# Patient Record
Sex: MALE | Race: WHITE | NOT HISPANIC OR LATINO | Employment: OTHER | URBAN - METROPOLITAN AREA
[De-identification: names, ages, dates, MRNs, and addresses within clinical notes are randomized per-mention and may not be internally consistent; named-entity substitution may affect disease eponyms.]

---

## 2017-01-09 DIAGNOSIS — F51.01 PRIMARY INSOMNIA: Primary | ICD-10-CM

## 2017-01-09 NOTE — TELEPHONE ENCOUNTER
zolpidem (AMBIEN) 10 MG tablet      Last Written Prescription Date:  12/7/16  Last Fill Quantity: 30,   # refills: 0  Last Office Visit with [facility], [facility] or [facility] prescribing provider: 8/2/16  Future Office visit:       Routing refill request to provider for review/approval because:  Drug not on the [facility], [facility] or [facility] refill protocol or controlled substance

## 2017-01-10 RX ORDER — ZOLPIDEM TARTRATE 10 MG/1
10 TABLET ORAL
Qty: 30 TABLET | Refills: 0 | Status: SHIPPED | OUTPATIENT
Start: 2017-01-10 | End: 2017-02-06

## 2017-01-16 ENCOUNTER — TELEPHONE (OUTPATIENT)
Dept: PEDIATRICS | Facility: CLINIC | Age: 72
End: 2017-01-16

## 2017-01-16 DIAGNOSIS — M54.50 CHRONIC BILATERAL LOW BACK PAIN WITHOUT SCIATICA: Primary | ICD-10-CM

## 2017-01-16 DIAGNOSIS — G89.29 CHRONIC BILATERAL LOW BACK PAIN WITHOUT SCIATICA: Primary | ICD-10-CM

## 2017-01-16 NOTE — TELEPHONE ENCOUNTER
Routing refill request to provider for review/approval because:  Drug not on the [facility] refill protocol   Cathy Rogel RN [facility] Triage.

## 2017-01-16 NOTE — TELEPHONE ENCOUNTER
Reason for Call:  Medication or medication refill:    Do you use a [facility]?  Name of the pharmacy and phone number for the current request:  [facility]     Name of the medication requested: hydrocordone    Other request: Will  at the .  Please call when the script is ready.    Can we leave a detailed message on this number? YES    Phone number patient can be reached at: Cell number on file:    Telephone Information:   Mobile [phone]       Best Time: anytime      Call taken on 1/16/2017 at 4:42 PM by Yusra Call

## 2017-01-17 RX ORDER — HYDROCODONE BITARTRATE AND ACETAMINOPHEN 5; 325 MG/1; MG/1
1 TABLET ORAL EVERY 6 HOURS PRN
Qty: 30 TABLET | Refills: 0 | Status: SHIPPED | OUTPATIENT
Start: 2017-01-17 | End: 2017-05-01

## 2017-01-17 NOTE — TELEPHONE ENCOUNTER
Prescription of HYDROcodone-acetaminophen (NORCO) 5-325 MG was brought to the .  Left detailed message, informing patient.

## 2017-01-17 NOTE — TELEPHONE ENCOUNTER
Patient picked up his prescription at the  on 1/17/17.    Yusra DUMONT  Patient Representative  [place]

## 2017-01-17 NOTE — TELEPHONE ENCOUNTER
Patient returning a missed call.  I advised patient a message was left for him that his Rx was brought to the  ready for .  DANIELLE Only.    Winnie PATEL  [place] Scheduling

## 2017-02-06 ENCOUNTER — TELEPHONE (OUTPATIENT)
Dept: PEDIATRICS | Facility: CLINIC | Age: 72
End: 2017-02-06

## 2017-02-06 ENCOUNTER — ANTICOAGULATION THERAPY VISIT (OUTPATIENT)
Dept: NURSING | Facility: CLINIC | Age: 72
End: 2017-02-06
Payer: COMMERCIAL

## 2017-02-06 DIAGNOSIS — D68.9 COAGULATION DEFECT (H): ICD-10-CM

## 2017-02-06 DIAGNOSIS — Z79.01 LONG-TERM (CURRENT) USE OF ANTICOAGULANTS: Primary | ICD-10-CM

## 2017-02-06 DIAGNOSIS — F51.01 PRIMARY INSOMNIA: Primary | ICD-10-CM

## 2017-02-06 LAB — INR POINT OF CARE: 2.4 (ref 0.86–1.14)

## 2017-02-06 PROCEDURE — 36416 COLLJ CAPILLARY BLOOD SPEC: CPT

## 2017-02-06 PROCEDURE — 85610 PROTHROMBIN TIME: CPT | Mod: QW

## 2017-02-06 PROCEDURE — 99207 ZZC NO CHARGE NURSE ONLY: CPT

## 2017-02-06 RX ORDER — ZOLPIDEM TARTRATE 10 MG/1
10 TABLET ORAL
Qty: 30 TABLET | Refills: 0 | Status: SHIPPED | OUTPATIENT
Start: 2017-02-06 | End: 2017-03-02

## 2017-02-06 NOTE — TELEPHONE ENCOUNTER
Zolpidem 10 mg      Last Written Prescription Date: 1/10/17  Last Fill Quantity: 30,  # refills: 0   Last Office Visit with [facility], [facility] or [facility] prescribing provider: 8/2/16  Routing refill request to provider for review/approval because:  Drug not on the [facility] refill protocol   Ana Lilia Yu RN  [facility]

## 2017-02-06 NOTE — TELEPHONE ENCOUNTER
Reason for Call:  Medication or medication refill: REFILL     Do you use a [facility]? NO  Name of the pharmacy and phone number for the current request:  [facility] location, [address] [place] [address]. [phone]  Name of the medication requested: zolpidem (AMBIEN) 10 MG tablet       Other request: Patient is leaving out of town on 02/09/2017, patient needs his refill by 02/08/2017.  Can you please call it into the [facility] pharmacy, and call patient when it is called in.    Can we leave a detailed message on this number? YES    Phone number patient can be reached at: Cell number on file:    Telephone Information:   Mobile [phone]       Best Time: anytime    Call taken on 2/6/2017 at 8:04 AM by Kristen Mark

## 2017-02-06 NOTE — MR AVS SNAPSHOT
Oh Solares   2/6/2017 11:40 AM   Anticoagulation Therapy Visit    Description:  71 year old male   Provider:  CP ANTI COAG   Department:  Cp Nurse           INR as of 2/6/2017     Selected INR 2.4 (2/6/2017)      Anticoagulation Summary as of 2/6/2017     INR goal 2.0-3.0   Selected INR 2.4 (2/6/2017)   Full instructions 2.5 mg on Mon; 5 mg all other days   Next INR check 3/21/2017    Indications   Long-term (current) use of anticoagulants [Z79.01] [Z79.01]  Coagulation defect (HCC) [D68.9] [D68.9]         Your next Anticoagulation Clinic appointment(s)     Feb 06, 2017 11:40 AM   Anticoagulation Visit with CP ANTI COAG   [facility]    [address] [facility] [id number] [phone]            Mar 21, 2017  8:20 AM   Anticoagulation Visit with CP ANTI COAG   [facility]    [address] [facility] [id number] [phone]              Contact Numbers     [facility]  Please call [phone] or [phone]  to cancel and/or reschedule your appointment.   Please call [phone] with any problems or questions regarding your therapy          February 2017 Details    Sun Mon Tue Wed Thu Fri Sat        1               2               3               4                 5               6      2.5 mg   See details      7      5 mg         8      5 mg         9      5 mg         10      5 mg         11      5 mg           12      5 mg         13      2.5 mg         14      5 mg         15      5 mg         16      5 mg         17      5 mg         18      5 mg           19      5 mg         20      2.5 mg         21      5 mg         22      5 mg         23      5 mg         24      5 mg         25      5 mg           26      5 mg         27      2.5 mg         28      5 mg              Date Details   02/06 This INR check                How to take your warfarin dose     To take:  2.5 mg Take 0.5 of a 5 mg tablet.    To take:  5 mg Take 1 of the 5 mg tablets.           March 2017 Details    Sun Mon Tue Wed Thu Fri Sat        1      5 mg         2      5 mg         3      5 mg         4      5 mg           5      5 mg         6      2.5 mg         7      5 mg         8      5 mg         9      5 mg         10      5 mg         11      5 mg           12      5 mg         13      2.5 mg         14      5 mg         15      5 mg         16      5 mg         17      5 mg         18      5 mg           19      5 mg         20      2.5 mg         21            22               23               24               25                 26               27               28               29               30               31                 Date Details   No additional details    Date of next INR:  3/21/2017         How to take your warfarin dose     To take:  2.5 mg Take 0.5 of a 5 mg tablet.    To take:  5 mg Take 1 of the 5 mg tablets.

## 2017-02-06 NOTE — PROGRESS NOTES
ANTICOAGULATION FOLLOW-UP CLINIC VISIT    Patient Name:  Oh Solares  Date:  2/6/2017  Contact Type:  Face to Face    SUBJECTIVE: patient here to get INR checked before going to [place] for a month.        OBJECTIVE    INR PROTIME   Date Value Ref Range Status   02/06/2017 2.4* 0.86 - 1.14 Final       ASSESSMENT / PLAN  INR assessment THER    Recheck INR In: 6 WEEKS    INR Location Clinic      Anticoagulation Summary as of 2/6/2017     INR goal 2.0-3.0   Selected INR 2.4 (2/6/2017)   Maintenance plan 2.5 mg (5 mg x 0.5) on Mon; 5 mg (5 mg x 1) all other days   Full instructions 2.5 mg on Mon; 5 mg all other days   Weekly total 32.5 mg   No change documented Zulema Genao RN   Plan last modified Zulema Genao RN (10/3/2016)   Next INR check 3/21/2017   Target end date     Indications   Long-term (current) use of anticoagulants [Z79.01] [Z79.01]  Coagulation defect (HCC) [D68.9] [D68.9]         Anticoagulation Episode Summary     INR check location     Preferred lab     Send INR reminders to [facility]    Comments       Anticoagulation Care Providers     Provider Role Specialty Phone number    Brendon Kay MD  Internal Medicine [phone]            See the Encounter Report to view Anticoagulation Flowsheet and Dosing Calendar (Go to Encounters tab in chart review, and find the Anticoagulation Therapy Visit)    Dosage adjustment made based on physician directed care plan.    Zulema Genao RN

## 2017-02-06 NOTE — TELEPHONE ENCOUNTER
Prescription of zolpidem (AMBIEN) 10 MG tablet was faxed to pharmacy.  Spoke with patient and informed.

## 2017-03-02 ENCOUNTER — TELEPHONE (OUTPATIENT)
Dept: PEDIATRICS | Facility: CLINIC | Age: 72
End: 2017-03-02

## 2017-03-02 DIAGNOSIS — F51.01 PRIMARY INSOMNIA: ICD-10-CM

## 2017-03-02 RX ORDER — ZOLPIDEM TARTRATE 10 MG/1
10 TABLET ORAL
Qty: 30 TABLET | Refills: 0 | Status: CANCELLED | OUTPATIENT
Start: 2017-03-02

## 2017-03-02 RX ORDER — ZOLPIDEM TARTRATE 10 MG/1
10 TABLET ORAL
Qty: 30 TABLET | Refills: 0 | Status: SHIPPED | OUTPATIENT
Start: 2017-03-02 | End: 2017-04-03

## 2017-03-02 NOTE — TELEPHONE ENCOUNTER
Reason for Call:  Medication or medication refill:    Do you use a [facility]?  Name of the pharmacy and phone number for the current request:  [facility] [address] [place]  [address] [phone]    Name of the medication requested: zolpidem (AMBIEN) 10 MG tablet    Other request: Patient is in [place] please to [facility] in [place]    Can we leave a detailed message on this number? YES    Phone number patient can be reached at: Cell number on file:    Telephone Information:   Mobile [phone]       Best Time: Anytime    Call taken on 3/2/2017 at 1:57 PM by Edilma Diego

## 2017-03-02 NOTE — TELEPHONE ENCOUNTER
zolpidem (AMBIEN) 10 MG tablet      Last Written Prescription Date:  2/6/17  Last Fill Quantity: 30,   # refills: 0  Last Office Visit with [facility], [facility] or [facility] prescribing provider: 8/2/16  Future Office visit:       Routing refill request to provider for review/approval because:  Drug not on the [facility], [facility] or [facility] refill protocol or controlled substance

## 2017-04-03 ENCOUNTER — ANTICOAGULATION THERAPY VISIT (OUTPATIENT)
Dept: NURSING | Facility: CLINIC | Age: 72
End: 2017-04-03
Payer: COMMERCIAL

## 2017-04-03 DIAGNOSIS — Z79.01 LONG-TERM (CURRENT) USE OF ANTICOAGULANTS: ICD-10-CM

## 2017-04-03 DIAGNOSIS — F51.01 PRIMARY INSOMNIA: ICD-10-CM

## 2017-04-03 DIAGNOSIS — D68.9 COAGULATION DEFECT (H): ICD-10-CM

## 2017-04-03 LAB — INR POINT OF CARE: 2.6 (ref 0.86–1.14)

## 2017-04-03 PROCEDURE — 85610 PROTHROMBIN TIME: CPT | Mod: QW

## 2017-04-03 PROCEDURE — 36416 COLLJ CAPILLARY BLOOD SPEC: CPT

## 2017-04-03 PROCEDURE — 99207 ZZC NO CHARGE NURSE ONLY: CPT

## 2017-04-03 NOTE — TELEPHONE ENCOUNTER
Reason for Call:  Medication or medication refill:    Do you use a [facility]?  Name of the pharmacy and phone number for the current request:  [facility], [address], [place] [phone]    Name of the medication requested: zolpidem (AMBIEN) 10 MG tablet    Other request: Patient is out of medication    Can we leave a detailed message on this number? YES    Phone number patient can be reached at: [phone]    Best Time: anytime    Call taken on 4/3/2017 at 1:15 PM by Marcus Carpenter

## 2017-04-03 NOTE — MR AVS SNAPSHOT
Oh Solares   4/3/2017 3:40 PM   Anticoagulation Therapy Visit    Description:  72 year old male   Provider:  NAYAN ANTI COAG   Department:  Cp Nurse           INR as of 4/3/2017     Today's INR 2.6      Anticoagulation Summary as of 4/3/2017     INR goal 2.0-3.0   Today's INR 2.6   Full instructions 2.5 mg on Mon; 5 mg all other days   Next INR check 5/15/2017    Indications   Long-term (current) use of anticoagulants [Z79.01] [Z79.01]  Coagulation defect (HCC) [D68.9] [D68.9]         Your next Anticoagulation Clinic appointment(s)     May 15, 2017 11:20 AM CDT   Anticoagulation Visit with NAYAN ANTI COAG   [facility]    [address] [place] [id number] [phone]              Contact Numbers     [facility]  Please call [phone] or [phone]  to cancel and/or reschedule your appointment.   Please call [phone] with any problems or questions regarding your therapy          April 2017 Details    Sun Mon Tue Wed Thu Fri Sat           1                 2               3      2.5 mg   See details      4      5 mg         5      5 mg         6      5 mg         7      5 mg         8      5 mg           9      5 mg         10      2.5 mg         11      5 mg         12      5 mg         13      5 mg         14      5 mg         15      5 mg           16      5 mg         17      2.5 mg         18      5 mg         19      5 mg         20      5 mg         21      5 mg         22      5 mg           23      5 mg         24      2.5 mg         25      5 mg         26      5 mg         27      5 mg         28      5 mg         29      5 mg           30      5 mg                Date Details   04/03 This INR check               How to take your warfarin dose     To take:  2.5 mg Take 0.5 of a 5 mg tablet.    To take:  5 mg Take 1 of the 5 mg tablets.           May 2017 Details    Sun Mon Tue Wed Thu Fri Sat       1      2.5 mg         2      5 mg         3      5 mg         4      5 mg         5      5 mg         6      5 mg           7      5 mg         8      2.5 mg         9      5 mg         10      5 mg         11      5 mg         12      5 mg         13      5 mg           14      5 mg         15            16               17               18               19               20                 21               22               23               24               25               26               27                 28               29               30               31                   Date Details   No additional details    Date of next INR:  5/15/2017         How to take your warfarin dose     To take:  2.5 mg Take 0.5 of a 5 mg tablet.    To take:  5 mg Take 1 of the 5 mg tablets.

## 2017-04-03 NOTE — PROGRESS NOTES
ANTICOAGULATION FOLLOW-UP CLINIC VISIT    Patient Name:  Oh Solares  Date:  4/3/2017  Contact Type:  Face to Face    SUBJECTIVE:     Patient Findings     Positives No Problem Findings           OBJECTIVE    INR Protime   Date Value Ref Range Status   04/03/2017 2.6 (A) 0.86 - 1.14 Final       ASSESSMENT / PLAN  INR assessment THER    Recheck INR In: 6 WEEKS    INR Location Clinic      Anticoagulation Summary as of 4/3/2017     INR goal 2.0-3.0   Today's INR 2.6   Maintenance plan 2.5 mg (5 mg x 0.5) on Mon; 5 mg (5 mg x 1) all other days   Full instructions 2.5 mg on Mon; 5 mg all other days   Weekly total 32.5 mg   No change documented Zulema Genao RN   Plan last modified Zulema Genao RN (10/3/2016)   Next INR check 5/15/2017   Target end date     Indications   Long-term (current) use of anticoagulants [Z79.01] [Z79.01]  Coagulation defect (HCC) [D68.9] [D68.9]         Anticoagulation Episode Summary     INR check location     Preferred lab     Send [facility]R reminders to  [facility]    Comments       Anticoagulation Care Providers     Provider Role Specialty Phone number    Brendon Kay MD  Internal Medicine [phone]            See the Encounter Report to view Anticoagulation Flowsheet and Dosing Calendar (Go to Encounters tab in chart review, and find the Anticoagulation Therapy Visit)    Dosage adjustment made based on physician directed care plan.    Zulema Genao RN

## 2017-04-03 NOTE — TELEPHONE ENCOUNTER
Routing refill request to provider for review/approval because:  Drug not on the [facility] refill protocol   Last refill on 3/2/17 # 30  Last OV 8/2/16.    Cathy Rogel, RN [facility] Triage.

## 2017-04-04 RX ORDER — ZOLPIDEM TARTRATE 10 MG/1
10 TABLET ORAL
Qty: 30 TABLET | Refills: 0 | Status: SHIPPED | OUTPATIENT
Start: 2017-04-04 | End: 2017-05-04

## 2017-04-04 NOTE — TELEPHONE ENCOUNTER
Prescription of zolpidem (AMBIEN) 10 MG tablet was faxed to pharmacy.  Left detailed message, informing patient.

## 2017-04-11 DIAGNOSIS — Z79.01 LONG TERM (CURRENT) USE OF ANTICOAGULANTS: ICD-10-CM

## 2017-04-11 RX ORDER — WARFARIN SODIUM 5 MG/1
TABLET ORAL
Qty: 90 TABLET | Refills: 2 | Status: SHIPPED | OUTPATIENT
Start: 2017-04-11 | End: 2017-12-12

## 2017-04-11 NOTE — TELEPHONE ENCOUNTER
Anticoagulation Monitoring INR Value INR Goal Range Therapeutic?   Latest Ref Rng & Units 0.86 - 1.14     4/3/2017  2.0-3.0      Anticoagulation Monitoring Last Week Total Next Week Total Weekly Dose   Latest Ref Rng & Units      4/3/2017 32.5 mg 32.5 mg      Anticoagulation Monitoring Return Date Recheck   Latest Ref Rng & Units     4/3/2017 5/15/2017      Anticoagulation Monitoring Instructions Instructions   Latest Ref Rng & Units     4/3/2017 2.5 mg on Mon; 5 mg all other days      Anticoagulation Monitoring Reason out of range INR location Visit Report   Latest Ref Rng & Units      4/3/2017        This patient is followed at [facility].  This patient is compliant with INR blood tests.  Chart review shows he is taking warfarin (COUMADIN) 5 MG tablets per above schedule.    Zulema Lemus RN

## 2017-05-01 ENCOUNTER — TELEPHONE (OUTPATIENT)
Dept: PEDIATRICS | Facility: CLINIC | Age: 72
End: 2017-05-01

## 2017-05-01 DIAGNOSIS — G89.29 CHRONIC BILATERAL LOW BACK PAIN WITHOUT SCIATICA: ICD-10-CM

## 2017-05-01 DIAGNOSIS — M54.50 CHRONIC BILATERAL LOW BACK PAIN WITHOUT SCIATICA: ICD-10-CM

## 2017-05-01 NOTE — TELEPHONE ENCOUNTER
Reason for Call:  Medication or medication refill:    Do you use a [facility]?  Name of the pharmacy and phone number for the current request:  [facility]    Name of the medication requested: HYDROcodone-acetaminophen (NORCO) 5-325 MG    Other request: please call when ready for     Can we leave a detailed message on this number? YES    Phone number patient can be reached at: Home number on file [phone] (home)    Best Time: any    Call taken on 5/1/2017 at 12:55 PM by Jennifer Painter    HYDROcodone-acetaminophen (NORCO) 5-325 MG      Last Written Prescription Date:  1-17-17  Last Fill Quantity: 30,   # refills: 0  Last Office Visit with [facility] prescribing provider: 8-2-16  Future Office visit:   none    Routing refill request to provider for review/approval because:  Drug not on the [facility] refill protocol or controlled substance

## 2017-05-02 RX ORDER — HYDROCODONE BITARTRATE AND ACETAMINOPHEN 5; 325 MG/1; MG/1
1 TABLET ORAL EVERY 6 HOURS PRN
Qty: 30 TABLET | Refills: 0 | Status: SHIPPED | OUTPATIENT
Start: 2017-05-02 | End: 2017-07-17

## 2017-05-02 NOTE — TELEPHONE ENCOUNTER
Prescription of HYDROcodone-acetaminophen (NORCO) 5-325 MG was brought to the  and patient informed to .

## 2017-05-04 DIAGNOSIS — F51.01 PRIMARY INSOMNIA: ICD-10-CM

## 2017-05-04 NOTE — TELEPHONE ENCOUNTER
zolpidem (AMBIEN) 10 MG tablet      Last Written Prescription Date:  4/4/17  Last Fill Quantity: 30,   # refills: 0  Last Office Visit with [facility] or [facility] prescribing provider: 8/2/16  Future Office visit:       Routing refill request to provider for review/approval because:  Drug not on the [facility], [facility] or [facility] refill protocol or controlled substance

## 2017-05-05 RX ORDER — ZOLPIDEM TARTRATE 10 MG/1
TABLET ORAL
Qty: 30 TABLET | Refills: 0 | Status: SHIPPED | OUTPATIENT
Start: 2017-05-05 | End: 2017-06-01

## 2017-05-18 ENCOUNTER — ANTICOAGULATION THERAPY VISIT (OUTPATIENT)
Dept: NURSING | Facility: CLINIC | Age: 72
End: 2017-05-18
Payer: COMMERCIAL

## 2017-05-18 DIAGNOSIS — D68.9 COAGULATION DEFECT (H): ICD-10-CM

## 2017-05-18 DIAGNOSIS — Z79.01 LONG-TERM (CURRENT) USE OF ANTICOAGULANTS: ICD-10-CM

## 2017-05-18 LAB — INR POINT OF CARE: 2.2 (ref 0.86–1.14)

## 2017-05-18 PROCEDURE — 36416 COLLJ CAPILLARY BLOOD SPEC: CPT

## 2017-05-18 PROCEDURE — 99207 ZZC NO CHARGE NURSE ONLY: CPT

## 2017-05-18 PROCEDURE — 85610 PROTHROMBIN TIME: CPT | Mod: QW

## 2017-05-18 NOTE — MR AVS SNAPSHOT
Ohbriseida Solares   5/18/2017 1:40 PM   Anticoagulation Therapy Visit    Description:  72 year old male   Provider:  NAYAN ANTI COAG   Department:  Cp Nurse           INR as of 5/18/2017     Today's INR 2.2      Anticoagulation Summary as of 5/18/2017     INR goal 2.0-3.0   Today's INR 2.2   Full instructions 2.5 mg on Mon; 5 mg all other days   Next INR check 6/29/2017    Indications   Long-term (current) use of anticoagulants [Z79.01] [Z79.01]  Coagulation defect (HCC) [D68.9] [D68.9]         Your next Anticoagulation Clinic appointment(s)     Jun 29, 2017 10:40 AM CDT   Anticoagulation Visit with NAYAN ANTI COAG   [facility]    [address] [place] [id number] [phone]              Contact Numbers     [facility]  Please call [phone] or [phone]  to cancel and/or reschedule your appointment.   Please call [phone] with any problems or questions regarding your therapy          May 2017 Details    Sun Mon Tue Wed Thu Fri Sat      1               2               3               4               5               6                 7               8               9               10               11               12               13                 14               15               16               17               18      5 mg   See details      19      5 mg         20      5 mg           21      5 mg         22      2.5 mg         23      5 mg         24      5 mg         25      5 mg         26      5 mg         27      5 mg           28      5 mg         29      2.5 mg         30      5 mg         31      5 mg             Date Details   05/18 This INR check               How to take your warfarin dose     To take:  2.5 mg Take 0.5 of a 5 mg tablet.    To take:  5 mg Take 1 of the 5 mg tablets.           June 2017 Details    Sun Mon Tue Wed Thu Fri Sat         1      5 mg         2      5 mg         3       5 mg           4      5 mg         5      2.5 mg         6      5 mg         7      5 mg         8      5 mg         9      5 mg         10      5 mg           11      5 mg         12      2.5 mg         13      5 mg         14      5 mg         15      5 mg         16      5 mg         17      5 mg           18      5 mg         19      2.5 mg         20      5 mg         21      5 mg         22      5 mg         23      5 mg         24      5 mg           25      5 mg         26      2.5 mg         27      5 mg         28      5 mg         29            30                 Date Details   No additional details    Date of next INR:  6/29/2017         How to take your warfarin dose     To take:  2.5 mg Take 0.5 of a 5 mg tablet.    To take:  5 mg Take 1 of the 5 mg tablets.

## 2017-05-18 NOTE — PROGRESS NOTES
ANTICOAGULATION FOLLOW-UP CLINIC VISIT    Patient Name:  Oh Solares  Date:  5/18/2017  Contact Type:  Face to Face    SUBJECTIVE:     Patient Findings     Positives No Problem Findings           OBJECTIVE    INR Protime   Date Value Ref Range Status   05/18/2017 2.2 (A) 0.86 - 1.14 Final       ASSESSMENT / PLAN  INR assessment THER    Recheck INR In: 6 WEEKS    INR Location Clinic      Anticoagulation Summary as of 5/18/2017     INR goal 2.0-3.0   Today's INR 2.2   Maintenance plan 2.5 mg (5 mg x 0.5) on Mon; 5 mg (5 mg x 1) all other days   Full instructions 2.5 mg on Mon; 5 mg all other days   Weekly total 32.5 mg   No change documented Zulema Genao RN   Plan last modified Zulema Genao RN (10/3/2016)   Next INR check 6/29/2017   Target end date     Indications   Long-term (current) use of anticoagulants [Z79.01] [Z79.01]  Coagulation defect (HCC) [D68.9] [D68.9]         Anticoagulation Episode Summary     INR check location     Preferred lab     Send INR reminders to [facility]    Comments       Anticoagulation Care Providers     Provider Role Specialty Phone number    Brendon Kay MD  Internal Medicine [phone]            See the Encounter Report to view Anticoagulation Flowsheet and Dosing Calendar (Go to Encounters tab in chart review, and find the Anticoagulation Therapy Visit)    Dosage adjustment made based on physician directed care plan.    Zulema Genao RN

## 2017-06-01 ENCOUNTER — TELEPHONE (OUTPATIENT)
Dept: FAMILY MEDICINE | Facility: CLINIC | Age: 72
End: 2017-06-01

## 2017-06-01 DIAGNOSIS — F51.01 PRIMARY INSOMNIA: ICD-10-CM

## 2017-06-02 NOTE — TELEPHONE ENCOUNTER
zolpidem (AMBIEN) 10 MG tablet      Last Written Prescription Date:  5/5/17  Last Fill Quantity: 30,   # refills: 0  Last Office Visit with [facility] or [facility] prescribing provider: 8/2/16  Future Office visit:       Routing refill request to provider for review/approval because:  Drug not on the [facility], [facility] or [facility] refill protocol or controlled substance

## 2017-06-06 NOTE — TELEPHONE ENCOUNTER
Patient is calling to check status of refill.  Please call him.    Oh Solares (Self) [phone] (M)           Winnie PATEL  [place] Scheduling

## 2017-06-08 RX ORDER — ZOLPIDEM TARTRATE 10 MG/1
TABLET ORAL
Qty: 30 TABLET | Refills: 0 | Status: SHIPPED | OUTPATIENT
Start: 2017-06-08 | End: 2017-07-05

## 2017-06-29 ENCOUNTER — ANTICOAGULATION THERAPY VISIT (OUTPATIENT)
Dept: NURSING | Facility: CLINIC | Age: 72
End: 2017-06-29
Payer: COMMERCIAL

## 2017-06-29 DIAGNOSIS — D68.9 COAGULATION DEFECT (H): ICD-10-CM

## 2017-06-29 DIAGNOSIS — Z79.01 LONG-TERM (CURRENT) USE OF ANTICOAGULANTS: ICD-10-CM

## 2017-06-29 LAB — INR POINT OF CARE: 2.6 (ref 0.86–1.14)

## 2017-06-29 PROCEDURE — 99207 ZZC NO CHARGE NURSE ONLY: CPT

## 2017-06-29 PROCEDURE — 85610 PROTHROMBIN TIME: CPT | Mod: QW

## 2017-06-29 PROCEDURE — 36416 COLLJ CAPILLARY BLOOD SPEC: CPT

## 2017-06-29 NOTE — PROGRESS NOTES
ANTICOAGULATION FOLLOW-UP CLINIC VISIT    Patient Name:  Oh Solares  Date:  6/29/2017  Contact Type:  Face to Face    SUBJECTIVE:     Patient Findings     Positives No Problem Findings           OBJECTIVE    INR Protime   Date Value Ref Range Status   06/29/2017 2.6 (A) 0.86 - 1.14 Final       ASSESSMENT / PLAN  INR assessment THER    Recheck INR In: 6 WEEKS    INR Location Clinic      Anticoagulation Summary as of 6/29/2017     INR goal 2.0-3.0   Today's INR 2.6   Maintenance plan 2.5 mg (5 mg x 0.5) on Mon; 5 mg (5 mg x 1) all other days   Full instructions 2.5 mg on Mon; 5 mg all other days   Weekly total 32.5 mg   No change documented Zulema Genao RN   Plan last modified Zulema Genao RN (10/3/2016)   Next INR check 8/10/2017   Target end date     Indications   Long-term (current) use of anticoagulants [Z79.01] [Z79.01]  Coagulation defect (HCC) [D68.9] [D68.9]         Anticoagulation Episode Summary     INR check location     Preferred lab     Send INR reminders to  [facility]    Comments       Anticoagulation Care Providers     Provider Role Specialty Phone number    Brendon Kay MD  Internal Medicine [phone]            See the Encounter Report to view Anticoagulation Flowsheet and Dosing Calendar (Go to Encounters tab in chart review, and find the Anticoagulation Therapy Visit)    Dosage adjustment made based on physician directed care plan.    Zulema Genao RN

## 2017-06-29 NOTE — MR AVS SNAPSHOT
Oh Solares   6/29/2017 10:40 AM   Anticoagulation Therapy Visit    Description:  72 year old male   Provider:  CP ANTI COAG   Department:  Cp Nurse           INR as of 6/29/2017     Today's INR 2.6      Anticoagulation Summary as of 6/29/2017     INR goal 2.0-3.0   Today's INR 2.6   Full instructions 2.5 mg on Mon; 5 mg all other days   Next INR check 8/10/2017    Indications   Long-term (current) use of anticoagulants [Z79.01] [Z79.01]  Coagulation defect (HCC) [D68.9] [D68.9]         Your next Anticoagulation Clinic appointment(s)     Jun 29, 2017 10:40 AM CDT   [facility]    [address] [facility] [id number] [phone]            Aug 10, 2017 10:40 AM CDT   Anticoagulation Visit with CP ANTI COAG   [facility]    [address] [place] [id number] [phone]              Contact Numbers     [facility]  Please call [phone] or [phone]  to cancel and/or reschedule your appointment.   Please call [phone] with any problems or questions regarding your therapy          June 2017 Details    Sun Mon Tue Wed Thu Fri Sat         1               2               3                 4               5               6               7               8               9               10                 11               12               13               14               15               16               17                 18               19               20               21               22               23               24                 25               26               27               28               29      5 mg   See details      30      5 mg           Date Details   06/29 This INR check               How to take your warfarin dose     To take:  5 mg Take 1 of the 5 mg  tablets.           July 2017 Details    Sun Mon Tue Wed Thu Fri Sat           1      5 mg           2      5 mg         3      2.5 mg         4      5 mg         5      5 mg         6      5 mg         7      5 mg         8      5 mg           9      5 mg         10      2.5 mg         11      5 mg         12      5 mg         13      5 mg         14      5 mg         15      5 mg           16      5 mg         17      2.5 mg         18      5 mg         19      5 mg         20      5 mg         21      5 mg         22      5 mg           23      5 mg         24      2.5 mg         25      5 mg         26      5 mg         27      5 mg         28      5 mg         29      5 mg           30      5 mg         31      2.5 mg               Date Details   No additional details            How to take your warfarin dose     To take:  2.5 mg Take 0.5 of a 5 mg tablet.    To take:  5 mg Take 1 of the 5 mg tablets.           August 2017 Details    Sun Mon Tue Wed Thu Fri Sat       1      5 mg         2      5 mg         3      5 mg         4      5 mg         5      5 mg           6      5 mg         7      2.5 mg         8      5 mg         9      5 mg         10            11               12                 13               14               15               16               17               18               19                 20               21               22               23               24               25               26                 27               28               29               30               31                  Date Details   No additional details    Date of next INR:  8/10/2017         How to take your warfarin dose     To take:  2.5 mg Take 0.5 of a 5 mg tablet.    To take:  5 mg Take 1 of the 5 mg tablets.

## 2017-07-05 DIAGNOSIS — F51.01 PRIMARY INSOMNIA: ICD-10-CM

## 2017-07-05 NOTE — TELEPHONE ENCOUNTER
zolpidem (AMBIEN) 10 MG tablet      Last Written Prescription Date:  6-8-17  Last Fill Quantity: 30,   # refills: 0  Last Office Visit with [facility], [facility] or [facility] prescribing provider: 8-2-16  Future Office visit:       Routing refill request to provider for review/approval because:  Drug not on the [facility], [facility] or [facility] refill protocol or controlled substance

## 2017-07-06 RX ORDER — ZOLPIDEM TARTRATE 10 MG/1
TABLET ORAL
Qty: 30 TABLET | Refills: 0 | Status: SHIPPED | OUTPATIENT
Start: 2017-07-06 | End: 2017-08-06

## 2017-07-17 DIAGNOSIS — M54.50 CHRONIC BILATERAL LOW BACK PAIN WITHOUT SCIATICA: ICD-10-CM

## 2017-07-17 DIAGNOSIS — G89.29 CHRONIC BILATERAL LOW BACK PAIN WITHOUT SCIATICA: ICD-10-CM

## 2017-07-19 RX ORDER — HYDROCODONE BITARTRATE AND ACETAMINOPHEN 5; 325 MG/1; MG/1
1 TABLET ORAL EVERY 6 HOURS PRN
Qty: 30 TABLET | Refills: 0 | Status: SHIPPED | OUTPATIENT
Start: 2017-07-19 | End: 2017-09-26

## 2017-07-26 ENCOUNTER — TELEPHONE (OUTPATIENT)
Dept: PEDIATRICS | Facility: CLINIC | Age: 72
End: 2017-07-26

## 2017-07-26 DIAGNOSIS — Z13.6 CARDIOVASCULAR SCREENING; LDL GOAL LESS THAN 160: Primary | ICD-10-CM

## 2017-07-26 DIAGNOSIS — K92.0 GASTROINTESTINAL HEMORRHAGE WITH HEMATEMESIS: ICD-10-CM

## 2017-07-26 DIAGNOSIS — Z79.01 LONG-TERM (CURRENT) USE OF ANTICOAGULANTS: ICD-10-CM

## 2017-07-26 NOTE — TELEPHONE ENCOUNTER
Reason for Call: Request for an order or referral:    Order or referral being requested: Lab orders for 8/16/17 appointment.  Patient has scheduled physical for 8/23/17.    Date needed: before my next appointment    Has the patient been seen by the PCP for this problem? YES    Additional comments:     Phone number Patient can be reached at:  Home number on file [phone] (home)    Best Time:  Any    Can we leave a detailed message on this number?  YES    Call taken on 7/26/2017 at 12:55 PM by Clifton Ugarte

## 2017-08-06 DIAGNOSIS — F51.01 PRIMARY INSOMNIA: ICD-10-CM

## 2017-08-07 RX ORDER — ZOLPIDEM TARTRATE 10 MG/1
TABLET ORAL
Qty: 30 TABLET | Refills: 0 | Status: SHIPPED | OUTPATIENT
Start: 2017-08-07 | End: 2017-08-23

## 2017-08-07 NOTE — TELEPHONE ENCOUNTER
zolpidem (AMBIEN) 10 MG tablet      Last Written Prescription Date:  7-6-17  Last Fill Quantity: 30,   # refills: 0  Last Office Visit with [facility] prescribing provider: 8-2-16  Future Office visit:    Next 5 appointments (look out 90 days)     Aug 23, 2017  2:00 PM CDT   PHYSICAL with Brendon Kay MD   [facility] ([facility])    [address] [place] [id number] [phone]                   Routing refill request to provider for review/approval because:  Drug not on the [facility] refill protocol or controlled substance

## 2017-08-11 ENCOUNTER — TELEPHONE (OUTPATIENT)
Dept: NURSING | Facility: CLINIC | Age: 72
End: 2017-08-11

## 2017-08-11 NOTE — LETTER
[address] [id number] [phone]          August 11, 2017    Oh Solares                                                                                                                     [address] [place] [address]  [facility] [id number]            Dear Oh,    This letter is being sent to you because you missed your INR blood test appointment yesterday 8-10-17.    Please contact either the INR clinic directly to reschedule at [phone] or the main clinic scheduling department at [phone] at your earliest convenience.  Thank you.      Sincerely,     [facility]    Brendon Kay MD

## 2017-08-14 ENCOUNTER — ANTICOAGULATION THERAPY VISIT (OUTPATIENT)
Dept: NURSING | Facility: CLINIC | Age: 72
End: 2017-08-14
Payer: COMMERCIAL

## 2017-08-14 DIAGNOSIS — D68.9 COAGULATION DEFECT (H): ICD-10-CM

## 2017-08-14 DIAGNOSIS — Z79.01 LONG-TERM (CURRENT) USE OF ANTICOAGULANTS: ICD-10-CM

## 2017-08-14 LAB — INR POINT OF CARE: 2.6 (ref 0.86–1.14)

## 2017-08-14 PROCEDURE — 85610 PROTHROMBIN TIME: CPT | Mod: QW

## 2017-08-14 PROCEDURE — 99207 ZZC NO CHARGE NURSE ONLY: CPT

## 2017-08-14 PROCEDURE — 36416 COLLJ CAPILLARY BLOOD SPEC: CPT

## 2017-08-14 NOTE — PROGRESS NOTES
ANTICOAGULATION FOLLOW-UP CLINIC VISIT    Patient Name:  Oh Solares  Date:  8/14/2017  Contact Type:  Face to Face    SUBJECTIVE: Reports no change in medicines or bleeding or clotting symptoms.  States he has had a respiratory illness for about a month now, but is not taking abx or steroids.  His diet is consistent and he is following the warfarin schedule as ordered.  No concerns today.     Patient Findings     Positives No Problem Findings           OBJECTIVE    INR Protime   Date Value Ref Range Status   08/14/2017 2.6 (A) 0.86 - 1.14 Final       ASSESSMENT / PLAN  INR assessment THER    Recheck INR In: 6 WEEKS    INR Location Clinic      Anticoagulation Summary as of 8/14/2017     INR goal 2.0-3.0   Today's INR 2.6   Maintenance plan 2.5 mg (5 mg x 0.5) on Mon; 5 mg (5 mg x 1) all other days   Full instructions 2.5 mg on Mon; 5 mg all other days   Weekly total 32.5 mg   No change documented Zulema Genao RN   Plan last modified Zulema Genao RN (10/3/2016)   Next INR check 9/25/2017   Target end date Indefinite    Indications   Long-term (current) use of anticoagulants [Z79.01] [Z79.01]  Coagulation defect (HCC) [D68.9] [D68.9]         Anticoagulation Episode Summary     INR check location     Preferred lab     Send INR reminders to [facility]    Comments       Anticoagulation Care Providers     Provider Role Specialty Phone number    Brendon Kay MD  Internal Medicine [phone]            See the Encounter Report to view Anticoagulation Flowsheet and Dosing Calendar (Go to Encounters tab in chart review, and find the Anticoagulation Therapy Visit)    Dosage adjustment made based on physician directed care plan.    Zulema Genao RN

## 2017-08-14 NOTE — MR AVS SNAPSHOT
Oh Solares   8/14/2017 1:20 PM   Anticoagulation Therapy Visit    Description:  72 year old male   Provider:  CP ANTI COAG   Department:  Cp Nurse           INR as of 8/14/2017     Today's INR 2.6      Anticoagulation Summary as of 8/14/2017     INR goal 2.0-3.0   Today's INR 2.6   Full instructions 2.5 mg on Mon; 5 mg all other days   Next INR check 9/25/2017    Indications   Long-term (current) use of anticoagulants [Z79.01] [Z79.01]  Coagulation defect (HCC) [D68.9] [D68.9]         Your next Anticoagulation Clinic appointment(s)     Aug 14, 2017  1:20 PM CDT   Anticoagulation Visit with CP ANTI COAG   [facility]    [address] [facility] [id number] [phone]            Sep 25, 2017 11:20 AM CDT   Anticoagulation Visit with CP ANTI COAG   [facility] ([facility])    [address] [facility] [id number] [phone]              Contact Numbers     [facility]  Please call [phone] or [phone]  to cancel and/or reschedule your appointment.   Please call [phone] with any problems or questions regarding your therapy          August 2017 Details    Sun Mon Tue Wed Thu Fri Sat       1               2               3               4               5                 6               7               8               9               10               11               12                 13               14      2.5 mg   See details      15      5 mg         16      5 mg         17      5 mg         18      5 mg         19      5 mg           20      5 mg         21      2.5 mg         22      5 mg         23      5 mg         24      5 mg         25      5 mg         26      5 mg           27      5 mg         28      2.5 mg         29      5 mg         30      5 mg         31      5 mg            Date Details   08/14 This INR  check               How to take your warfarin dose     To take:  2.5 mg Take 0.5 of a 5 mg tablet.    To take:  5 mg Take 1 of the 5 mg tablets.           September 2017 Details    Sun Mon Tue Wed Thu Fri Sat          1      5 mg         2      5 mg           3      5 mg         4      2.5 mg         5      5 mg         6      5 mg         7      5 mg         8      5 mg         9      5 mg           10      5 mg         11      2.5 mg         12      5 mg         13      5 mg         14      5 mg         15      5 mg         16      5 mg           17      5 mg         18      2.5 mg         19      5 mg         20      5 mg         21      5 mg         22      5 mg         23      5 mg           24      5 mg         25            26               27               28               29               30                Date Details   No additional details    Date of next INR:  9/25/2017         How to take your warfarin dose     To take:  2.5 mg Take 0.5 of a 5 mg tablet.    To take:  5 mg Take 1 of the 5 mg tablets.

## 2017-08-16 DIAGNOSIS — K92.0 GASTROINTESTINAL HEMORRHAGE WITH HEMATEMESIS: ICD-10-CM

## 2017-08-16 DIAGNOSIS — Z79.01 LONG-TERM (CURRENT) USE OF ANTICOAGULANTS: ICD-10-CM

## 2017-08-16 DIAGNOSIS — Z13.6 CARDIOVASCULAR SCREENING; LDL GOAL LESS THAN 160: ICD-10-CM

## 2017-08-16 LAB
ANION GAP SERPL CALCULATED.3IONS-SCNC: 8 MMOL/L (ref 3–14)
BASOPHILS # BLD AUTO: 0 10E9/L (ref 0–0.2)
BASOPHILS NFR BLD AUTO: 0.5 %
BUN SERPL-MCNC: 17 MG/DL (ref 7–30)
CALCIUM SERPL-MCNC: 8.9 MG/DL (ref 8.5–10.1)
CHLORIDE SERPL-SCNC: 105 MMOL/L (ref 94–109)
CHOLEST SERPL-MCNC: 203 MG/DL
CO2 SERPL-SCNC: 25 MMOL/L (ref 20–32)
CREAT SERPL-MCNC: 1.03 MG/DL (ref 0.66–1.25)
DIFFERENTIAL METHOD BLD: NORMAL
EOSINOPHIL # BLD AUTO: 0.5 10E9/L (ref 0–0.7)
EOSINOPHIL NFR BLD AUTO: 8 %
ERYTHROCYTE [DISTWIDTH] IN BLOOD BY AUTOMATED COUNT: 14.1 % (ref 10–15)
GFR SERPL CREATININE-BSD FRML MDRD: 71 ML/MIN/1.7M2
GLUCOSE SERPL-MCNC: 95 MG/DL (ref 70–99)
HCT VFR BLD AUTO: 44.9 % (ref 40–53)
HDLC SERPL-MCNC: 82 MG/DL
HGB BLD-MCNC: 14.7 G/DL (ref 13.3–17.7)
LDLC SERPL CALC-MCNC: 103 MG/DL
LYMPHOCYTES # BLD AUTO: 1.3 10E9/L (ref 0.8–5.3)
LYMPHOCYTES NFR BLD AUTO: 21.9 %
MCH RBC QN AUTO: 29.7 PG (ref 26.5–33)
MCHC RBC AUTO-ENTMCNC: 32.7 G/DL (ref 31.5–36.5)
MCV RBC AUTO: 91 FL (ref 78–100)
MONOCYTES # BLD AUTO: 0.7 10E9/L (ref 0–1.3)
MONOCYTES NFR BLD AUTO: 12.2 %
NEUTROPHILS # BLD AUTO: 3.4 10E9/L (ref 1.6–8.3)
NEUTROPHILS NFR BLD AUTO: 57.4 %
NONHDLC SERPL-MCNC: 121 MG/DL
PLATELET # BLD AUTO: 235 10E9/L (ref 150–450)
POTASSIUM SERPL-SCNC: 4.4 MMOL/L (ref 3.4–5.3)
RBC # BLD AUTO: 4.95 10E12/L (ref 4.4–5.9)
SODIUM SERPL-SCNC: 138 MMOL/L (ref 133–144)
TRIGL SERPL-MCNC: 89 MG/DL
WBC # BLD AUTO: 6 10E9/L (ref 4–11)

## 2017-08-16 PROCEDURE — 85025 COMPLETE CBC W/AUTO DIFF WBC: CPT | Performed by: INTERNAL MEDICINE

## 2017-08-16 PROCEDURE — 80048 BASIC METABOLIC PNL TOTAL CA: CPT | Performed by: INTERNAL MEDICINE

## 2017-08-16 PROCEDURE — 36415 COLL VENOUS BLD VENIPUNCTURE: CPT | Performed by: INTERNAL MEDICINE

## 2017-08-16 PROCEDURE — 80061 LIPID PANEL: CPT | Performed by: INTERNAL MEDICINE

## 2017-08-23 ENCOUNTER — OFFICE VISIT (OUTPATIENT)
Dept: FAMILY MEDICINE | Facility: CLINIC | Age: 72
End: 2017-08-23
Payer: COMMERCIAL

## 2017-08-23 VITALS
WEIGHT: 142 LBS | DIASTOLIC BLOOD PRESSURE: 71 MMHG | SYSTOLIC BLOOD PRESSURE: 133 MMHG | HEART RATE: 59 BPM | HEIGHT: 65 IN | TEMPERATURE: 98.6 F | BODY MASS INDEX: 23.66 KG/M2 | OXYGEN SATURATION: 96 %

## 2017-08-23 DIAGNOSIS — F51.01 PRIMARY INSOMNIA: ICD-10-CM

## 2017-08-23 DIAGNOSIS — G89.4 CHRONIC PAIN SYNDROME: Primary | ICD-10-CM

## 2017-08-23 DIAGNOSIS — D68.51 FACTOR V LEIDEN MUTATION (H): ICD-10-CM

## 2017-08-23 PROCEDURE — 99000 SPECIMEN HANDLING OFFICE-LAB: CPT | Performed by: INTERNAL MEDICINE

## 2017-08-23 PROCEDURE — 80307 DRUG TEST PRSMV CHEM ANLYZR: CPT | Mod: 90 | Performed by: INTERNAL MEDICINE

## 2017-08-23 PROCEDURE — 99397 PER PM REEVAL EST PAT 65+ YR: CPT | Performed by: INTERNAL MEDICINE

## 2017-08-23 RX ORDER — ZOLPIDEM TARTRATE 10 MG/1
10 TABLET ORAL
Qty: 31 TABLET | Refills: 0 | Status: SHIPPED | OUTPATIENT
Start: 2017-08-23 | End: 2017-09-07

## 2017-08-23 ASSESSMENT — ANXIETY QUESTIONNAIRES
6. BECOMING EASILY ANNOYED OR IRRITABLE: NOT AT ALL
2. NOT BEING ABLE TO STOP OR CONTROL WORRYING: SEVERAL DAYS
GAD7 TOTAL SCORE: 2
1. FEELING NERVOUS, ANXIOUS, OR ON EDGE: NOT AT ALL
3. WORRYING TOO MUCH ABOUT DIFFERENT THINGS: NOT AT ALL
5. BEING SO RESTLESS THAT IT IS HARD TO SIT STILL: SEVERAL DAYS
7. FEELING AFRAID AS IF SOMETHING AWFUL MIGHT HAPPEN: NOT AT ALL
IF YOU CHECKED OFF ANY PROBLEMS ON THIS QUESTIONNAIRE, HOW DIFFICULT HAVE THESE PROBLEMS MADE IT FOR YOU TO DO YOUR WORK, TAKE CARE OF THINGS AT HOME, OR GET ALONG WITH OTHER PEOPLE: NOT DIFFICULT AT ALL

## 2017-08-23 ASSESSMENT — PAIN SCALES - GENERAL: PAINLEVEL: NO PAIN (0)

## 2017-08-23 ASSESSMENT — PATIENT HEALTH QUESTIONNAIRE - PHQ9
5. POOR APPETITE OR OVEREATING: NOT AT ALL
SUM OF ALL RESPONSES TO PHQ QUESTIONS 1-9: 2

## 2017-08-23 NOTE — NURSING NOTE
"Chief Complaint   Patient presents with     Wellness Visit     Health Maintenance       Initial /71 (BP Location: Right arm, Patient Position: Chair, Cuff Size: Adult Regular)  Pulse 59  Temp 98.6  F (37  C) (Oral)  Ht 5' 4.86\" (1.647 m)  Wt 142 lb (64.4 kg)  SpO2 96%  BMI 23.73 kg/m2 Estimated body mass index is 23.73 kg/(m^2) as calculated from the following:    Height as of this encounter: 5' 4.86\" (1.647 m).    Weight as of this encounter: 142 lb (64.4 kg).  Medication Reconciliation: complete   Paige Armenta CMA      "

## 2017-08-23 NOTE — PROGRESS NOTES
SUBJECTIVE:   Oh Solares is a 72 year old male who presents for Preventive Visit.  Scalp lesion  VA due to hips    April and might be bursitis.  Working well  6 hours of sleep    Are you in the first 12 months of your Medicare coverage?  No    Physical   Annual:     Getting at least 3 servings of Calcium per day::  Yes    Bi-annual eye exam::  Yes    Dental care twice a year::  Yes    Sleep apnea or symptoms of sleep apnea::  Sleep apnea    Diet::  Regular (no restrictions)    Frequency of exercise::  2-3 days/week    Duration of exercise::  30-45 minutes    Taking medications regularly::  Yes    Medication side effects::  None    Additional concerns today::  YES      COGNITIVE SCREEN  1) Repeat 3 items (Banana, Sunrise, Chair)    2) Clock draw: NORMAL  3) 3 item recall: Recalls 3 objects  Results: NORMAL clock, 1-2 items recalled: COGNITIVE IMPAIRMENT LESS LIKELY    Mini-CogTM Copyright S Cee. Licensed by the author for use in [facility]; reprinted with permission (eamon@[facility]). All rights reserved.          Reviewed and updated as needed this visit by clinical staff  Tobacco  Allergies  Meds  Med Hx  Surg Hx  Fam Hx  Soc Hx        Reviewed and updated as needed this visit by Provider        Social History   Substance Use Topics     Smoking status: Former Smoker     Quit date: 4/15/1984     Smokeless tobacco: Never Used     Alcohol use Yes      Comment: 1 beer every  day       The patient does not drink >3 drinks per day nor >7 drinks per week.          none    Today's PHQ-2 Score:   PHQ-2 ( 1999 [employer]) 8/23/2017   Q1: Little interest or pleasure in doing things 0   Q2: Feeling down, depressed or hopeless 0   PHQ-2 Score 0   Q1: Little interest or pleasure in doing things Not at all   Q2: Feeling down, depressed or hopeless Not at all   PHQ-2 Score 0       Do you feel safe in your environment - Yes    Do you have a Health Care Directive?: No: Advance care planning reviewed with  patient; information given to patient to review.      Current providers sharing in care for this patient include: Patient Care Team:  Brendon Kay MD as PCP - General      Hearing impairment: No    Ability to successfully perform activities of daily living: Yes, no assistance needed     Fall risk:         Home safety:  none identified  click delete button to remove this line now    The following health maintenance items are reviewed in Epic and correct as of today:  Health Maintenance   Topic Date Due     URINE DRUG SCREEN Q1 YR  02/09/1962     YIN QUESTIONNAIRE 1 YEAR  02/09/1965     PHQ-9 Q1YR  02/09/1965     OP ANNUAL INR REFERRAL  07/23/2016     FALL RISK ASSESSMENT  08/02/2017     INFLUENZA VACCINE (SYSTEM ASSIGNED)  09/01/2017     LIPID SCREEN Q5 YR MALE (SYSTEM ASSIGNED)  08/16/2022     ADVANCE DIRECTIVE PLANNING Q5 YRS  08/23/2022     TETANUS IMMUNIZATION (SYSTEM ASSIGNED)  03/25/2024     COLON CANCER SCREEN (SYSTEM ASSIGNED)  09/03/2024     PNEUMOCOCCAL  Completed     AORTIC ANEURYSM SCREENING (SYSTEM ASSIGNED)  Completed     HEPATITIS C SCREENING  Completed     Labs reviewed in EPIC  BP Readings from Last 3 Encounters:   08/23/17 133/71   08/02/16 100/59   11/30/15 122/69    Wt Readings from Last 3 Encounters:   08/23/17 142 lb (64.4 kg)   08/02/16 140 lb (63.5 kg)   11/30/15 140 lb (63.5 kg)                  Patient Active Problem List   Diagnosis     Factor V Leiden mutation (H)     GI bleed     ED (erectile dysfunction)     CARDIOVASCULAR SCREENING; LDL GOAL LESS THAN 160     Advanced directives, counseling/discussion     Sleep stage dysfunction     Anxiety     Dupuytren's contracture - left     Chronic pain     Long-term (current) use of anticoagulants [Z79.01]     Coagulation defect (HCC) [D68.9]     Chronic pain syndrome     Past Surgical History:   Procedure Laterality Date     C VENOUS THROMBOSIS STUDY       CLOSED RX ELBOW DISLOCATION  96    left     COLECTOMY  2002     COLONOSCOPY        "COLONOSCOPY WITH CO2 INSUFFLATION N/A 9/3/2014    Procedure: COLONOSCOPY WITH CO2 INSUFFLATION;  Surgeon: West Jerez MD;  Location: [facility] OR     SMALL BOWEL RESECTION  01/02    partial     VASECTOMY         Social History   Substance Use Topics     Smoking status: Former Smoker     Quit date: 4/15/1984     Smokeless tobacco: Never Used     Alcohol use Yes      Comment: 1 beer every  day     History reviewed. No pertinent family history.      Current Outpatient Prescriptions   Medication Sig Dispense Refill     zolpidem (AMBIEN) 10 MG tablet Take 1 tablet (10 mg) by mouth nightly as needed 31 tablet 0     HYDROcodone-acetaminophen (NORCO) 5-325 MG per tablet Take 1 tablet by mouth every 6 hours as needed for pain 30 tablet 0     warfarin (COUMADIN) 5 MG tablet TAKE 1/2 TABLET (2.5MG) BY MOUTH ON MONDAY & 1 TABLET (5MG) BY MOUTH ALL OTHER DAYS 90 tablet 2     saw palmetto ([facility] SAW PALMETTO) 160 MG CAPS Take 3 capsules by mouth daily. 1 capsule 12     Pediatric Multiple Vit-Vit C (VITAMIN DAILY PO) Take 500 mg by mouth daily.       TYLENOL 325 MG OR TABS as needed                 ROS:  Constitutional, HEENT, cardiovascular, pulmonary, gi and gu systems are negative, except as otherwise noted.      OBJECTIVE:   /71 (BP Location: Right arm, Patient Position: Chair, Cuff Size: Adult Regular)  Pulse 59  Temp 98.6  F (37  C) (Oral)  Ht 5' 4.86\" (1.647 m)  Wt 142 lb (64.4 kg)  SpO2 96%  BMI 23.73 kg/m2 Estimated body mass index is 23.73 kg/(m^2) as calculated from the following:    Height as of this encounter: 5' 4.86\" (1.647 m).    Weight as of this encounter: 142 lb (64.4 kg).  EXAM:   GENERAL: healthy, alert and no distress  NECK: no adenopathy, no asymmetry, masses, or scars and thyroid normal to palpation  RESP: lungs clear to auscultation - no rales, rhonchi or wheezes  CV: regular rate and rhythm, normal S1 S2, no S3 or S4, no murmur, click or rub, no peripheral edema and peripheral pulses " "strong  ABDOMEN: soft, nontender, no hepatosplenomegaly, no masses and bowel sounds normal  MS: no gross musculoskeletal defects noted, no edema    ASSESSMENT / PLAN:       ICD-10-CM    1. Chronic pain syndrome G89.4 Drug  Screen Comprehensive, Urine w/o Reported Meds (Pain Care Package)   2. Primary insomnia F51.01 Drug  Screen Comprehensive, Urine w/o Reported Meds (Pain Care Package)     zolpidem (AMBIEN) 10 MG tablet   3. Factor V Leiden mutation (H) D68.51 INR CLINIC REFERRAL       End of Life Planning:  Patient currently has an advanced directive: Yes.  Practitioner is supportive of decision.    COUNSELING:  Reviewed preventive health counseling, as reflected in patient instructions       Regular exercise       Healthy diet/nutrition       Vision screening       Hearing screening        Estimated body mass index is 23.73 kg/(m^2) as calculated from the following:    Height as of this encounter: 5' 4.86\" (1.647 m).    Weight as of this encounter: 142 lb (64.4 kg).     reports that he quit smoking about 33 years ago. He has never used smokeless tobacco.        Appropriate preventive services were discussed with this patient, including applicable screening as appropriate for cardiovascular disease, diabetes, osteopenia/osteoporosis, and glaucoma.  As appropriate for age/gender, discussed screening for colorectal cancer, prostate cancer, breast cancer, and cervical cancer. Checklist reviewing preventive services available has been given to the patient.    Reviewed patients plan of care and provided an AVS. The Basic Care Plan (routine screening as documented in Health Maintenance) for Oh meets the Care Plan requirement. This Care Plan has been established and reviewed with the Patient.    Counseling Resources:  ATP IV Guidelines  Pooled Cohorts Equation Calculator  Breast Cancer Risk Calculator  FRAX Risk Assessment  ICSI Preventive Guidelines  Dietary Guidelines for Americans, 2010  USDA's MyPlate  ASA " Prophylaxis  Lung CA Screening        ICD-10-CM    1. Chronic pain syndrome G89.4 Drug  Screen Comprehensive, Urine w/o Reported Meds (Pain Care Package)   2. Primary insomnia F51.01 Drug  Screen Comprehensive, Urine w/o Reported Meds (Pain Care Package)     zolpidem (AMBIEN) 10 MG tablet   3. Factor V Leiden mutation (H) D68.51 INR CLINIC REFERRAL       Brendon Kay MD  [facility] for HPI/ROS submitted by the patient on 8/23/2017   PHQ-2 Score: 0

## 2017-08-23 NOTE — MR AVS SNAPSHOT
After Visit Summary   8/23/2017    Oh Solares    MRN: [id number]           Patient Information     Date Of Birth          1945        Visit Information        Provider Department      8/23/2017 2:00 PM Brendon Kay MD [facility]        Today's Diagnoses     Chronic pain syndrome    -  1    Primary insomnia        Factor V Leiden mutation (H)           Follow-ups after your visit        Additional Services     INR CLINIC REFERRAL       Your provider has referred you to INR Services.    Please be aware that coverage of these services is subject to the terms and limitations of your health insurance plan.  Call member services at your health plan with any benefit or coverage questions.    Indication for Anticoagulation: Other: factor v leiden with clot  If nonstandard INR is desired, indicate goal range and explanation: 2-3  Expected Duration of Therapy: Lifetime                  Your next 10 appointments already scheduled     Sep 25, 2017 11:20 AM CDT   Anticoagulation Visit with CP ANTI COAG   [facility] ([facility])    [address] [id number] [phone]              Who to contact     If you have questions or need follow up information about today's clinic visit or your schedule please contact [facility] directly at [phone].  Normal or non-critical lab and imaging results will be communicated to you by MyChart, letter or phone within 4 business days after the clinic has received the results. If you do not hear from us within 7 days, please contact the clinic through MyChart or phone. If you have a critical or abnormal lab result, we will notify you by phone as soon as possible.  Submit refill requests through [employer] or call your pharmacy and they will forward the refill request to us. Please allow 3 business days for your refill to be completed.     "      Additional Information About Your Visit        MyChart Information     [facility] [employer] lets you send messages to your doctor, view your test results, renew your prescriptions, schedule appointments and more. To sign up, go to [email or web address] . Click on \"Log in\" on the left side of the screen, which will take you to the Welcome page. Then click on \"Sign up Now\" on the right side of the page.     You will be asked to enter the access code listed below, as well as some personal information. Please follow the directions to create your username and password.     Your access code is: [email or web address]  Expires: 2017  2:49 PM     Your access code will  in 90 days. If you need help or a new code, please call your [place] clinic or [phone].        Care EveryWhere ID     This is your [facility] EveryWhere ID. This could be used by other organizations to access your [place] medical records  [id number]        Your Vitals Were     Pulse Temperature Height Pulse Oximetry BMI (Body Mass Index)       59 98.6  F (37  C) (Oral) 5' 4.86\" (1.647 m) 96% 23.73 kg/m2        Blood Pressure from Last 3 Encounters:   17 133/71   16 100/59   11/30/15 122/69    Weight from Last 3 Encounters:   17 142 lb (64.4 kg)   16 140 lb (63.5 kg)   11/30/15 140 lb (63.5 kg)              We Performed the Following     Drug  Screen Comprehensive, Urine w/o Reported Meds (Pain Care Package)     INR CLINIC REFERRAL          Today's Medication Changes          These changes are accurate as of: 17  2:49 PM.  If you have any questions, ask your nurse or doctor.               These medicines have changed or have updated prescriptions.        Dose/Directions    zolpidem 10 MG tablet   Commonly known as:  AMBIEN   This may have changed:  See the new instructions.   Used for:  Primary insomnia   Changed by:  Brendon Kay MD        Dose:  10 mg   Take 1 tablet (10 mg) by mouth nightly as needed   Quantity:  31 tablet "   Refills:  0            Where to get your medicines      Some of these will need a paper prescription and others can be bought over the counter.  Ask your nurse if you have questions.     Bring a paper prescription for each of these medications     zolpidem 10 MG tablet                Primary Care Provider Office Phone # Fax #    Brendon Kay [phone]       [address] [facility] [address]        Equal Access to Services     EDUARDO VIEIRA : Hadii aad ku hadasho Soomaali, waaxda luqadaha, qaybta kaalmada adeegyada, waxay idiin hayjordanan adeeg khbritnish laElliotelva ah. So [facility] [phone].    ATENCIÓN: Si sean gerard, tiene a lofton disposición servicios gratuitos de asistencia lingüística. Llame al [phone].    We comply with applicable federal civil rights laws and [place] laws. We do not discriminate on the basis of race, color, national origin, age, disability sex, sexual orientation or gender identity.            Thank you!     Thank you for choosing [facility]  for your care. Our goal is always to provide you with excellent care. Hearing back from our patients is one way we can continue to improve our services. Please take a few minutes to complete the written survey that you may receive in the mail after your visit with us. Thank you!             Your Updated Medication List - Protect others around you: Learn how to safely use, store and throw away your medicines at www.disposemymeds.org.          This list is accurate as of: 8/23/17  2:49 PM.  Always use your most recent med list.                   Brand Name Dispense Instructions for use Diagnosis    [facility] SAW PALMETTO 160 MG Caps   Generic drug:  saw palmetto     1 capsule    Take 3 capsules by mouth daily.        HYDROcodone-acetaminophen 5-325 MG per tablet    NORCO    30 tablet    Take 1 tablet by mouth every 6 hours as needed for pain    Chronic bilateral low back pain without sciatica       TYLENOL  325 MG tablet   Generic drug:  acetaminophen      as needed        VITAMIN DAILY PO      Take 500 mg by mouth daily.    Hematoma       warfarin 5 MG tablet    COUMADIN    90 tablet    TAKE 1/2 TABLET (2.5MG) BY MOUTH ON MONDAY & 1 TABLET (5MG) BY MOUTH ALL OTHER DAYS    Long term (current) use of anticoagulants       zolpidem 10 MG tablet    AMBIEN    31 tablet    Take 1 tablet (10 mg) by mouth nightly as needed    Primary insomnia

## 2017-08-24 ASSESSMENT — ANXIETY QUESTIONNAIRES: GAD7 TOTAL SCORE: 2

## 2017-09-01 LAB — COMPREHEN DRUG ANALYSIS UR: NORMAL

## 2017-09-07 DIAGNOSIS — F51.01 PRIMARY INSOMNIA: ICD-10-CM

## 2017-09-07 NOTE — TELEPHONE ENCOUNTER
zolpidem (AMBIEN) 10 MG tablet      Last Written Prescription Date:  8-23-17  Last Fill Quantity: 31,   # refills: 0  Last Office Visit with [facility], [facility] or [facility] prescribing provider: 8-23-17  Future Office visit:       Routing refill request to provider for review/approval because:  Drug not on the [facility], [facility] or [facility] refill protocol or controlled substance

## 2017-09-08 RX ORDER — ZOLPIDEM TARTRATE 10 MG/1
10 TABLET ORAL
Qty: 31 TABLET | Refills: 0 | Status: SHIPPED | OUTPATIENT
Start: 2017-09-08 | End: 2017-10-12

## 2017-09-25 ENCOUNTER — ANTICOAGULATION THERAPY VISIT (OUTPATIENT)
Dept: NURSING | Facility: CLINIC | Age: 72
End: 2017-09-25
Payer: COMMERCIAL

## 2017-09-25 ENCOUNTER — TELEPHONE (OUTPATIENT)
Dept: FAMILY MEDICINE | Facility: CLINIC | Age: 72
End: 2017-09-25

## 2017-09-25 DIAGNOSIS — D68.9 COAGULATION DEFECT (H): ICD-10-CM

## 2017-09-25 DIAGNOSIS — Z79.01 LONG-TERM (CURRENT) USE OF ANTICOAGULANTS: ICD-10-CM

## 2017-09-25 DIAGNOSIS — M54.50 CHRONIC BILATERAL LOW BACK PAIN WITHOUT SCIATICA: ICD-10-CM

## 2017-09-25 DIAGNOSIS — G89.29 CHRONIC BILATERAL LOW BACK PAIN WITHOUT SCIATICA: ICD-10-CM

## 2017-09-25 DIAGNOSIS — Z23 NEED FOR PROPHYLACTIC VACCINATION AND INOCULATION AGAINST INFLUENZA: Primary | ICD-10-CM

## 2017-09-25 LAB — INR POINT OF CARE: 2 (ref 0.86–1.14)

## 2017-09-25 PROCEDURE — 90662 IIV NO PRSV INCREASED AG IM: CPT

## 2017-09-25 PROCEDURE — 90471 IMMUNIZATION ADMIN: CPT

## 2017-09-25 PROCEDURE — 85610 PROTHROMBIN TIME: CPT | Mod: QW

## 2017-09-25 PROCEDURE — 99207 ZZC NO CHARGE NURSE ONLY: CPT

## 2017-09-25 PROCEDURE — 36416 COLLJ CAPILLARY BLOOD SPEC: CPT

## 2017-09-25 NOTE — MR AVS SNAPSHOT
Oh Solares   9/25/2017 11:20 AM   Anticoagulation Therapy Visit    Description:  72 year old male   Provider:  NAYAN ANTI COAG   Department:  Cp Nurse           INR as of 9/25/2017     Today's INR 2.0      Anticoagulation Summary as of 9/25/2017     INR goal 2.0-3.0   Today's INR 2.0   Full instructions 2.5 mg on Mon; 5 mg all other days   Next INR check 11/6/2017    Indications   Long-term (current) use of anticoagulants [Z79.01] [Z79.01]  Coagulation defect (HCC) [D68.9] [D68.9]         Your next Anticoagulation Clinic appointment(s)     Nov 06, 2017 11:20 AM CST   Anticoagulation Visit with CP ANTI COAG   [facility]    [address] [place] [id number] [phone]              Contact Numbers     [facility]  Please call [phone] or [phone]  to cancel and/or reschedule your appointment.   Please call [phone] with any problems or questions regarding your therapy          September 2017 Details    Sun Mon Tue Wed Thu Fri Sat          1               2                 3               4               5               6               7               8               9                 10               11               12               13               14               15               16                 17               18               19               20               21               22               23                 24               25      2.5 mg   See details      26      5 mg         27      5 mg         28      5 mg         29      5 mg         30      5 mg          Date Details   09/25 This INR check               How to take your warfarin dose     To take:  2.5 mg Take 0.5 of a 5 mg tablet.    To take:  5 mg Take 1 of the 5 mg tablets.           October 2017 Details    Sun Mon Tue Wed Thu Fri Sat     1      5 mg         2      2.5 mg         3      5 mg         4      5 mg         5       5 mg         6      5 mg         7      5 mg           8      5 mg         9      2.5 mg         10      5 mg         11      5 mg         12      5 mg         13      5 mg         14      5 mg           15      5 mg         16      2.5 mg         17      5 mg         18      5 mg         19      5 mg         20      5 mg         21      5 mg           22      5 mg         23      2.5 mg         24      5 mg         25      5 mg         26      5 mg         27      5 mg         28      5 mg           29      5 mg         30      2.5 mg         31      5 mg              Date Details   No additional details            How to take your warfarin dose     To take:  2.5 mg Take 0.5 of a 5 mg tablet.    To take:  5 mg Take 1 of the 5 mg tablets.           November 2017 Details    Sun Mon Tue Wed Thu Fri Sat        1      5 mg         2      5 mg         3      5 mg         4      5 mg           5      5 mg         6            7               8               9               10               11                 12               13               14               15               16               17               18                 19               20               21               22               23               24               25                 26               27               28               29               30                  Date Details   No additional details    Date of next INR:  11/6/2017         How to take your warfarin dose     To take:  2.5 mg Take 0.5 of a 5 mg tablet.    To take:  5 mg Take 1 of the 5 mg tablets.

## 2017-09-25 NOTE — PROGRESS NOTES
ANTICOAGULATION FOLLOW-UP CLINIC VISIT    Patient Name:  Oh Solares  Date:  9/25/2017  Contact Type:  Face to Face    SUBJECTIVE: patient admits he missed one dose when on vacation.  His wife found a warfarin pill in his luggage.  Otherwise denies changes in medicine or diet.  Denies symptoms of bleeding or clotting.  Patient asks for flu vaccine today.     Patient Findings     Positives Missed doses (1 missed dose)           OBJECTIVE    INR Protime   Date Value Ref Range Status   09/25/2017 2.0 (A) 0.86 - 1.14 Final       ASSESSMENT / PLAN  INR assessment THER    Recheck INR In: 6 WEEKS    INR Location Clinic      Anticoagulation Summary as of 9/25/2017     INR goal 2.0-3.0   Today's INR 2.0   Maintenance plan 2.5 mg (5 mg x 0.5) on Mon; 5 mg (5 mg x 1) all other days   Full instructions 2.5 mg on Mon; 5 mg all other days   Weekly total 32.5 mg   No change documented Zulema Genao RN   Plan last modified Zulema Genao RN (10/3/2016)   Next INR check 11/6/2017   Target end date Indefinite    Indications   Long-term (current) use of anticoagulants [Z79.01] [Z79.01]  Coagulation defect (HCC) [D68.9] [D68.9]         Anticoagulation Episode Summary     INR check location     Preferred lab     Send INR reminders to [facility]    Comments       Anticoagulation Care Providers     Provider Role Specialty Phone number    Brendon Kay MD  Internal Medicine [phone]            See the Encounter Report to view Anticoagulation Flowsheet and Dosing Calendar (Go to Encounters tab in chart review, and find the Anticoagulation Therapy Visit)    Dosage adjustment made based on physician directed care plan.    Zulema Genao RN               Injectable Influenza Immunization Documentation    1.  Is the person to be vaccinated sick today?   No    2. Does the person to be vaccinated have an allergy to a component   of the vaccine?   No    3. Has the person to be vaccinated ever had a serious reaction   to  influenza vaccine in the past?   No    4. Has the person to be vaccinated ever had Guillain-Barré syndrome?   No    Form completed by Zulema Lemus RN

## 2017-09-25 NOTE — TELEPHONE ENCOUNTER
Routing refill request to provider for review/approval because:  Drug not on the [facility] refill protocol Hydrocodone  Last filled 7/19/17 for 30 tabs.    Karlee Mcclendon RN  [facility]

## 2017-09-25 NOTE — TELEPHONE ENCOUNTER
Reason for Call:  Medication or medication refill:    Do you use a [facility]?  Name of the pharmacy and phone number for the current request:   at     Name of the medication requested: Hydrocodone    Other request:     Can we leave a detailed message on this number? YES    Phone number patient can be reached at: Other phone number:  [phone]    Best Time: Any    Call taken on 9/25/2017 at 12:30 PM by Clifton Ugarte

## 2017-09-26 RX ORDER — HYDROCODONE BITARTRATE AND ACETAMINOPHEN 5; 325 MG/1; MG/1
1 TABLET ORAL EVERY 6 HOURS PRN
Qty: 30 TABLET | Refills: 0 | Status: SHIPPED | OUTPATIENT
Start: 2017-09-26 | End: 2017-12-19

## 2017-10-12 ENCOUNTER — TELEPHONE (OUTPATIENT)
Dept: FAMILY MEDICINE | Facility: CLINIC | Age: 72
End: 2017-10-12

## 2017-10-12 DIAGNOSIS — F51.01 PRIMARY INSOMNIA: ICD-10-CM

## 2017-10-12 NOTE — TELEPHONE ENCOUNTER
Reason for Call:  Medication or medication refill:    Name of the pharmacy and phone number for the current request:     [id number] [place] - [address]    Name of the medication requested: Zolpidem    Other request: Patient states pharmacy faxed 3 times and has not heard back. Patient is needing refill. Please call patient when completed.    Can we leave a detailed message on this number? YES    Phone number patient can be reached at:   Oh Solares (Self) [phone] (M)       Best Time: anytime    Call taken on 10/12/2017 at 5:35 PM by Winnie Abdul

## 2017-10-12 NOTE — TELEPHONE ENCOUNTER
Patient was last seen 8/23/17 by Dr. Kay, insomnia addressed.  Last ambien Rx was 31 tabs on 9/8/17.  I don't see any requests in Epic.    Cued up.    Routing refill request to provider for review/approval because:  Drug not on the [facility] refill protocol   Lina Byrnes RN  [facility]

## 2017-10-13 RX ORDER — ZOLPIDEM TARTRATE 10 MG/1
10 TABLET ORAL
Qty: 31 TABLET | Refills: 0 | Status: SHIPPED | OUTPATIENT
Start: 2017-10-13 | End: 2017-11-06

## 2017-10-13 NOTE — TELEPHONE ENCOUNTER
Prescription of zolpidem (AMBIEN) 10 MG tablet was faxed to pharmacy.  Spoke with wife and informed.

## 2017-11-06 ENCOUNTER — ANTICOAGULATION THERAPY VISIT (OUTPATIENT)
Dept: NURSING | Facility: CLINIC | Age: 72
End: 2017-11-06
Payer: COMMERCIAL

## 2017-11-06 ENCOUNTER — TELEPHONE (OUTPATIENT)
Dept: FAMILY MEDICINE | Facility: CLINIC | Age: 72
End: 2017-11-06

## 2017-11-06 DIAGNOSIS — Z79.01 LONG-TERM (CURRENT) USE OF ANTICOAGULANTS: ICD-10-CM

## 2017-11-06 DIAGNOSIS — F51.01 PRIMARY INSOMNIA: ICD-10-CM

## 2017-11-06 DIAGNOSIS — D68.9 COAGULATION DEFECT (H): ICD-10-CM

## 2017-11-06 LAB — INR POINT OF CARE: 1.7 (ref 0.86–1.14)

## 2017-11-06 PROCEDURE — 85610 PROTHROMBIN TIME: CPT | Mod: QW

## 2017-11-06 PROCEDURE — 36416 COLLJ CAPILLARY BLOOD SPEC: CPT

## 2017-11-06 PROCEDURE — 99207 ZZC NO CHARGE NURSE ONLY: CPT

## 2017-11-06 NOTE — MR AVS SNAPSHOT
Oh Solares   11/6/2017 11:20 AM   Anticoagulation Therapy Visit    Description:  72 year old male   Provider:  NAYAN ANTI COAG   Department:  C[facility] Nurse           INR as of 11/6/2017     Today's INR 1.7!      Anticoagulation Summary as of 11/6/2017     INR goal 2.0-3.0   Today's INR 1.7!   Full instructions 2.5 mg on Mon; 5 mg all other days   Next INR check 12/18/2017    Indications   Long-term (current) use of anticoagulants [Z79.01] [Z79.01]  Coagulation defect (HCC) [D68.9] [D68.9]         Your next Anticoagulation Clinic appointment(s)     Dec 18, 2017 10:20 AM CST   Anticoagulation Visit with NAYAN ANTI COAG   [facility]    [address] [place] [id number] [phone]              Contact Numbers     [facility]  Please call [phone] or [phone]  to cancel and/or reschedule your appointment.   Please call [phone] with any problems or questions regarding your therapy          November 2017 Details    Sun Mon Tue Wed Thu Fri Sat        1               2               3               4                 5               6      2.5 mg   See details      7      5 mg         8      5 mg         9      5 mg         10      5 mg         11      5 mg           12      5 mg         13      2.5 mg         14      5 mg         15      5 mg         16      5 mg         17      5 mg         18      5 mg           19      5 mg         20      2.5 mg         21      5 mg         22      5 mg         23      5 mg         24      5 mg         25      5 mg           26      5 mg         27      2.5 mg         28      5 mg         29      5 mg         30      5 mg            Date Details   11/06 This INR check               How to take your warfarin dose     To take:  2.5 mg Take 0.5 of a 5 mg tablet.    To take:  5 mg Take 1 of the 5 mg tablets.           December 2017 Details    Sun Mon Tue Wed Thu Fri Sat           1      5 mg         2      5 mg           3      5 mg         4      2.5 mg         5      5 mg         6      5 mg         7      5 mg         8      5 mg         9      5 mg           10      5 mg         11      2.5 mg         12      5 mg         13      5 mg         14      5 mg         15      5 mg         16      5 mg           17      5 mg         18            19               20               21               22               23                 24               25               26               27               28               29               30                 31                      Date Details   No additional details    Date of next INR:  12/18/2017         How to take your warfarin dose     To take:  2.5 mg Take 0.5 of a 5 mg tablet.    To take:  5 mg Take 1 of the 5 mg tablets.

## 2017-11-06 NOTE — PROGRESS NOTES
"  ANTICOAGULATION FOLLOW-UP CLINIC VISIT    Patient Name:  Oh Solares  Date:  11/6/2017  Contact Type:  Face to Face    SUBJECTIVE:    Patient missed his 11:20am appointment, so came at 2:20pm.  Will be charting in 11:20am appointment as that it what  arrived him for.  Patient denies dietary changes, he states he did miss a 5mg dose last week - either Thursday or Friday.  Patient states he did \"cut himself good\" last week but did stop bleeding shortly.  Denies bruising or clotting issues.  As we know why INR is low, will keep the 6 weeks.  Advised patient that he needs to take his Warfarin every day, he states he will work better on that.     Patient Findings     Positives Missed doses    Comments Missed 5mg dose           OBJECTIVE    INR Protime   Date Value Ref Range Status   11/06/2017 1.7 (A) 0.86 - 1.14 Final       ASSESSMENT / PLAN  INR assessment SUB    Recheck INR In: 6 WEEKS    INR Location Clinic      Anticoagulation Summary as of 11/6/2017     INR goal 2.0-3.0   Today's INR 1.7!   Maintenance plan 2.5 mg (5 mg x 0.5) on Mon; 5 mg (5 mg x 1) all other days   Full instructions 2.5 mg on Mon; 5 mg all other days   Weekly total 32.5 mg   No change documented Birdie Contreras, RN   Plan last modified Zulema Genao, RN (10/3/2016)   Next INR check 12/18/2017   Target end date Indefinite    Indications   Long-term (current) use of anticoagulants [Z79.01] [Z79.01]  Coagulation defect (HCC) [D68.9] [D68.9]         Anticoagulation Episode Summary     INR check location     Preferred lab     Send INR reminders to [facility] CL[facility]    Comments       Anticoagulation Care Providers     Provider Role Specialty Phone number    Brendon Kay MD  Internal Medicine [phone]            See the Encounter Report to view Anticoagulation Flowsheet and Dosing Calendar (Go to Encounters tab in chart review, and find the Anticoagulation Therapy Visit)    Dosage adjustment made based on physician directed " care plan.    Birdie Contreras RN

## 2017-11-06 NOTE — TELEPHONE ENCOUNTER
Patient was a no-show for INR appointment today.  Called patient, no answer, left message to call back to INR line.    Birdie Contreras RN  [facility]

## 2017-11-07 NOTE — TELEPHONE ENCOUNTER
Ambien    Last Written Prescription Date: 10/13/17  Last Fill Quantity:31  # refills: 0   Last Office Visit with [facility] or [facility] prescribing provider: 8/21/17    Routing refill request to provider for review/approval because:  Drug not on the [facility] refill protocol   Cathy Rogel RN [facility] Triage.

## 2017-11-09 RX ORDER — ZOLPIDEM TARTRATE 10 MG/1
TABLET ORAL
Qty: 31 TABLET | Refills: 0 | Status: SHIPPED | OUTPATIENT
Start: 2017-11-09 | End: 2017-12-05

## 2017-11-09 NOTE — TELEPHONE ENCOUNTER
Pharmacy is calling to check status of Rx.    [facility] [phone]         Winnie PATEL  [place] Scheduling

## 2017-12-05 DIAGNOSIS — F51.01 PRIMARY INSOMNIA: ICD-10-CM

## 2017-12-06 RX ORDER — ZOLPIDEM TARTRATE 10 MG/1
TABLET ORAL
Qty: 31 TABLET | Refills: 0 | Status: SHIPPED | OUTPATIENT
Start: 2017-12-06 | End: 2018-01-06

## 2017-12-06 NOTE — TELEPHONE ENCOUNTER
Requested Prescriptions   Pending Prescriptions Disp Refills     zolpidem (AMBIEN) 10 MG tablet [Pharmacy Med Name: ZOLPIDEM TARTRATE 10 MG TABLET] 31 tablet 0     Sig: TAKE 1 TABLET BY MOUTH NIGHTY AS NEEDED    There is no refill protocol information for this order        Last refill 11/9/17 # 31  Last OV 8/23/17    Routing refill request to provider for review/approval because:  Drug not on the [facility] refill protocol   Cathy Rogel RN [facility] Triage.

## 2017-12-11 ENCOUNTER — ANTICOAGULATION THERAPY VISIT (OUTPATIENT)
Dept: NURSING | Facility: CLINIC | Age: 72
End: 2017-12-11
Payer: COMMERCIAL

## 2017-12-11 DIAGNOSIS — Z79.01 LONG-TERM (CURRENT) USE OF ANTICOAGULANTS: ICD-10-CM

## 2017-12-11 DIAGNOSIS — D68.9 COAGULATION DEFECT (H): ICD-10-CM

## 2017-12-11 LAB — INR POINT OF CARE: 2 (ref 0.86–1.14)

## 2017-12-11 PROCEDURE — 99207 ZZC NO CHARGE NURSE ONLY: CPT

## 2017-12-11 PROCEDURE — 85610 PROTHROMBIN TIME: CPT | Mod: QW

## 2017-12-11 PROCEDURE — 36416 COLLJ CAPILLARY BLOOD SPEC: CPT

## 2017-12-11 NOTE — PROGRESS NOTES
ANTICOAGULATION FOLLOW-UP CLINIC VISIT    Patient Name:  Oh Solares  Date:  12/11/2017  Contact Type:  Face to Face    SUBJECTIVE: Patient here 1 week early for appointment today.  He reports diet and medicine are the same.  He denies bleeding or clotting symptoms.  No other concerns today.  His INR has been running just below or at very bottom of his range for months now.  Today we discussed doing 5 MG daily of the warfarin for one cycle and recheck per usual.   His past schedule was 2.5 MG x 1 and 5 MG x 6.  He is agreeable to this and will call with any concerns in-between appointments.    Patient Findings     Positives No Problem Findings           OBJECTIVE    INR Protime   Date Value Ref Range Status   12/11/2017 2.0 (A) 0.86 - 1.14 Final       ASSESSMENT / PLAN  INR assessment THER    Recheck INR In: 6 WEEKS    INR Location Clinic      Anticoagulation Summary as of 12/11/2017     INR goal 2.0-3.0   Today's INR 2.0   Maintenance plan 5 mg (5 mg x 1) every day   Full instructions 5 mg every day   Weekly total 35 mg   Plan last modified Zulema Genao RN (12/11/2017)   Next INR check 1/23/2018   Target end date Indefinite    Indications   Long-term (current) use of anticoagulants [Z79.01] [Z79.01]  Coagulation defect (HCC) [D68.9] [D68.9]         Anticoagulation Episode Summary     INR check location     Preferred lab     Send INR reminders to [facility]    Comments       Anticoagulation Care Providers     Provider Role Specialty Phone number    Brendon Kay MD  Internal Medicine [phone]            See the Encounter Report to view Anticoagulation Flowsheet and Dosing Calendar (Go to Encounters tab in chart review, and find the Anticoagulation Therapy Visit)    Dosage adjustment made based on physician directed care plan.    Zulema Genao RN

## 2017-12-11 NOTE — MR AVS SNAPSHOT
Oh Solares   12/11/2017 2:20 PM   Anticoagulation Therapy Visit    Description:  72 year old male   Provider:  CP ANTI COAG   Department:  Cp Nurse           INR as of 12/11/2017     Today's INR 2.0      Anticoagulation Summary as of 12/11/2017     INR goal 2.0-3.0   Today's INR 2.0   Full instructions 5 mg every day   Next INR check 1/23/2018    Indications   Long-term (current) use of anticoagulants [Z79.01] [Z79.01]  Coagulation defect (HCC) [D68.9] [D68.9]         Your next Anticoagulation Clinic appointment(s)     Dec 11, 2017  2:20 PM CST   Anticoagulation Visit with CP ANTI COAG   [facility]    [address] [facility] [id number] [phone]            Jan 23, 2018  8:20 AM CST   Anticoagulation Visit with CP ANTI COAG   [facility]    [address] [facility] [id number] [phone]              Contact Numbers     [facility]  Please call [phone] or [phone]  to cancel and/or reschedule your appointment.   Please call [phone] with any problems or questions regarding your therapy          December 2017 Details    Sun Mon Tue Wed Thu Fri Sat          1               2                 3               4               5               6               7               8               9                 10               11      5 mg   See details      12      5 mg         13      5 mg         14      5 mg         15      5 mg         16      5 mg           17      5 mg         18      5 mg         19      5 mg         20      5 mg         21      5 mg         22      5 mg         23      5 mg           24      5 mg         25      5 mg         26      5 mg         27      5 mg         28      5 mg         29      5 mg         30      5 mg           31      5 mg                Date Details   12/11 This INR  check               How to take your warfarin dose     To take:  5 mg Take 1 of the 5 mg tablets.           January 2018 Details    Sun Mon Tue Wed Thu Fri Sat      1      5 mg         2      5 mg         3      5 mg         4      5 mg         5      5 mg         6      5 mg           7      5 mg         8      5 mg         9      5 mg         10      5 mg         11      5 mg         12      5 mg         13      5 mg           14      5 mg         15      5 mg         16      5 mg         17      5 mg         18      5 mg         19      5 mg         20      5 mg           21      5 mg         22      5 mg         23            24               25               26               27                 28               29               30               31                   Date Details   No additional details    Date of next INR:  1/23/2018         How to take your warfarin dose     To take:  5 mg Take 1 of the 5 mg tablets.

## 2017-12-12 DIAGNOSIS — Z79.01 LONG TERM CURRENT USE OF ANTICOAGULANT THERAPY: ICD-10-CM

## 2017-12-12 RX ORDER — WARFARIN SODIUM 5 MG/1
5 TABLET ORAL DAILY
Qty: 100 TABLET | Refills: 3 | Status: SHIPPED | OUTPATIENT
Start: 2017-12-12 | End: 2019-03-12

## 2017-12-12 NOTE — TELEPHONE ENCOUNTER
Anticoagulation Monitoring INR Value INR Goal Range Therapeutic?   Latest Ref Rng & Units 0.86 - 1.14     12/11/2017 2.0 (A) 2.0-3.0 THER     Anticoagulation Monitoring Last Week Total Next Week Total Weekly Dose   Latest Ref Rng & Units      12/11/2017 32.5 mg 35 mg      Anticoagulation Monitoring Return Date Recheck Instructions Instructions   Latest Ref Rng & Units       12/11/2017 1/23/2018 6 WEEKS 5 mg every day      Anticoagulation Monitoring INR location Visit Report   Latest Ref Rng & Units     12/11/2017 Clinic    warfarin (COUMADIN) 5 MG tablet

## 2017-12-19 DIAGNOSIS — G89.29 CHRONIC BILATERAL LOW BACK PAIN WITHOUT SCIATICA: ICD-10-CM

## 2017-12-19 DIAGNOSIS — M54.50 CHRONIC BILATERAL LOW BACK PAIN WITHOUT SCIATICA: ICD-10-CM

## 2017-12-19 RX ORDER — HYDROCODONE BITARTRATE AND ACETAMINOPHEN 5; 325 MG/1; MG/1
1 TABLET ORAL EVERY 6 HOURS PRN
Qty: 30 TABLET | Refills: 0 | Status: SHIPPED | OUTPATIENT
Start: 2017-12-19 | End: 2018-03-29

## 2017-12-19 NOTE — TELEPHONE ENCOUNTER
Prescription of HYDROcodone-acetaminophen (NORCO) 5-325 MG per tablet was brought to the  and patient informed to .

## 2017-12-19 NOTE — TELEPHONE ENCOUNTER
Reason for Call:  Medication or medication refill:    Do you use a [facility]?  Name of the pharmacy and phone number for the current request:  [facility] [address], [place] [phone]    Name of the medication requested: HYDROcodone-acetaminophen (NORCO) 5-325 MG per tablet    Other request: please call patient when ready for     Can we leave a detailed message on this number? YES    Phone number patient can be reached at: Cell number on file:    Telephone Information:   Mobile [phone]     Best Time: any    Call taken on 12/19/2017 at 9:29 AM by Jennifer Painter      HYDROcodone-acetaminophen (NORCO) 5-325 MG per tablet      Last Written Prescription Date:  9-26-17  Last Fill Quantity: 30,   # refills: 0  Last Office Visit: 8-23-17  Future Office visit:   none    Routing refill request to provider for review/approval because:  Drug not on the [facility], [facility] or  Health refill protocol or controlled substance

## 2018-01-06 DIAGNOSIS — F51.01 PRIMARY INSOMNIA: ICD-10-CM

## 2018-01-08 RX ORDER — ZOLPIDEM TARTRATE 10 MG/1
TABLET ORAL
Qty: 31 TABLET | Refills: 0 | Status: SHIPPED | OUTPATIENT
Start: 2018-01-08 | End: 2018-02-07

## 2018-01-08 NOTE — TELEPHONE ENCOUNTER
Routing refill request to provider for review/approval because:  Drug not on the [facility] refill protocol     Lina Byrnes RN  [facility]

## 2018-01-08 NOTE — TELEPHONE ENCOUNTER
Requested Prescriptions   Pending Prescriptions Disp Refills     zolpidem (AMBIEN) 10 MG tablet [Pharmacy Med Name: ZOLPIDEM TARTRATE 10 MG TABLET] 31 tablet 0     Sig: TAKE 1 TABLET BY MOUTH NIGHTLY AS NEEDED    There is no refill protocol information for this order         Last Written Prescription Date:  12/6/17  Last Fill Quantity: 31,   # refills: 0  Last Office Visit: 8/23/17  Future Office visit:       Routing refill request to provider for review/approval because:  Drug not on the [facility], P or [facility] refill protocol or controlled substance

## 2018-01-23 ENCOUNTER — ANTICOAGULATION THERAPY VISIT (OUTPATIENT)
Dept: NURSING | Facility: CLINIC | Age: 73
End: 2018-01-23

## 2018-01-23 DIAGNOSIS — Z79.01 LONG-TERM (CURRENT) USE OF ANTICOAGULANTS: ICD-10-CM

## 2018-01-23 DIAGNOSIS — D68.9 COAGULATION DEFECT (H): ICD-10-CM

## 2018-01-23 LAB — INR POINT OF CARE: 2.7 (ref 0.86–1.14)

## 2018-01-23 PROCEDURE — 85610 PROTHROMBIN TIME: CPT | Mod: QW

## 2018-01-23 PROCEDURE — 99207 ZZC NO CHARGE NURSE ONLY: CPT

## 2018-01-23 PROCEDURE — 36416 COLLJ CAPILLARY BLOOD SPEC: CPT

## 2018-01-23 NOTE — PROGRESS NOTES
ANTICOAGULATION FOLLOW-UP CLINIC VISIT    Patient Name:  Oh Solares  Date:  1/23/2018  Contact Type:  Face to Face    SUBJECTIVE: Patient reports medicine and diet are the same.  He has not had any signs of bleeding or clotting.  He has been feeling well.  He plans to go to [place] x 1 month and asks his recheck INR be scheduled when he returns.  Plan same dose and recheck.     Patient Findings     Positives No Problem Findings           OBJECTIVE    INR Protime   Date Value Ref Range Status   01/23/2018 2.7 (A) 0.86 - 1.14 Final       ASSESSMENT / PLAN  INR assessment THER    Recheck INR In: 6 WEEKS    INR Location Clinic      Anticoagulation Summary as of 1/23/2018     INR goal 2.0-3.0   Today's INR 2.7   Maintenance plan 5 mg (5 mg x 1) every day   Full instructions 5 mg every day   Weekly total 35 mg   No change documented Zulema Genao, RN   Plan last modified Zulema Genao RN (12/11/2017)   Next INR check 3/8/2018   Target end date Indefinite    Indications   Long-term (current) use of anticoagulants [Z79.01] [Z79.01]  Coagulation defect (HCC) [D68.9] [D68.9]         Anticoagulation Episode Summary     INR check location     Preferred lab     Send INR reminders to [facility]    Comments       Anticoagulation Care Providers     Provider Role Specialty Phone number    Brendon Kay MD  Internal Medicine [phone]            See the Encounter Report to view Anticoagulation Flowsheet and Dosing Calendar (Go to Encounters tab in chart review, and find the Anticoagulation Therapy Visit)    Dosage adjustment made based on physician directed care plan.    Zulema Genao RN

## 2018-01-23 NOTE — MR AVS SNAPSHOT
Oh Solares   1/23/2018 8:20 AM   Anticoagulation Therapy Visit    Description:  72 year old male   Provider:  NAYAN ANTI COAG   Department:  C[facility] Nurse           INR as of 1/23/2018     Today's INR 2.7      Anticoagulation Summary as of 1/23/2018     INR goal 2.0-3.0   Today's INR 2.7   Full instructions 5 mg every day   Next INR check 3/8/2018    Indications   Long-term (current) use of anticoagulants [Z79.01] [Z79.01]  Coagulation defect (HCC) [D68.9] [D68.9]         Your next Anticoagulation Clinic appointment(s)     Mar 08, 2018  9:00 AM CST   Anticoagulation Visit with CP ANTI COAG   [facility]    [address] [address] [phone]              Contact Numbers     [facility]  Please call [phone] or [phone]  to cancel and/or reschedule your appointment.   Please call [phone] with any problems or questions regarding your therapy          January 2018 Details    Sun Mon Tue Wed Thu Fri Sat      1               2               3               4               5               6                 7               8               9               10               11               12               13                 14               15               16               17               18               19               20                 21               22               23      5 mg   See details      24      5 mg         25      5 mg         26      5 mg         27      5 mg           28      5 mg         29      5 mg         30      5 mg         31      5 mg             Date Details   01/23 This INR check               How to take your warfarin dose     To take:  5 mg Take 1 of the 5 mg tablets.           February 2018 Details    Sun Mon Tue Wed Thu Fri Sat         1      5 mg         2      5 mg         3      5 mg           4      5 mg         5      5 mg         6      5 mg         7       5 mg         8      5 mg         9      5 mg         10      5 mg           11      5 mg         12      5 mg         13      5 mg         14      5 mg         15      5 mg         16      5 mg         17      5 mg           18      5 mg         19      5 mg         20      5 mg         21      5 mg         22      5 mg         23      5 mg         24      5 mg           25      5 mg         26      5 mg         27      5 mg         28      5 mg             Date Details   No additional details            How to take your warfarin dose     To take:  5 mg Take 1 of the 5 mg tablets.           March 2018 Details    Sun Mon Tue Wed Thu Fri Sat         1      5 mg         2      5 mg         3      5 mg           4      5 mg         5      5 mg         6      5 mg         7      5 mg         8            9               10                 11               12               13               14               15               16               17                 18               19               20               21               22               23               24                 25               26               27               28               29               30               31                Date Details   No additional details    Date of next INR:  3/8/2018         How to take your warfarin dose     To take:  5 mg Take 1 of the 5 mg tablets.

## 2018-02-04 ENCOUNTER — NURSE TRIAGE (OUTPATIENT)
Dept: NURSING | Facility: CLINIC | Age: 73
End: 2018-02-04

## 2018-02-04 NOTE — TELEPHONE ENCOUNTER
Patient called reporting that he needs his Warfarin prescription sent to the [facility] in [place] due to being down there for 5 weeks and switching insurance that will only allow him to receive his prescriptions from [facility]. [facility] called [facility] and requested them to transfer warfarin prescription to the [facility], [facility], telephone #  [phone]. [facility] notified patient that the prescription was transferred.     Desire Kahn RN/[facility]

## 2018-02-07 ENCOUNTER — TELEPHONE (OUTPATIENT)
Dept: FAMILY MEDICINE | Facility: CLINIC | Age: 73
End: 2018-02-07

## 2018-02-07 DIAGNOSIS — F51.01 PRIMARY INSOMNIA: ICD-10-CM

## 2018-02-07 RX ORDER — ZOLPIDEM TARTRATE 10 MG/1
TABLET ORAL
Qty: 31 TABLET | Refills: 0 | Status: SHIPPED | OUTPATIENT
Start: 2018-02-07 | End: 2018-03-15

## 2018-02-07 NOTE — TELEPHONE ENCOUNTER
Reason for Call:  Medication or medication refill:    Do you use a [facility]?  Name of the pharmacy and phone number for the current request:  [facility] [phone] [facility] [address] [facility] [address]    Name of the medication requested: zolpidem (AMBIEN) 10 MG tablet    Other request:  no    Can we leave a detailed message on this number? YES     Phone number patient can be reached at: [phone]    Best Time:  anytime    Call taken on 2/7/2018 at 10:07 AM by Edilma Diego

## 2018-02-07 NOTE — TELEPHONE ENCOUNTER
Requested Prescriptions   Pending Prescriptions Disp Refills     zolpidem (AMBIEN) 10 MG tablet 31 tablet 0    There is no refill protocol information for this order        Last refill 1/8/18 # 31  Last OV 8/23/17.    Cathy Rogel, [facility] Triage.

## 2018-03-09 ENCOUNTER — ANTICOAGULATION THERAPY VISIT (OUTPATIENT)
Dept: NURSING | Facility: CLINIC | Age: 73
End: 2018-03-09
Payer: COMMERCIAL

## 2018-03-09 DIAGNOSIS — Z79.01 LONG-TERM (CURRENT) USE OF ANTICOAGULANTS: ICD-10-CM

## 2018-03-09 DIAGNOSIS — D68.9 COAGULATION DEFECT (H): ICD-10-CM

## 2018-03-09 LAB — INR POINT OF CARE: 2.2 (ref 0.86–1.14)

## 2018-03-09 PROCEDURE — 99207 ZZC NO CHARGE NURSE ONLY: CPT

## 2018-03-09 PROCEDURE — 85610 PROTHROMBIN TIME: CPT | Mod: QW

## 2018-03-09 PROCEDURE — 36416 COLLJ CAPILLARY BLOOD SPEC: CPT

## 2018-03-09 NOTE — PROGRESS NOTES
ANTICOAGULATION FOLLOW-UP CLINIC VISIT    Patient Name:  Oh Solares  Date:  3/9/2018  Contact Type:  Face to Face    SUBJECTIVE: Patient here for routine INR.  He just drove back from [place] vacation last evening.  He reports everything is the same, no illness, no concerns or issues.  Plan same warfarin dose and recheck.  Patient agreeable to plan of care.     Patient Findings     Positives No Problem Findings           OBJECTIVE    INR Protime   Date Value Ref Range Status   03/09/2018 2.2 (A) 0.86 - 1.14 Final       ASSESSMENT / PLAN  INR assessment THER    Recheck INR In: 4 WEEKS    INR Location Clinic      Anticoagulation Summary as of 3/9/2018     INR goal 2.0-3.0   Today's INR 2.2   Maintenance plan 5 mg (5 mg x 1) every day   Full instructions 5 mg every day   Weekly total 35 mg   No change documented Zulema Genao, RN   Plan last modified Zulema Genao RN (12/11/2017)   Next INR check 4/19/2018   Target end date Indefinite    Indications   Long-term (current) use of anticoagulants [Z79.01] [Z79.01]  Coagulation defect (HCC) [D68.9] [D68.9]         Anticoagulation Episode Summary     INR check location     Preferred lab     Send INR reminders to [facility]    Comments       Anticoagulation Care Providers     Provider Role Specialty Phone number    Brendon Kay MD  Internal Medicine [phone]            See the Encounter Report to view Anticoagulation Flowsheet and Dosing Calendar (Go to Encounters tab in chart review, and find the Anticoagulation Therapy Visit)    Dosage adjustment made based on physician directed care plan.    Zulema Genao RN

## 2018-03-09 NOTE — MR AVS SNAPSHOT
Oh Solares   3/9/2018 1:40 PM   Anticoagulation Therapy Visit    Description:  73 year old male   Provider:  CP ANTI COAG   Department:  Cp Nurse           INR as of 3/9/2018     Today's INR 2.2      Anticoagulation Summary as of 3/9/2018     INR goal 2.0-3.0   Today's INR 2.2   Full instructions 5 mg every day   Next INR check 4/19/2018    Indications   Long-term (current) use of anticoagulants [Z79.01] [Z79.01]  Coagulation defect (HCC) [D68.9] [D68.9]         Your next Anticoagulation Clinic appointment(s)     Mar 09, 2018  1:40 PM CST   Anticoagulation Visit with CP ANTI COAG   [facility]    [address] [facility] [id number] [phone]            Apr 19, 2018  1:00 PM CDT   Anticoagulation Visit with CP ANTI COAG   [facility]    [address] [facility] [id number] [phone]              Contact Numbers     [facility]  Please call [phone] or [phone]  to cancel and/or reschedule your appointment.   Please call [phone] with any problems or questions regarding your therapy          March 2018 Details    Sun Mon Tue Wed Thu Fri Sat         1               2               3                 4               5               6               7               8               9      5 mg   See details      10      5 mg           11      5 mg         12      5 mg         13      5 mg         14      5 mg         15      5 mg         16      5 mg         17      5 mg           18      5 mg         19      5 mg         20      5 mg         21      5 mg         22      5 mg         23      5 mg         24      5 mg           25      5 mg         26      5 mg         27      5 mg         28      5 mg         29      5 mg         30      5 mg         31      5 mg          Date Details   03/09 This INR check                How to take your warfarin dose     To take:  5 mg Take 1 of the 5 mg tablets.           April 2018 Details    Sun Mon Tue Wed Thu Fri Sat     1      5 mg         2      5 mg         3      5 mg         4      5 mg         5      5 mg         6      5 mg         7      5 mg           8      5 mg         9      5 mg         10      5 mg         11      5 mg         12      5 mg         13      5 mg         14      5 mg           15      5 mg         16      5 mg         17      5 mg         18      5 mg         19            20               21                 22               23               24               25               26               27               28                 29               30                     Date Details   No additional details    Date of next INR:  4/19/2018         How to take your warfarin dose     To take:  5 mg Take 1 of the 5 mg tablets.

## 2018-03-15 ENCOUNTER — TELEPHONE (OUTPATIENT)
Dept: FAMILY MEDICINE | Facility: CLINIC | Age: 73
End: 2018-03-15

## 2018-03-15 DIAGNOSIS — F51.01 PRIMARY INSOMNIA: ICD-10-CM

## 2018-03-15 NOTE — TELEPHONE ENCOUNTER
Reason for Call:  Other prescription please send to [facility]    Detailed comments: please refill medication  zolpidem (AMBIEN) 10 MG tablet [facility] send to [facility] [address] [facility] in [place] [phone]    Phone Number Patient can be reached at: Home number on file [phone] (home) please call when sent to pharmacy     Best Time: any    Can we leave a detailed message on this number? YES    Call taken on 3/15/2018 at 4:27 PM by Nicole Correia

## 2018-03-15 NOTE — TELEPHONE ENCOUNTER
Patient last seen 8/23/17.  Last Rx Ambien was 31 tabs on 2/7/18.    Routing refill request to provider for review/approval because:  Drug not on the [facility] refill protocol     Lina Byrnes RN  [facility]

## 2018-03-16 RX ORDER — ZOLPIDEM TARTRATE 10 MG/1
TABLET ORAL
Qty: 31 TABLET | Refills: 0 | Status: SHIPPED | OUTPATIENT
Start: 2018-03-16 | End: 2018-04-12

## 2018-04-10 ENCOUNTER — TRANSFERRED RECORDS (OUTPATIENT)
Dept: HEALTH INFORMATION MANAGEMENT | Facility: CLINIC | Age: 73
End: 2018-04-10

## 2018-04-12 DIAGNOSIS — F51.01 PRIMARY INSOMNIA: ICD-10-CM

## 2018-04-12 RX ORDER — ZOLPIDEM TARTRATE 10 MG/1
TABLET ORAL
Qty: 31 TABLET | Refills: 0 | Status: SHIPPED | OUTPATIENT
Start: 2018-04-12 | End: 2018-05-14

## 2018-04-12 NOTE — TELEPHONE ENCOUNTER
Requested Prescriptions   Pending Prescriptions Disp Refills     zolpidem (AMBIEN) 10 MG tablet [Pharmacy Med Name: ZOLPIDEM 10MG       TAB] 31 tablet 0     Sig: TAKE 1 TABLET BY MOUTH ONCE A DAY NIGHTLY AS NEEDED    There is no refill protocol information for this order         Last Written Prescription Date:  3-16-18  Last Fill Quantity: 31,   # refills: 0  Last Office Visit:   Future Office visit:       Routing refill request to provider for review/approval because:  Drug not on the [facility], [facility] or [facility] refill protocol or controlled substance

## 2018-04-20 ENCOUNTER — ANTICOAGULATION THERAPY VISIT (OUTPATIENT)
Dept: NURSING | Facility: CLINIC | Age: 73
End: 2018-04-20
Payer: COMMERCIAL

## 2018-04-20 DIAGNOSIS — Z79.01 LONG-TERM (CURRENT) USE OF ANTICOAGULANTS: ICD-10-CM

## 2018-04-20 DIAGNOSIS — D68.9 COAGULATION DEFECT (H): ICD-10-CM

## 2018-04-20 LAB — INR POINT OF CARE: 1.6 (ref 0.86–1.14)

## 2018-04-20 PROCEDURE — 85610 PROTHROMBIN TIME: CPT | Mod: QW

## 2018-04-20 PROCEDURE — 99207 ZZC NO CHARGE NURSE ONLY: CPT

## 2018-04-20 PROCEDURE — 36416 COLLJ CAPILLARY BLOOD SPEC: CPT

## 2018-04-20 NOTE — MR AVS SNAPSHOT
Oh Solares   4/20/2018 8:40 AM   Anticoagulation Therapy Visit    Description:  73 year old male   Provider:  NAYAN ANTI COAG   Department:  Cp Nurse           INR as of 4/20/2018     Today's INR 1.6!      Anticoagulation Summary as of 4/20/2018     INR goal 2.0-3.0   Today's INR 1.6!   Full instructions 4/20: 7.5 mg; Otherwise 5 mg every day   Next INR check 6/1/2018    Indications   Long-term (current) use of anticoagulants [Z79.01] [Z79.01]  Coagulation defect (HCC) [D68.9] [D68.9]         Your next Anticoagulation Clinic appointment(s)     Jun 01, 2018  7:20 AM CDT   Anticoagulation Visit with CP ANTI COAG   [facility]    [address] [place] [address] [phone]              Contact Numbers     [facility]  Please call [phone] or [phone]  to cancel and/or reschedule your appointment.   Please call [phone] with any problems or questions regarding your therapy          April 2018 Details    Sun Mon Tue Wed Thu Fri Sat     1               2               3               4               5               6               7                 8               9               10               11               12               13               14                 15               16               17               18               19               20      7.5 mg   See details      21      5 mg           22      5 mg         23      5 mg         24      5 mg         25      5 mg         26      5 mg         27      5 mg         28      5 mg           29      5 mg         30      5 mg               Date Details   04/20 This INR check               How to take your warfarin dose     To take:  5 mg Take 1 of the 5 mg tablets.    To take:  7.5 mg Take 1.5 of the 5 mg tablets.           May 2018 Details    Sun Mon Tue Wed Thu Fri Sat       1      5 mg         2      5 mg         3      5 mg         4       5 mg         5      5 mg           6      5 mg         7      5 mg         8      5 mg         9      5 mg         10      5 mg         11      5 mg         12      5 mg           13      5 mg         14      5 mg         15      5 mg         16      5 mg         17      5 mg         18      5 mg         19      5 mg           20      5 mg         21      5 mg         22      5 mg         23      5 mg         24      5 mg         25      5 mg         26      5 mg           27      5 mg         28      5 mg         29      5 mg         30      5 mg         31      5 mg            Date Details   No additional details            How to take your warfarin dose     To take:  5 mg Take 1 of the 5 mg tablets.           June 2018 Details    Sun Mon Tue Wed Thu Fri Sat          1            2                 3               4               5               6               7               8               9                 10               11               12               13               14               15               16                 17               18               19               20               21               22               23                 24               25               26               27               28               29               30                Date Details   No additional details    Date of next INR:  6/1/2018         How to take your warfarin dose     To take:  5 mg Take 1 of the 5 mg tablets.

## 2018-04-20 NOTE — PROGRESS NOTES
ANTICOAGULATION FOLLOW-UP CLINIC VISIT    Patient Name:  Oh Solares  Date:  4/20/2018  Contact Type:  Face to Face    SUBJECTIVE: Patient missed his appointment earlier this week, but is here today.  He states everything is the same for him, no changes or problems.  He feels well and is surprised with his low INR today.  Plan extra warfarin today x 1 and then resume usual dose.  I advised a 2 week follow up, but he is not willing to do that.  He arranged his usual six week recheck because he has insurance co-pay with this visit.     Patient Findings     Positives No Problem Findings           OBJECTIVE    INR Protime   Date Value Ref Range Status   04/20/2018 1.6 (A) 0.86 - 1.14 Final       ASSESSMENT / PLAN  INR assessment SUB    Recheck INR In: 6 WEEKS    INR Location Clinic      Anticoagulation Summary as of 4/20/2018     INR goal 2.0-3.0   Today's INR 1.6!   Maintenance plan 5 mg (5 mg x 1) every day   Full instructions 4/20: 7.5 mg; Otherwise 5 mg every day   Weekly total 35 mg   Plan last modified Zulema Genao RN (12/11/2017)   Next INR check 6/1/2018   Target end date Indefinite    Indications   Long-term (current) use of anticoagulants [Z79.01] [Z79.01]  Coagulation defect (HCC) [D68.9] [D68.9]         Anticoagulation Episode Summary     INR check location     Preferred lab     Send INR reminders to [facility]    Comments       Anticoagulation Care Providers     Provider Role Specialty Phone number    Brendon Kay MD  Internal Medicine [phone]            See the Encounter Report to view Anticoagulation Flowsheet and Dosing Calendar (Go to Encounters tab in chart review, and find the Anticoagulation Therapy Visit)    Dosage adjustment made based on physician directed care plan.    Zulema Genao RN

## 2018-05-14 DIAGNOSIS — F51.01 PRIMARY INSOMNIA: ICD-10-CM

## 2018-05-14 NOTE — TELEPHONE ENCOUNTER
Requested Prescriptions   Pending Prescriptions Disp Refills     zolpidem (AMBIEN) 10 MG tablet [Pharmacy Med Name: ZOLPIDEM 10MG       TAB] 31 tablet 0     Sig: TAKE 1 TABLET BY MOUTH ONCE DAILY NIGHTLY AS NEEDED    There is no refill protocol information for this order         Last Written Prescription Date:  4-12-18  Last Fill Quantity: 31,   # refills: 0  Last Office Visit:   Future Office visit:       Routing refill request to provider for review/approval because:  Drug not on the [facility], [facility] or [facility] refill protocol or controlled substance

## 2018-05-15 RX ORDER — ZOLPIDEM TARTRATE 10 MG/1
TABLET ORAL
Qty: 31 TABLET | Refills: 0 | Status: SHIPPED | OUTPATIENT
Start: 2018-05-15 | End: 2018-06-14

## 2018-05-28 ENCOUNTER — TELEPHONE (OUTPATIENT)
Dept: FAMILY MEDICINE | Facility: CLINIC | Age: 73
End: 2018-05-28

## 2018-05-28 DIAGNOSIS — G89.29 CHRONIC BILATERAL LOW BACK PAIN WITHOUT SCIATICA: ICD-10-CM

## 2018-05-28 DIAGNOSIS — M54.50 CHRONIC BILATERAL LOW BACK PAIN WITHOUT SCIATICA: ICD-10-CM

## 2018-05-28 NOTE — TELEPHONE ENCOUNTER
Patient requesting refill (picks up hard copy) of [place]. Patient can be reached at [phone], ok to leave message.

## 2018-05-29 RX ORDER — HYDROCODONE BITARTRATE AND ACETAMINOPHEN 5; 325 MG/1; MG/1
1 TABLET ORAL EVERY 6 HOURS PRN
Qty: 30 TABLET | Refills: 0 | Status: SHIPPED | OUTPATIENT
Start: 2018-05-29 | End: 2018-07-02

## 2018-05-29 NOTE — TELEPHONE ENCOUNTER
Prescription of HYDROcodone-acetaminophen (NORCO) 5-325 MG per tablet was brought to the .  Left detailed message, informing patient.

## 2018-06-01 ENCOUNTER — ANTICOAGULATION THERAPY VISIT (OUTPATIENT)
Dept: NURSING | Facility: CLINIC | Age: 73
End: 2018-06-01
Payer: COMMERCIAL

## 2018-06-01 DIAGNOSIS — Z79.01 LONG-TERM (CURRENT) USE OF ANTICOAGULANTS: ICD-10-CM

## 2018-06-01 DIAGNOSIS — D68.9 COAGULATION DEFECT (H): ICD-10-CM

## 2018-06-01 LAB — INR POINT OF CARE: 1.7 (ref 0.86–1.14)

## 2018-06-01 PROCEDURE — 85610 PROTHROMBIN TIME: CPT | Mod: QW

## 2018-06-01 PROCEDURE — 99207 ZZC NO CHARGE NURSE ONLY: CPT

## 2018-06-01 PROCEDURE — 36416 COLLJ CAPILLARY BLOOD SPEC: CPT

## 2018-06-01 NOTE — MR AVS SNAPSHOT
Oh Solares   6/1/2018 7:20 AM   Anticoagulation Therapy Visit    Description:  73 year old male   Provider:  CP ANTI COAG   Department:  C[facility]           INR as of 6/1/2018     Today's INR 1.7!      Anticoagulation Summary as of 6/1/2018     INR goal 2.0-3.0   Today's INR 1.7!   Full warfarin instructions 6/2: 7.5 mg; Otherwise 7.5 mg on Mon, Fri; 5 mg all other days   Next INR check 6/11/2018    Indications   Long-term (current) use of anticoagulants [Z79.01] [Z79.01]  Coagulation defect (HCC) [D68.9] [D68.9]         Your next Anticoagulation Clinic appointment(s)     Jun 01, 2018  7:20 AM CDT   Anticoagulation Visit with CP ANTI COAG   [facility]    [address] [facility] [id number] [phone]            Jun 11, 2018 12:40 PM CDT   Anticoagulation Visit with CP ANTI COAG   [facility] ([facility]    [address] [place] [id number] [phone]              Contact Numbers     [facility]  Please call [phone] or [phone]  to cancel and/or reschedule your appointment.   Please call [phone] with any problems or questions regarding your therapy          June 2018 Details    Sun Mon Tue Wed Thu Fri Sat          1      7.5 mg   See details      2      7.5 mg           3      5 mg         4      7.5 mg         5      5 mg         6      5 mg         7      5 mg         8      7.5 mg         9      5 mg           10      5 mg         11            12               13               14               15               16                 17               18               19               20               21               22               23                 24               25               26               27               28               29               30                Date Details   06/01 This INR check       Date  of next INR:  6/11/2018         How to take your warfarin dose     To take:  5 mg Take 1 of the 5 mg tablets.    To take:  7.5 mg Take 1.5 of the 5 mg tablets.

## 2018-06-01 NOTE — PROGRESS NOTES
ANTICOAGULATION FOLLOW-UP CLINIC VISIT    Patient Name:  Oh Solares  Date:  6/1/2018  Contact Type:  Face to Face    SUBJECTIVE: Patient here with 2nd sub-therapeutic INR and thinks his diet is the reason, see below.  He states he forgot he is trying to eat healthier and wants to continue.  We discussed the need to see him earlier than 6 weeks to figure out warfarin dose to keep him in therapeutic range.  Last visit patient was not agreeable to this, but today  He verbalized understanding.  Currently denies symptoms of clotting or bleeding.  He feels well and he denies medication changes.     Patient Findings     Positives Change in diet/appetite (eating more veg's and trying to lose weight)           OBJECTIVE    INR Protime   Date Value Ref Range Status   06/01/2018 1.7 (A) 0.86 - 1.14 Final       ASSESSMENT / PLAN  INR assessment SUB    Recheck INR In: 10 DAYS    INR Location Clinic      Anticoagulation Summary as of 6/1/2018     INR goal 2.0-3.0   Today's INR 1.7!   Warfarin maintenance plan 7.5 mg (5 mg x 1.5) on Mon, Fri; 5 mg (5 mg x 1) all other days   Full warfarin instructions 6/2: 7.5 mg; Otherwise 7.5 mg on Mon, Fri; 5 mg all other days   Weekly warfarin total 40 mg   Plan last modified Zulema Genao, RN (6/1/2018)   Next INR check 6/11/2018   Target end date Indefinite    Indications   Long-term (current) use of anticoagulants [Z79.01] [Z79.01]  Coagulation defect (HCC) [D68.9] [D68.9]         Anticoagulation Episode Summary     INR check location     Preferred lab     Send INR reminders to [facility]    Comments       Anticoagulation Care Providers     Provider Role Specialty Phone number    Brendon Kay MD  Internal Medicine [phone]            See the Encounter Report to view Anticoagulation Flowsheet and Dosing Calendar (Go to Encounters tab in chart review, and find the Anticoagulation Therapy Visit)    Dosage adjustment made based on physician directed care  plan.    Zulema Genao RN

## 2018-06-11 ENCOUNTER — ANTICOAGULATION THERAPY VISIT (OUTPATIENT)
Dept: NURSING | Facility: CLINIC | Age: 73
End: 2018-06-11
Payer: COMMERCIAL

## 2018-06-11 DIAGNOSIS — D68.9 COAGULATION DEFECT (H): ICD-10-CM

## 2018-06-11 DIAGNOSIS — Z79.01 LONG-TERM (CURRENT) USE OF ANTICOAGULANTS: ICD-10-CM

## 2018-06-11 LAB — INR POINT OF CARE: 2.6 (ref 0.86–1.14)

## 2018-06-11 PROCEDURE — 36416 COLLJ CAPILLARY BLOOD SPEC: CPT

## 2018-06-11 PROCEDURE — 85610 PROTHROMBIN TIME: CPT | Mod: QW

## 2018-06-11 PROCEDURE — 99207 ZZC NO CHARGE NURSE ONLY: CPT

## 2018-06-11 NOTE — PROGRESS NOTES
ANTICOAGULATION FOLLOW-UP CLINIC VISIT    Patient Name:  Oh Solares  Date:  6/11/2018  Contact Type:  Face to Face    SUBJECTIVE: Patient is here for 2 week recheck following warfarin dose adjustment due to sub-therapeutic readings, see previous notes.  He denies any other changes or symptoms of concern.  He verified he followed the recommended warfarin dose and he is mid-range today.  Plan to continue the warfarin dose of 7.5 MG Mondays and Fridays and 5 MG all other days with his usual recheck.  Patient agreeable to plan of care.     Patient Findings     Positives No Problem Findings           OBJECTIVE    INR Protime   Date Value Ref Range Status   06/11/2018 2.6 (A) 0.86 - 1.14 Final       ASSESSMENT / PLAN  INR assessment THER    Recheck INR In: 6 WEEKS    INR Location Clinic      Anticoagulation Summary as of 6/11/2018     INR goal 2.0-3.0   Today's INR 2.6   Warfarin maintenance plan 7.5 mg (5 mg x 1.5) on Mon, Fri; 5 mg (5 mg x 1) all other days   Full warfarin instructions 7.5 mg on Mon, Fri; 5 mg all other days   Weekly warfarin total 40 mg   No change documented Zulema Genao, RN   Plan last modified Zulema Genao, RN (6/1/2018)   Next INR check 7/23/2018   Target end date Indefinite    Indications   Long-term (current) use of anticoagulants [Z79.01] [Z79.01]  Coagulation defect (HCC) [D68.9] [D68.9]         Anticoagulation Episode Summary     INR check location     Preferred lab     Send INR reminders to [facility]    Comments       Anticoagulation Care Providers     Provider Role Specialty Phone number    Brendon Kay MD  Internal Medicine [phone]            See the Encounter Report to view Anticoagulation Flowsheet and Dosing Calendar (Go to Encounters tab in chart review, and find the Anticoagulation Therapy Visit)    Dosage adjustment made based on physician directed care plan.    Zulema Genao RN

## 2018-06-11 NOTE — MR AVS SNAPSHOT
Ohbriseida Solares   6/11/2018 12:00 PM   Anticoagulation Therapy Visit    Description:  73 year old male   Provider:  NAYAN ANTI COAG   Department:  [facility]           INR as of 6/11/2018     Today's INR 2.6      Anticoagulation Summary as of 6/11/2018     INR goal 2.0-3.0   Today's INR 2.6   Full warfarin instructions 7.5 mg on Mon, Fri; 5 mg all other days   Next INR check 7/23/2018    Indications   Long-term (current) use of anticoagulants [Z79.01] [Z79.01]  Coagulation defect (HCC) [D68.9] [D68.9]         Your next Anticoagulation Clinic appointment(s)     Jul 23, 2018  7:20 AM CDT   Anticoagulation Visit with NAYAN ANTI COAG   [facility]    [address] [place] [address] [phone]              Contact Numbers     [facility]  Please call [phone] or [phone]  to cancel and/or reschedule your appointment.   Please call [phone] with any problems or questions regarding your therapy          June 2018 Details    Sun Mon Tue Wed Thu Fri Sat          1               2                 3               4               5               6               7               8               9                 10               11      7.5 mg   See details      12      5 mg         13      5 mg         14      5 mg         15      7.5 mg         16      5 mg           17      5 mg         18      7.5 mg         19      5 mg         20      5 mg         21      5 mg         22      7.5 mg         23      5 mg           24      5 mg         25      7.5 mg         26      5 mg         27      5 mg         28      5 mg         29      7.5 mg         30      5 mg          Date Details   06/11 This INR check               How to take your warfarin dose     To take:  5 mg Take 1 of the 5 mg tablets.    To take:  7.5 mg Take 1.5 of the 5 mg tablets.           July 2018 Details    Sun Mon Tue Wed Thu Fri Sat     1      5 mg          2      7.5 mg         3      5 mg         4      5 mg         5      5 mg         6      7.5 mg         7      5 mg           8      5 mg         9      7.5 mg         10      5 mg         11      5 mg         12      5 mg         13      7.5 mg         14      5 mg           15      5 mg         16      7.5 mg         17      5 mg         18      5 mg         19      5 mg         20      7.5 mg         21      5 mg           22      5 mg         23            24               25               26               27               28                 29               30               31                    Date Details   No additional details    Date of next INR:  7/23/2018         How to take your warfarin dose     To take:  5 mg Take 1 of the 5 mg tablets.    To take:  7.5 mg Take 1.5 of the 5 mg tablets.

## 2018-06-14 DIAGNOSIS — F51.01 PRIMARY INSOMNIA: ICD-10-CM

## 2018-06-14 NOTE — TELEPHONE ENCOUNTER
Requested Prescriptions   Pending Prescriptions Disp Refills     zolpidem (AMBIEN) 10 MG tablet [Pharmacy Med Name: ZOLPIDEM 10MG       TAB] 31 tablet 0     Sig: TAKE 1 TABLET BY MOUTH ONCE DAILY NIGHTLY AS NEEDED    There is no refill protocol information for this order         Last Written Prescription Date:  5/15/18  Last Fill Quantity: 31,   # refills: 0  Last Office Visit: 8/23/17  Future Office visit:       Routing refill request to provider for review/approval because:  Drug not on the [facility], P or [facility] refill protocol or controlled substance

## 2018-06-15 RX ORDER — ZOLPIDEM TARTRATE 10 MG/1
TABLET ORAL
Qty: 31 TABLET | Refills: 0 | Status: SHIPPED | OUTPATIENT
Start: 2018-06-15 | End: 2018-07-14

## 2018-07-02 DIAGNOSIS — M54.50 CHRONIC BILATERAL LOW BACK PAIN WITHOUT SCIATICA: ICD-10-CM

## 2018-07-02 DIAGNOSIS — G89.29 CHRONIC BILATERAL LOW BACK PAIN WITHOUT SCIATICA: ICD-10-CM

## 2018-07-02 RX ORDER — HYDROCODONE BITARTRATE AND ACETAMINOPHEN 5; 325 MG/1; MG/1
1 TABLET ORAL EVERY 6 HOURS PRN
Qty: 30 TABLET | Refills: 0 | Status: SHIPPED | OUTPATIENT
Start: 2018-07-02 | End: 2018-09-04

## 2018-07-02 NOTE — TELEPHONE ENCOUNTER
Reason for Call:  Medication or medication refill:    Do you use a [facility]?  Name of the pharmacy and phone number for the current request:  [facility], pended    Name of the medication requested: HYDROcodone-acetaminophen (NORCO) 5-325 MG per tablet    Other request: Patient calling to request refill.  Please call when ready for .    Can we leave a detailed message on this number? YES    Phone number patient can be reached at: Cell number on file:    Telephone Information:   Mobile [phone]       Best Time: any    Call taken on 7/2/2018 at 12:41 PM by Jennifer Painter    Requested Prescriptions   Pending Prescriptions Disp Refills     HYDROcodone-acetaminophen (NORCO) 5-325 MG per tablet 30 tablet 0     Sig: Take 1 tablet by mouth every 6 hours as needed for pain    There is no refill protocol information for this order

## 2018-07-03 NOTE — TELEPHONE ENCOUNTER
Patient picked up his script at the  on 7/3/18.    Yusra DUMONT  Patient Representative  [place]

## 2018-07-14 DIAGNOSIS — F51.01 PRIMARY INSOMNIA: ICD-10-CM

## 2018-07-16 RX ORDER — ZOLPIDEM TARTRATE 10 MG/1
TABLET ORAL
Qty: 31 TABLET | Refills: 0 | Status: SHIPPED | OUTPATIENT
Start: 2018-07-16 | End: 2018-08-13

## 2018-07-16 NOTE — TELEPHONE ENCOUNTER
Requested Prescriptions   Pending Prescriptions Disp Refills     zolpidem (AMBIEN) 10 MG tablet [Pharmacy Med Name: ZOLPIDEM 10MG       TAB] 31 tablet 0     Sig: TAKE 1 TABLET BY MOUTH ONCE DAILY IN THE EVENING AS NEEDED    There is no refill protocol information for this order         Last Written Prescription Date:  6/15/18  Last Fill Quantity: 31,   # refills: 0  Last Office Visit: 8/23/17  Future Office visit:       Routing refill request to provider for review/approval because:  Drug not on the [facility], [facility] or [facility] refill protocol or controlled substance

## 2018-07-23 ENCOUNTER — ANTICOAGULATION THERAPY VISIT (OUTPATIENT)
Dept: NURSING | Facility: CLINIC | Age: 73
End: 2018-07-23
Payer: COMMERCIAL

## 2018-07-23 DIAGNOSIS — D68.9 COAGULATION DEFECT (H): ICD-10-CM

## 2018-07-23 DIAGNOSIS — Z79.01 LONG-TERM (CURRENT) USE OF ANTICOAGULANTS: ICD-10-CM

## 2018-07-23 LAB — INR POINT OF CARE: 1.7 (ref 0.86–1.14)

## 2018-07-23 PROCEDURE — 99207 ZZC NO CHARGE NURSE ONLY: CPT

## 2018-07-23 PROCEDURE — 36416 COLLJ CAPILLARY BLOOD SPEC: CPT

## 2018-07-23 PROCEDURE — 85610 PROTHROMBIN TIME: CPT | Mod: QW

## 2018-07-23 NOTE — MR AVS SNAPSHOT
Oh Solares   7/23/2018 7:20 AM   Anticoagulation Therapy Visit    Description:  73 year old male   Provider:  NAYAN ANTI COAG   Department:  [facility] Nurse           INR as of 7/23/2018     Today's INR 1.7!      Anticoagulation Summary as of 7/23/2018     INR goal 2.0-3.0   Today's INR 1.7!   Full warfarin instructions 7/23: 10 mg; Otherwise 7.5 mg on Mon, Fri; 5 mg all other days   Next INR check 8/2/2018    Indications   Long-term (current) use of anticoagulants [Z79.01] [Z79.01]  Coagulation defect (HCC) [D68.9] [D68.9]         Your next Anticoagulation Clinic appointment(s)     Aug 02, 2018  3:00 PM CDT   Anticoagulation Visit with NAYAN ANTI COAG   [facility]    [address] [place] [id number] [phone]              Contact Numbers     [facility]  Please call [phone] or [phone]  to cancel and/or reschedule your appointment.   Please call [phone] with any problems or questions regarding your therapy          July 2018 Details    Sun Mon Tue Wed Thu Fri Sat     1               2               3               4               5               6               7                 8               9               10               11               12               13               14                 15               16               17               18               19               20               21                 22               23      10 mg   See details      24      5 mg         25      5 mg         26      5 mg         27      7.5 mg         28      5 mg           29      5 mg         30      7.5 mg         31      5 mg              Date Details   07/23 This INR check               How to take your warfarin dose     To take:  5 mg Take 1 of the 5 mg tablets.    To take:  7.5 mg Take 1.5 of the 5 mg tablets.    To take:  10 mg Take 2 of the 5 mg tablets.           August 2018 Details    [place]  Mon Tue Wed Thu Fri Sat        1      5 mg         2            3               4                 5               6               7               8               9               10               11                 12               13               14               15               16               17               18                 19               20               21               22               23               24               25                 26               27               28               29               30               31                 Date Details   No additional details    Date of next INR:  8/2/2018         How to take your warfarin dose     To take:  5 mg Take 1 of the 5 mg tablets.

## 2018-07-23 NOTE — PROGRESS NOTES
ANTICOAGULATION FOLLOW-UP CLINIC VISIT    Patient Name:  Oh Solares  Date:  7/23/2018  Contact Type:  Face to Face    SUBJECTIVE: Patient is here for routine INR.  He denies changes or problems.  He might have missed a partial dose of warfarin, but didn't think it would matter.  We discussed taking the dose when he notices the error in the future.  Plan extra warfarin today, 10 mg and then resume usual dose and recheck in 7-14 days.     Patient Findings     Positives Missed doses (might have missed 1/2 tablet)           OBJECTIVE    INR Protime   Date Value Ref Range Status   07/23/2018 1.7 (A) 0.86 - 1.14 Final       ASSESSMENT / PLAN  INR assessment SUB    Recheck INR In: 10 DAYS    INR Location Clinic      Anticoagulation Summary as of 7/23/2018     INR goal 2.0-3.0   Today's INR 1.7!   Warfarin maintenance plan 7.5 mg (5 mg x 1.5) on Mon, Fri; 5 mg (5 mg x 1) all other days   Full warfarin instructions 7/23: 10 mg; Otherwise 7.5 mg on Mon, Fri; 5 mg all other days   Weekly warfarin total 40 mg   Plan last modified Zulema Genao, RN (6/1/2018)   Next INR check 8/2/2018   Target end date Indefinite    Indications   Long-term (current) use of anticoagulants [Z79.01] [Z79.01]  Coagulation defect (HCC) [D68.9] [D68.9]         Anticoagulation Episode Summary     INR check location     Preferred lab     Send INR reminders to [facility]    Comments       Anticoagulation Care Providers     Provider Role Specialty Phone number    Brendon Kay MD  Internal Medicine [phone]            See the Encounter Report to view Anticoagulation Flowsheet and Dosing Calendar (Go to Encounters tab in chart review, and find the Anticoagulation Therapy Visit)    Dosage adjustment made based on physician directed care plan.    Zulema Genao, RN

## 2018-07-24 ENCOUNTER — DOCUMENTATION ONLY (OUTPATIENT)
Dept: LAB | Facility: CLINIC | Age: 73
End: 2018-07-24

## 2018-07-24 DIAGNOSIS — G89.4 CHRONIC PAIN SYNDROME: Primary | ICD-10-CM

## 2018-07-24 DIAGNOSIS — E78.5 HYPERLIPIDEMIA LDL GOAL <100: ICD-10-CM

## 2018-08-06 ENCOUNTER — ANTICOAGULATION THERAPY VISIT (OUTPATIENT)
Dept: NURSING | Facility: CLINIC | Age: 73
End: 2018-08-06
Payer: COMMERCIAL

## 2018-08-06 DIAGNOSIS — D68.9 COAGULATION DEFECT (H): ICD-10-CM

## 2018-08-06 DIAGNOSIS — Z79.01 LONG-TERM (CURRENT) USE OF ANTICOAGULANTS: ICD-10-CM

## 2018-08-06 LAB — INR POINT OF CARE: 2.7 (ref 0.86–1.14)

## 2018-08-06 PROCEDURE — 99207 ZZC NO CHARGE NURSE ONLY: CPT

## 2018-08-06 PROCEDURE — 36416 COLLJ CAPILLARY BLOOD SPEC: CPT

## 2018-08-06 PROCEDURE — 85610 PROTHROMBIN TIME: CPT | Mod: QW

## 2018-08-06 NOTE — PROGRESS NOTES
ANTICOAGULATION FOLLOW-UP CLINIC VISIT    Patient Name:  Oh Solares  Date:  8/6/2018  Contact Type:  Face to Face    SUBJECTIVE: Patient here after missing INR appointment last week.  He reports below regarding his omega supplement - out x 1 week.  His INR is back in range today so we will continue his usual warfarin dose and recheck.     Patient Findings     Positives Herbal/Supplements (patient is out of his liquid omega supplement.  on back order.  )           OBJECTIVE    INR Protime   Date Value Ref Range Status   08/06/2018 2.7 (A) 0.86 - 1.14 Final       ASSESSMENT / PLAN  INR assessment THER    Recheck INR In: 6 WEEKS    INR Location Clinic      Anticoagulation Summary as of 8/6/2018     INR goal 2.0-3.0   Today's INR 2.7   Warfarin maintenance plan 7.5 mg (5 mg x 1.5) on Mon, Fri; 5 mg (5 mg x 1) all other days   Full warfarin instructions 7.5 mg on Mon, Fri; 5 mg all other days   Weekly warfarin total 40 mg   No change documented Zulema Genao RN   Plan last modified Zulema Genao RN (6/1/2018)   Next INR check 9/17/2018   Target end date Indefinite    Indications   Long-term (current) use of anticoagulants [Z79.01] [Z79.01]  Coagulation defect (HCC) [D68.9] [D68.9]         Anticoagulation Episode Summary     INR check location     Preferred lab     Send INR reminders to [facility]    Comments       Anticoagulation Care Providers     Provider Role Specialty Phone number    Brendon Kay MD  Internal Medicine [phone]            See the Encounter Report to view Anticoagulation Flowsheet and Dosing Calendar (Go to Encounters tab in chart review, and find the Anticoagulation Therapy Visit)    Dosage adjustment made based on physician directed care plan.    Zulema Genao RN

## 2018-08-06 NOTE — MR AVS SNAPSHOT
Oh KELSEY Solares   8/6/2018 1:00 PM   Anticoagulation Therapy Visit    Description:  73 year old male   Provider:  NAYAN ANTI COAG   Department:  Cp Nurse           INR as of 8/6/2018     Today's INR 2.7      Anticoagulation Summary as of 8/6/2018     INR goal 2.0-3.0   Today's INR 2.7   Full warfarin instructions 7.5 mg on Mon, Fri; 5 mg all other days   Next INR check 9/17/2018    Indications   Long-term (current) use of anticoagulants [Z79.01] [Z79.01]  Coagulation defect (HCC) [D68.9] [D68.9]         Your next Anticoagulation Clinic appointment(s)     Sep 17, 2018  8:00 AM CDT   Anticoagulation Visit with NAAYN ANTI COAG   [facility] ([facility])    [address] [facility] [address] [phone]              Contact Numbers     [facility]  Please call [phone] or [phone]  to cancel and/or reschedule your appointment.   Please call [phone] with any problems or questions regarding your therapy          August 2018 Details    Sun Mon Tue Wed Thu Fri Sat        1               2               3               4                 5               6      7.5 mg   See details      7      5 mg         8      5 mg         9      5 mg         10      7.5 mg         11      5 mg           12      5 mg         13      7.5 mg         14      5 mg         15      5 mg         16      5 mg         17      7.5 mg         18      5 mg           19      5 mg         20      7.5 mg         21      5 mg         22      5 mg         23      5 mg         24      7.5 mg         25      5 mg           26      5 mg         27      7.5 mg         28      5 mg         29      5 mg         30      5 mg         31      7.5 mg           Date Details   08/06 This INR check               How to take your warfarin dose     To take:  5 mg Take 1 of the 5 mg tablets.    To take:  7.5 mg Take 1.5 of the 5 mg tablets.           September 2018 Details     Sun Mon Tue Wed Thu Fri Sat           1      5 mg           2      5 mg         3      7.5 mg         4      5 mg         5      5 mg         6      5 mg         7      7.5 mg         8      5 mg           9      5 mg         10      7.5 mg         11      5 mg         12      5 mg         13      5 mg         14      7.5 mg         15      5 mg           16      5 mg         17            18               19               20               21               22                 23               24               25               26               27               28               29                 30                      Date Details   No additional details    Date of next INR:  9/17/2018         How to take your warfarin dose     To take:  5 mg Take 1 of the 5 mg tablets.    To take:  7.5 mg Take 1.5 of the 5 mg tablets.

## 2018-08-13 DIAGNOSIS — F51.01 PRIMARY INSOMNIA: ICD-10-CM

## 2018-08-13 RX ORDER — ZOLPIDEM TARTRATE 10 MG/1
TABLET ORAL
Qty: 31 TABLET | Refills: 0 | Status: SHIPPED | OUTPATIENT
Start: 2018-08-13 | End: 2018-09-11

## 2018-08-13 NOTE — TELEPHONE ENCOUNTER
Requested Prescriptions   Pending Prescriptions Disp Refills     zolpidem (AMBIEN) 10 MG tablet [Pharmacy Med Name: ZOLPIDEM 10MG       TAB] 31 tablet 0     Sig: TAKE 1 TABLET BY MOUTH ONCE DAILY IN THE EVENING AS NEEDED    There is no refill protocol information for this order         Last Written Prescription Date:  7-16-18  Last Fill Quantity: 31,   # refills: 0  Last Office Visit: 8-23-17  Future Office visit:    Next 5 appointments (look out 90 days)     Aug 29, 2018 10:00 AM CDT   [facility]YSICAL [facility] Brendon Kay [facility]   [facility]    [address] [place] [id number] [phone]                   Routing refill request to provider for review/approval because:  Drug not on the [facility], [facility] or [facility] refill protocol or controlled substance

## 2018-08-20 DIAGNOSIS — G89.4 CHRONIC PAIN SYNDROME: ICD-10-CM

## 2018-08-20 DIAGNOSIS — E78.5 HYPERLIPIDEMIA LDL GOAL <100: ICD-10-CM

## 2018-08-20 LAB
ANION GAP SERPL CALCULATED.3IONS-SCNC: 5 MMOL/L (ref 3–14)
BUN SERPL-MCNC: 11 MG/DL (ref 7–30)
CALCIUM SERPL-MCNC: 8.6 MG/DL (ref 8.5–10.1)
CHLORIDE SERPL-SCNC: 108 MMOL/L (ref 94–109)
CHOLEST SERPL-MCNC: 198 MG/DL
CO2 SERPL-SCNC: 28 MMOL/L (ref 20–32)
CREAT SERPL-MCNC: 1.02 MG/DL (ref 0.66–1.25)
GFR SERPL CREATININE-BSD FRML MDRD: 72 ML/MIN/1.7M2
GLUCOSE SERPL-MCNC: 96 MG/DL (ref 70–99)
HDLC SERPL-MCNC: 79 MG/DL
LDLC SERPL CALC-MCNC: 99 MG/DL
NONHDLC SERPL-MCNC: 119 MG/DL
POTASSIUM SERPL-SCNC: 4.3 MMOL/L (ref 3.4–5.3)
SODIUM SERPL-SCNC: 141 MMOL/L (ref 133–144)
TRIGL SERPL-MCNC: 101 MG/DL

## 2018-08-20 PROCEDURE — 80048 BASIC METABOLIC PNL TOTAL CA: CPT | Performed by: INTERNAL MEDICINE

## 2018-08-20 PROCEDURE — 36415 COLL VENOUS BLD VENIPUNCTURE: CPT | Performed by: INTERNAL MEDICINE

## 2018-08-20 PROCEDURE — 99000 SPECIMEN HANDLING OFFICE-LAB: CPT | Performed by: INTERNAL MEDICINE

## 2018-08-20 PROCEDURE — 80061 LIPID PANEL: CPT | Performed by: INTERNAL MEDICINE

## 2018-08-20 PROCEDURE — 80307 DRUG TEST PRSMV CHEM ANLYZR: CPT | Mod: 90 | Performed by: INTERNAL MEDICINE

## 2018-08-25 LAB — COMPREHEN DRUG ANALYSIS UR: NORMAL

## 2018-08-29 ENCOUNTER — OFFICE VISIT (OUTPATIENT)
Dept: FAMILY MEDICINE | Facility: CLINIC | Age: 73
End: 2018-08-29
Payer: COMMERCIAL

## 2018-08-29 VITALS
BODY MASS INDEX: 23.82 KG/M2 | HEART RATE: 61 BPM | OXYGEN SATURATION: 100 % | HEIGHT: 65 IN | TEMPERATURE: 96.8 F | WEIGHT: 143 LBS | SYSTOLIC BLOOD PRESSURE: 128 MMHG | DIASTOLIC BLOOD PRESSURE: 64 MMHG

## 2018-08-29 DIAGNOSIS — D68.51 FACTOR V LEIDEN MUTATION (H): ICD-10-CM

## 2018-08-29 DIAGNOSIS — Z23 NEED FOR SHINGLES VACCINE: Primary | ICD-10-CM

## 2018-08-29 DIAGNOSIS — F41.9 ANXIETY: ICD-10-CM

## 2018-08-29 PROCEDURE — 99397 PER PM REEVAL EST PAT 65+ YR: CPT | Performed by: INTERNAL MEDICINE

## 2018-08-29 RX ORDER — ALPRAZOLAM 0.25 MG
0.25 TABLET ORAL 3 TIMES DAILY PRN
Qty: 30 TABLET | Refills: 0 | Status: SHIPPED | OUTPATIENT
Start: 2018-08-29 | End: 2018-10-11

## 2018-08-29 ASSESSMENT — ENCOUNTER SYMPTOMS
CONSTIPATION: 0
COUGH: 0
DIARRHEA: 0
HEMATURIA: 0
DIZZINESS: 0
EYE PAIN: 0
ABDOMINAL PAIN: 0
HEMATOCHEZIA: 0
CHILLS: 0

## 2018-08-29 ASSESSMENT — ACTIVITIES OF DAILY LIVING (ADL)
I_NEED_ASSISTANCE_FOR_THE_FOLLOWING_DAILY_ACTIVITIES:: NO ASSISTANCE IS NEEDED
CURRENT_FUNCTION: NO ASSISTANCE NEEDED

## 2018-08-29 ASSESSMENT — ANXIETY QUESTIONNAIRES
5. BEING SO RESTLESS THAT IT IS HARD TO SIT STILL: SEVERAL DAYS
3. WORRYING TOO MUCH ABOUT DIFFERENT THINGS: SEVERAL DAYS
6. BECOMING EASILY ANNOYED OR IRRITABLE: NOT AT ALL
GAD7 TOTAL SCORE: 4
1. FEELING NERVOUS, ANXIOUS, OR ON EDGE: SEVERAL DAYS
7. FEELING AFRAID AS IF SOMETHING AWFUL MIGHT HAPPEN: NOT AT ALL
2. NOT BEING ABLE TO STOP OR CONTROL WORRYING: SEVERAL DAYS
IF YOU CHECKED OFF ANY PROBLEMS ON THIS QUESTIONNAIRE, HOW DIFFICULT HAVE THESE PROBLEMS MADE IT FOR YOU TO DO YOUR WORK, TAKE CARE OF THINGS AT HOME, OR GET ALONG WITH OTHER PEOPLE: SOMEWHAT DIFFICULT

## 2018-08-29 ASSESSMENT — PAIN SCALES - GENERAL: PAINLEVEL: NO PAIN (0)

## 2018-08-29 ASSESSMENT — PATIENT HEALTH QUESTIONNAIRE - PHQ9: 5. POOR APPETITE OR OVEREATING: NOT AT ALL

## 2018-08-29 NOTE — MR AVS SNAPSHOT
After Visit Summary   8/29/2018    Oh Solares    MRN: [id number]           Patient Information     Date Of Birth          1945        Visit Information        Provider Department      8/29/2018 10:00 AM Brendon Kay MD [facility]        Today's Diagnoses     Need for shingles vaccine    -  1    Factor V Leiden mutation (H)        Anxiety          Care Instructions      Preventive Health Recommendations:       Male Ages 65 and over    Yearly exam:             See your health care provider every year in order to  o   Review health changes.   o   Discuss preventive care.    o   Review your medicines if your doctor has prescribed any.    Talk with your health care provider about whether you should have a test to screen for prostate cancer (PSA).    Every 3 years, have a diabetes test (fasting glucose). If you are at risk for diabetes, you should have this test more often.    Every 5 years, have a cholesterol test. Have this test more often if you are at risk for high cholesterol or heart disease.     Every 10 years, have a colonoscopy. Or, have a yearly FIT test (stool test). These exams will check for colon cancer.    Talk to with your health care provider about screening for Abdominal Aortic Aneurysm if you have a family history of AAA or have a history of smoking.  Shots:     Get a flu shot each year.     Get a tetanus shot every 10 years.     Talk to your doctor about your pneumonia vaccines. There are now two you should receive - Pneumovax (PPSV 23) and Prevnar (PCV 13).    Talk to your pharmacist about a shingles vaccine.     Talk to your doctor about the hepatitis B vaccine.  Nutrition:     Eat at least 5 servings of fruits and vegetables each day.     Eat whole-grain bread, whole-wheat pasta and brown rice instead of white grains and rice.     Get adequate Calcium and Vitamin D.   Lifestyle    Exercise for at least 150 minutes a week (30 minutes a day, 5 days  a week). This will help you control your weight and prevent disease.     Limit alcohol to one drink per day.     No smoking.     Wear sunscreen to prevent skin cancer.     See your dentist every six months for an exam and cleaning.     See your eye doctor every 1 to 2 years to screen for conditions such as glaucoma, macular degeneration and cataracts.          Follow-ups after your visit        Additional Services     INR CLINIC REFERRAL       Your provider has referred you to INR Services.    Please be aware that coverage of these services is subject to the terms and limitations of your health insurance plan.  Call member services at your health plan with any benefit or coverage questions.    Indication for Anticoagulation: DVT (recurrent)  If nonstandard INR is desired, indicate goal range and explanation: 2-3  Expected Duration of Therapy: Lifetime                  Your next 10 appointments already scheduled     Sep 17, 2018  8:00 AM CDT   Anticoagulation Visit with CP ANTI COAG   [facility]    [address] [place] [id number]   [phone]              Who to contact     If you have questions or need follow up information about today's clinic visit or your schedule please contact [facility] directly at [phone].  Normal or non-critical lab and imaging results will be communicated to you by MyChart, letter or phone within 4 business days after the clinic has received the results. If you do not hear from us within 7 days, please contact the clinic through MyChart or phone. If you have a critical or abnormal lab result, we will notify you by phone as soon as possible.  Submit refill requests through [employer] or call your pharmacy and they will forward the refill request to us. Please allow 3 business days for your refill to be completed.          Additional Information About Your Visit        Care  "EveryWhere ID     This is your Care EveryWhere ID. This could be used by other organizations to access your [place] medical records  [id number]        Your Vitals Were     Pulse Temperature Height Pulse Oximetry BMI (Body Mass Index)       61 96.8  F (36  C) (Oral) 5' 4.96\" (1.65 m) 100% 23.83 kg/m2        Blood Pressure from Last 3 Encounters:   08/29/18 128/64   08/23/17 133/71   08/02/16 100/59    Weight from Last 3 Encounters:   08/29/18 143 lb (64.9 kg)   08/23/17 142 lb (64.4 kg)   08/02/16 140 lb (63.5 kg)              We Performed the Following     INR CLINIC REFERRAL          Today's Medication Changes          These changes are accurate as of 8/29/18  6:49 PM.  If you have any questions, ask your nurse or doctor.               Start taking these medicines.        Dose/Directions    ALPRAZolam 0.25 MG tablet   Commonly known as:  XANAX   Used for:  Anxiety   Started by:  Brendon Kay MD        Dose:  0.25 mg   Take 1 tablet (0.25 mg) by mouth 3 times daily as needed for anxiety   Quantity:  30 tablet   Refills:  0       zoster vaccine recombinant adjuvanted injection   Commonly known as:  SHINGRIX   Used for:  Need for shingles vaccine, Factor V Leiden mutation (H)   Started by:  Brendon Kay MD        Dose:  1 each   Inject 0.5 mLs into the muscle once for 1 dose   Quantity:  0.5 mL   Refills:  0            Where to get your medicines      Some of these will need a paper prescription and others can be bought over the counter.  Ask your nurse if you have questions.     Bring a paper prescription for each of these medications     ALPRAZolam 0.25 MG tablet    zoster vaccine recombinant adjuvanted injection                Primary Care Provider Office Phone # Fax #    Brendon Kay [phone]       [address] [place] [id number]        Equal Access to Services     EDUARDO VIEIRA AH: Dixon winn Soheather, waaxda luqadaha, qaybta kaalmada adealexandreayada, jarocho " kojo salazaralexandrea sheppard'aan ah. So [facility] [phone].    ATENCIÓN: Si sean gerard, tiene a lofton disposición servicios gratuitos de asistencia lingüística. Mikey al [phone].    We comply with applicable federal civil rights laws and [place] laws. We do not discriminate on the basis of race, color, national origin, age, disability, sex, sexual orientation, or gender identity.            Thank you!     Thank you for choosing [facility]  for your care. Our goal is always to provide you with excellent care. Hearing back from our patients is one way we can continue to improve our services. Please take a few minutes to complete the written survey that you may receive in the mail after your visit with us. Thank you!             Your Updated Medication List - Protect others around you: Learn how to safely use, store and throw away your medicines at www.disposemymeds.org.          This list is accurate as of 8/29/18  6:49 PM.  Always use your most recent med list.                   Brand Name Dispense Instructions for use Diagnosis    ALPRAZolam 0.25 MG tablet    XANAX    30 tablet    Take 1 tablet (0.25 mg) by mouth 3 times daily as needed for anxiety    Anxiety       [employer] SAW PALMETTO 160 MG Caps   Generic drug:  saw palmetto     1 capsule    Take 3 capsules by mouth daily.        HYDROcodone-acetaminophen 5-325 MG per tablet    NORCO    30 tablet    Take 1 tablet by mouth every 6 hours as needed for pain    Chronic bilateral low back pain without sciatica       TYLENOL 325 MG tablet   Generic drug:  acetaminophen      as needed        VITAMIN DAILY PO      Take 500 mg by mouth daily.    Hematoma       warfarin 5 MG tablet    COUMADIN    100 tablet    Take 1 tablet (5 mg) by mouth daily    Long term current use of anticoagulant therapy       zolpidem 10 MG tablet    AMBIEN    31 tablet    TAKE 1 TABLET BY MOUTH ONCE DAILY IN THE EVENING AS NEEDED    Primary insomnia       zoster vaccine  recombinant adjuvanted injection    SHINGRIX    0.5 mL    Inject 0.5 mLs into the muscle once for 1 dose    Need for shingles vaccine, Factor V Leiden mutation (H)

## 2018-08-29 NOTE — PROGRESS NOTES
SUBJECTIVE:   Oh Solares is a 73 year old male who presents for Preventive Visit.    1. Aching and stiffness in the   Are you in the first 12 months of your Medicare coverage?  No    Physical   Annual:     Getting at least 3 servings of Calcium per day:  Yes    Bi-annual eye exam:  Yes    Dental care twice a year:  Yes    Sleep apnea or symptoms of sleep apnea:  None    Diet:  Regular (no restrictions)    Taking medications regularly:  Yes    Medication side effects:  None    Additional concerns today:  No    Ability to successfully perform activities of daily living: no assistance needed    Home Safety:  No safety concerns identified    Hearing Impairment: difficulty following a conversation in a noisy restaurant or crowded room      Ability to successfully perform activities of daily living: Yes, no assistance needed  Home safety:  none identified   Hearing impairment: none      Fall risk:       COGNITIVE SCREEN  1) Repeat 3 items (Leader, Season, Table)    2) Clock draw: NORMAL  3) 3 item recall: Recalls 3 objects  Results: 3 items recalled: COGNITIVE IMPAIRMENT LESS LIKELY    Mini-CogTM Copyright ELAINE Mike. Licensed by the author for use in [facility]; reprinted with permission (eamon@[facility]). All rights reserved.        Reviewed and updated as needed this visit by clinical staff  Tobacco  Allergies  Meds  Med Hx  Surg Hx  Fam Hx  Soc Hx        Reviewed and updated as needed this visit by Provider        Social History   Substance Use Topics     Smoking status: Former Smoker     Quit date: 4/15/1984     Smokeless tobacco: Never Used     Alcohol use Yes      Comment: 1 beer every  day       Alcohol Use 8/29/2018   If you drink alcohol do you typically have greater than 3 drinks per day OR greater than 7 drinks per week? No   No flowsheet data found.            Today's PHQ-2 Score:   PHQ-2 ( 1999 Pfizer) 8/29/2018   Q1: Little interest or pleasure in doing things 0   Q2: Feeling down,  depressed or hopeless 0   PHQ-2 Score 0   Q1: Little interest or pleasure in doing things Not at all   Q2: Feeling down, depressed or hopeless Not at all   PHQ-2 Score 0       Do you feel safe in your environment - Yes    Do you have a Health Care Directive?: No: Advance care planning was reviewed with patient; patient declined at this time.    Current providers sharing in care for this patient include:   Patient Care Team:  Brendon Kay MD as PCP - General    The following health maintenance items are reviewed in Epic and correct as of today:  Health Maintenance   Topic Date Due     OP ANNUAL INR REFERRAL  07/23/2016     FALL RISK ASSESSMENT  08/02/2017     YIN QUESTIONNAIRE 1 YEAR  08/23/2018     PHQ-9 Q1YR  08/23/2018     INFLUENZA VACCINE (1) 09/01/2018     URINE DRUG SCREEN Q1 YR  08/20/2019     ADVANCE DIRECTIVE PLANNING Q5 YRS  08/23/2022     LIPID SCREEN Q5 YR MALE (SYSTEM ASSIGNED)  08/20/2023     TETANUS IMMUNIZATION (SYSTEM ASSIGNED)  03/25/2024     COLON CANCER SCREEN (SYSTEM ASSIGNED)  09/03/2024     PNEUMOCOCCAL  Completed     AORTIC ANEURYSM SCREENING (SYSTEM ASSIGNED)  Completed     HEPATITIS C SCREENING  Completed     Labs reviewed in EPIC  BP Readings from Last 3 Encounters:   08/29/18 128/64   08/23/17 133/71   08/02/16 100/59    Wt Readings from Last 3 Encounters:   08/29/18 143 lb (64.9 kg)   08/23/17 142 lb (64.4 kg)   08/02/16 140 lb (63.5 kg)                  Patient Active Problem List   Diagnosis     Factor V Leiden mutation (H)     GI bleed     ED (erectile dysfunction)     CARDIOVASCULAR SCREENING; LDL GOAL LESS THAN 160     Advanced directives, counseling/discussion     Sleep stage dysfunction     Anxiety     Dupuytren's contracture - left     Chronic pain     Long-term (current) use of anticoagulants [Z79.01]     Coagulation defect (HCC) [D68.9]     Chronic pain syndrome     Past Surgical History:   Procedure Laterality Date     C VENOUS THROMBOSIS STUDY       CLOSED RX ELBOW  DISLOCATION  96    left     COLECTOMY  2002     COLONOSCOPY       COLONOSCOPY WITH CO2 INSUFFLATION N/A 9/3/2014    Procedure: COLONOSCOPY WITH CO2 INSUFFLATION;  Surgeon: West Jerez MD;  Location: [facility] OR     SMALL BOWEL RESECTION  01/02    partial     VASECTOMY         Social History   Substance Use Topics     Smoking status: Former Smoker     Quit date: 4/15/1984     Smokeless tobacco: Never Used     Alcohol use Yes      Comment: 1 beer every  day     History reviewed. No pertinent family history.      Current Outpatient Prescriptions   Medication Sig Dispense Refill     ALPRAZolam (XANAX) 0.25 MG tablet Take 1 tablet (0.25 mg) by mouth 3 times daily as needed for anxiety 30 tablet 0     HYDROcodone-acetaminophen (NORCO) 5-325 MG per tablet Take 1 tablet by mouth every 6 hours as needed for pain 30 tablet 0     Pediatric Multiple Vit-Vit C (VITAMIN DAILY PO) Take 500 mg by mouth daily.       saw palmetto ([employer] SAW PALMETTO) 160 MG CAPS Take 3 capsules by mouth daily. 1 capsule 12     warfarin (COUMADIN) 5 MG tablet Take 1 tablet (5 mg) by mouth daily 100 tablet 3     zolpidem (AMBIEN) 10 MG tablet TAKE 1 TABLET BY MOUTH ONCE DAILY IN THE EVENING AS NEEDED 31 tablet 0     TYLENOL 325 MG OR TABS as needed       Allergies   Allergen Reactions     Penicillins Rash           Review of Systems   Constitutional: Negative for chills.   HENT: Negative for congestion and ear pain.    Eyes: Negative for pain.   Respiratory: Negative for cough.    Cardiovascular: Negative for chest pain.   Gastrointestinal: Negative for abdominal pain, constipation, diarrhea and hematochezia.   Genitourinary: Negative for hematuria.   Neurological: Negative for dizziness.     Constitutional, HEENT, cardiovascular, pulmonary, gi and gu systems are negative, except as otherwise noted.    OBJECTIVE:   /72 (BP Location: Left arm, Patient Position: Chair, Cuff Size: Adult Regular)  Pulse 61  Temp 96.8  F (36  C) (Oral)  Ht 5'  "4.96\" (1.65 m)  Wt 143 lb (64.9 kg)  SpO2 100%  BMI 23.83 kg/m2 Estimated body mass index is 23.83 kg/(m^2) as calculated from the following:    Height as of this encounter: 5' 4.96\" (1.65 m).    Weight as of this encounter: 143 lb (64.9 kg).  Physical Exam  GENERAL: healthy, alert and no distress  EYES: Eyes grossly normal to inspection, PERRL and conjunctivae and sclerae normal  HENT: ear canals and TM's normal, nose and mouth without ulcers or lesions  NECK: no adenopathy, no asymmetry, masses, or scars and thyroid normal to palpation  RESP: lungs clear to auscultation - no rales, rhonchi or wheezes  CV: regular rate and rhythm, normal S1 S2, no S3 or S4, no murmur, click or rub, no peripheral edema and peripheral pulses strong  ABDOMEN: soft, nontender, no hepatosplenomegaly, no masses and bowel sounds normal  MS: no gross musculoskeletal defects noted, no edema  SKIN: no suspicious lesions or rashes  NEURO: Normal strength and tone, mentation intact and speech normal  BACK: no CVA tenderness, no paralumbar tenderness  PSYCH: mentation appears normal, affect normal/bright  LYMPH: no cervical, supraclavicular, axillary, or inguinal adenopathy    Diagnostic Test Results:  Results for orders placed or performed in visit on 08/20/18   **Basic metabolic panel FUTURE anytime   Result Value Ref Range    Sodium 141 133 - 144 mmol/L    Potassium 4.3 3.4 - 5.3 mmol/L    Chloride 108 94 - 109 mmol/L    Carbon Dioxide 28 20 - 32 mmol/L    Anion Gap 5 3 - 14 mmol/L    Glucose 96 70 - 99 mg/dL    Urea Nitrogen 11 7 - 30 mg/dL    Creatinine 1.02 0.66 - 1.25 mg/dL    GFR Estimate 72 >60 mL/min/1.7m2    GFR Estimate If Black 87 >60 mL/min/1.7m2    Calcium 8.6 8.5 - 10.1 mg/dL   Drug  Screen Comprehensive, Urine w/o Reported Meds (Pain Care Package)   Result Value Ref Range    Comprehen Drug Analysis UR FINAL    Lipid panel reflex to direct LDL Fasting   Result Value Ref Range    Cholesterol 198 <200 mg/dL    Triglycerides 101 " "<150 mg/dL    HDL Cholesterol 79 >39 mg/dL    LDL Cholesterol Calculated 99 <100 mg/dL    Non HDL Cholesterol 119 <130 mg/dL       ASSESSMENT / PLAN:       ICD-10-CM    1. Need for shingles vaccine Z23 INR CLINIC REFERRAL     zoster vaccine recombinant adjuvanted (SHINGRIX) injection   2. Factor V Leiden mutation (H) D68.51 INR CLINIC REFERRAL     zoster vaccine recombinant adjuvanted (SHINGRIX) injection   3. Anxiety F41.9 ALPRAZolam (XANAX) 0.25 MG tablet       End of Life Planning:  Patient currently has an advanced directive: Yes.  Practitioner is supportive of decision.    COUNSELING:  Reviewed preventive health counseling, as reflected in patient instructions       Regular exercise       Healthy diet/nutrition       Vision screening       Hearing screening       Dental care    BP Readings from Last 1 Encounters:   08/29/18 144/72     Estimated body mass index is 23.83 kg/(m^2) as calculated from the following:    Height as of this encounter: 5' 4.96\" (1.65 m).    Weight as of this encounter: 143 lb (64.9 kg).        ICD-10-CM    1. Need for shingles vaccine Z23 INR CLINIC REFERRAL     zoster vaccine recombinant adjuvanted (SHINGRIX) injection   2. Factor V Leiden mutation (H) D68.51 INR CLINIC REFERRAL     zoster vaccine recombinant adjuvanted (SHINGRIX) injection   3. Anxiety F41.9 ALPRAZolam (XANAX) 0.25 MG tablet          reports that he quit smoking about 34 years ago. He has never used smokeless tobacco.      Appropriate preventive services were discussed with this patient, including applicable screening as appropriate for cardiovascular disease, diabetes, osteopenia/osteoporosis, and glaucoma.  As appropriate for age/gender, discussed screening for colorectal cancer, prostate cancer, breast cancer, and cervical cancer. Checklist reviewing preventive services available has been given to the patient.    Reviewed patients plan of care and provided an AVS. The Basic Care Plan (routine screening as documented " in Health Maintenance) for Oh meets the Care Plan requirement. This Care Plan has been established and reviewed with the Patient.    Counseling Resources:  ATP IV Guidelines  Pooled Cohorts Equation Calculator  Breast Cancer Risk Calculator  FRAX Risk Assessment  ICSI Preventive Guidelines  Dietary Guidelines for Americans, 2010  USDA's MyPlate  ASA Prophylaxis  Lung CA Screening    Brendon Kay MD  [facility]  Answers for HPI/ROS submitted by the patient on 8/29/2018   PHQ-2 Score: 0

## 2018-08-30 ASSESSMENT — PATIENT HEALTH QUESTIONNAIRE - PHQ9: SUM OF ALL RESPONSES TO PHQ QUESTIONS 1-9: 3

## 2018-08-30 ASSESSMENT — ANXIETY QUESTIONNAIRES: GAD7 TOTAL SCORE: 4

## 2018-09-03 ENCOUNTER — TELEPHONE (OUTPATIENT)
Dept: FAMILY MEDICINE | Facility: CLINIC | Age: 73
End: 2018-09-03

## 2018-09-03 DIAGNOSIS — G89.29 CHRONIC BILATERAL LOW BACK PAIN WITHOUT SCIATICA: ICD-10-CM

## 2018-09-03 DIAGNOSIS — M54.50 CHRONIC BILATERAL LOW BACK PAIN WITHOUT SCIATICA: ICD-10-CM

## 2018-09-03 NOTE — TELEPHONE ENCOUNTER
"Clinic Action Needed:YES please check with MD and call patient in am 9/4 when RX is ready for  at [phone].  Reason for Call:\"I need a hard copy of   HYDROcodone-acetaminophen (NORCO) 5-325 MG per tablet 30 tablet 0 7/2/2018  No   Sig - Route: Take 1 tablet by mouth every 6 hours as needed for pain - Oral       Patient Recommendations/Teaching:I will route request to PCP  Routed to:PCP  Gill Hammer RN [facility]          "

## 2018-09-04 NOTE — TELEPHONE ENCOUNTER
Requested Prescriptions   Pending Prescriptions Disp Refills     HYDROcodone-acetaminophen (NORCO) 5-325 MG per tablet 30 tablet 0     Sig: Take 1 tablet by mouth every 6 hours as needed for pain    There is no refill protocol information for this order        Last Written Prescription Date:  7/2/2018  Last Fill Quantity: 30,  # refills: 0   Last office visit: 8/29/2018 with prescribing provider:  Rahul baptiste   Future Office Visit:    Routing refill request to provider for review/approval because:  Drug not on the [facility] refill protocol       Karlee Mcclendon RN  [facility]

## 2018-09-05 RX ORDER — HYDROCODONE BITARTRATE AND ACETAMINOPHEN 5; 325 MG/1; MG/1
1 TABLET ORAL EVERY 6 HOURS PRN
Qty: 30 TABLET | Refills: 0 | Status: SHIPPED | OUTPATIENT
Start: 2018-09-05 | End: 2018-11-12

## 2018-09-11 DIAGNOSIS — F51.01 PRIMARY INSOMNIA: ICD-10-CM

## 2018-09-11 RX ORDER — ZOLPIDEM TARTRATE 10 MG/1
TABLET ORAL
Qty: 31 TABLET | Refills: 0 | Status: SHIPPED | OUTPATIENT
Start: 2018-09-11 | End: 2018-10-11

## 2018-09-11 NOTE — TELEPHONE ENCOUNTER
Requested Prescriptions   Pending Prescriptions Disp Refills     zolpidem (AMBIEN) 10 MG tablet [Pharmacy Med Name: ZOLPIDEM 10MG       TAB] 31 tablet 0     Sig: TAKE 1 TABLET BY MOUTH ONCE DAILY IN THE EVENING AS NEEDED    There is no refill protocol information for this order        Last Written Prescription Date:  8/29/2018  Last Fill Quantity: 30,  # refills: 0   Last office visit: 8/29/2018 with prescribing provider:  Rahul   Future Office Visit:    Routing refill request to provider for review/approval because:  Drug not on the [facility] refill protocol       Karlee Mcclendon RN  [facility]

## 2018-09-17 ENCOUNTER — ANTICOAGULATION THERAPY VISIT (OUTPATIENT)
Dept: NURSING | Facility: CLINIC | Age: 73
End: 2018-09-17
Payer: COMMERCIAL

## 2018-09-17 DIAGNOSIS — D68.9 COAGULATION DEFECT (H): ICD-10-CM

## 2018-09-17 DIAGNOSIS — Z79.01 LONG-TERM (CURRENT) USE OF ANTICOAGULANTS: ICD-10-CM

## 2018-09-17 LAB — INR POINT OF CARE: 2.6 (ref 0.86–1.14)

## 2018-09-17 PROCEDURE — 36416 COLLJ CAPILLARY BLOOD SPEC: CPT

## 2018-09-17 PROCEDURE — 85610 PROTHROMBIN TIME: CPT | Mod: QW

## 2018-09-17 PROCEDURE — 99207 ZZC NO CHARGE NURSE ONLY: CPT

## 2018-09-17 NOTE — MR AVS SNAPSHOT
Oh Solares   9/17/2018 8:00 AM   Anticoagulation Therapy Visit    Description:  73 year old male   Provider:  NAYAN ANTI COAG   Department:  C[facility] Nurse           INR as of 9/17/2018     Today's INR 2.6      Anticoagulation Summary as of 9/17/2018     INR goal 2.0-3.0   Today's INR 2.6   Full warfarin instructions 7.5 mg on Mon, Fri; 5 mg all other days   Next INR check 10/29/2018    Indications   Long-term (current) use of anticoagulants [Z79.01] [Z79.01]  Coagulation defect (HCC) [D68.9] [D68.9]         Your next Anticoagulation Clinic appointment(s)     Oct 29, 2018  8:00 AM CDT   Anticoagulation Visit with NAYAN ANTI COAG   [facility] ([facility]    [address] [place] [id number] [phone]              Contact Numbers     [facility]  Please call [phone] or [phone]  to cancel and/or reschedule your appointment.   Please call [phone] with any problems or questions regarding your therapy          September 2018 Details    Sun Mon Tue Wed Thu Fri Sat           1                 2               3               4               5               6               7               8                 9               10               11               12               13               14               15                 16               17      7.5 mg   See details      18      5 mg         19      5 mg         20      5 mg         21      7.5 mg         22      5 mg           23      5 mg         24      7.5 mg         25      5 mg         26      5 mg         27      5 mg         28      7.5 mg         29      5 mg           30      5 mg                Date Details   09/17 This INR check               How to take your warfarin dose     To take:  5 mg Take 1 of the 5 mg tablets.    To take:  7.5 mg Take 1.5 of the 5 mg tablets.           October 2018 Details    Sun Mon Tue Wed Thu Fri Sat      1      7.5 mg          2      5 mg         3      5 mg         4      5 mg         5      7.5 mg         6      5 mg           7      5 mg         8      7.5 mg         9      5 mg         10      5 mg         11      5 mg         12      7.5 mg         13      5 mg           14      5 mg         15      7.5 mg         16      5 mg         17      5 mg         18      5 mg         19      7.5 mg         20      5 mg           21      5 mg         22      7.5 mg         23      5 mg         24      5 mg         25      5 mg         26      7.5 mg         27      5 mg           28      5 mg         29            30               31                   Date Details   No additional details    Date of next INR:  10/29/2018         How to take your warfarin dose     To take:  5 mg Take 1 of the 5 mg tablets.    To take:  7.5 mg Take 1.5 of the 5 mg tablets.

## 2018-09-17 NOTE — PROGRESS NOTES
ANTICOAGULATION FOLLOW-UP CLINIC VISIT    Patient Name:  Oh Solares  Date:  9/17/2018  Contact Type:  Face to Face    SUBJECTIVE:     Patient Findings     Positives No Problem Findings           OBJECTIVE    INR Protime   Date Value Ref Range Status   09/17/2018 2.6 (A) 0.86 - 1.14 Final       ASSESSMENT / PLAN  INR assessment THER    Recheck INR In: 6 WEEKS    INR Location Clinic      Anticoagulation Summary as of 9/17/2018     INR goal 2.0-3.0   Today's INR 2.6   Warfarin maintenance plan 7.5 mg (5 mg x 1.5) on Mon, Fri; 5 mg (5 mg x 1) all other days   Full warfarin instructions 7.5 mg on Mon, Fri; 5 mg all other days   Weekly warfarin total 40 mg   No change documented Zulema Genao RN   Plan last modified Zulema Genao RN (6/1/2018)   Next INR check 10/29/2018   Target end date Indefinite    Indications   Long-term (current) use of anticoagulants [Z79.01] [Z79.01]  Coagulation defect (HCC) [D68.9] [D68.9]         Anticoagulation Episode Summary     INR check location     Preferred lab     Send INR reminders to [facility]    Comments       Anticoagulation Care Providers     Provider Role Specialty Phone number    Brendon Kay MD  Internal Medicine [phone]            See the Encounter Report to view Anticoagulation Flowsheet and Dosing Calendar (Go to Encounters tab in chart review, and find the Anticoagulation Therapy Visit)    Dosage adjustment made based on physician directed care plan.    Zulema Genao RN
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
## 2018-10-11 DIAGNOSIS — F51.01 PRIMARY INSOMNIA: ICD-10-CM

## 2018-10-11 DIAGNOSIS — F41.9 ANXIETY: ICD-10-CM

## 2018-10-11 NOTE — TELEPHONE ENCOUNTER
Requested Prescriptions   Pending Prescriptions Disp Refills     zolpidem (AMBIEN) 10 MG tablet [Pharmacy Med Name: ZOLPIDEM 10MG       TAB] 31 tablet 0     Sig: TAKE 1 TABLET BY MOUTH ONCE DAILY IN THE EVENING AS NEEDED    There is no refill protocol information for this order         Last Written Prescription Date:  9/11818  Last Fill Quantity: 31,   # refills: 0  Last Office Visit: 8/29/187  Future Office visit:       Routing refill request to provider for review/approval because:  Drug not on the [facility] or  [facility] refill protocol or controlled substance       ALPRAZolam (XANAX) 0.25 MG tablet [Pharmacy Med Name: ALPRAZolam 0.25MG   TAB] 30 tablet 0     Sig: TAKE 1 TABLET BY MOUTH THREE TIMES DAILY AS NEEDED FOR ANXIETY    There is no refill protocol information for this order         Last Written Prescription Date:  8/29/18  Last Fill Quantity: 30,   # refills: 0  Last Office Visit: 8/29/18  Future Office visit:       Routing refill request to provider for review/approval because:  Drug not on the [facility] or  [facility] refill protocol or controlled substance

## 2018-10-12 RX ORDER — ALPRAZOLAM 0.25 MG
TABLET ORAL
Qty: 30 TABLET | Refills: 0 | Status: SHIPPED | OUTPATIENT
Start: 2018-10-12 | End: 2020-03-27

## 2018-10-12 RX ORDER — ZOLPIDEM TARTRATE 10 MG/1
TABLET ORAL
Qty: 31 TABLET | Refills: 0 | Status: SHIPPED | OUTPATIENT
Start: 2018-10-12 | End: 2018-11-12

## 2018-10-12 NOTE — TELEPHONE ENCOUNTER
Prescription for Xanax and Ambien was faxed [facility] [place]

## 2018-10-29 ENCOUNTER — ANTICOAGULATION THERAPY VISIT (OUTPATIENT)
Dept: NURSING | Facility: CLINIC | Age: 73
End: 2018-10-29
Payer: COMMERCIAL

## 2018-10-29 DIAGNOSIS — D68.9 COAGULATION DEFECT (H): ICD-10-CM

## 2018-10-29 LAB — INR POINT OF CARE: 1.7 (ref 0.86–1.14)

## 2018-10-29 PROCEDURE — 99207 ZZC NO CHARGE NURSE ONLY: CPT

## 2018-10-29 PROCEDURE — 85610 PROTHROMBIN TIME: CPT | Mod: QW

## 2018-10-29 PROCEDURE — 36416 COLLJ CAPILLARY BLOOD SPEC: CPT

## 2018-10-29 NOTE — MR AVS SNAPSHOT
Oh Solares   10/29/2018 8:00 AM   Anticoagulation Therapy Visit    Description:  73 year old male   Provider:  NAYAN ANTI COAG   Department:  Cp Nurse           INR as of 10/29/2018     Today's INR 1.7!      Anticoagulation Summary as of 10/29/2018     INR goal 2.0-3.0   Today's INR 1.7!   Full warfarin instructions 10/29: 10 mg; Otherwise 7.5 mg on Mon, Fri; 5 mg all other days   Next INR check 11/8/2018    Indications   Coagulation defect (HCC) [D68.9] [D68.9]         Your next Anticoagulation Clinic appointment(s)     Nov 08, 2018  3:00 PM CST   Anticoagulation Visit with CP ANTI COAG   [facility]    [address] [facility] [address] [phone]              Contact Numbers     [facility]  Please call [phone] or [phone]  to cancel and/or reschedule your appointment.   Please call [phone] with any problems or questions regarding your therapy          October 2018 Details    Sun Mon Tue Wed Thu Fri Sat      1               2               3               4               5               6                 7               8               9               10               11               12               13                 14               15               16               17               18               19               20                 21               22               23               24               25               26               27                 28               29      10 mg   See details      30      5 mg         31      5 mg             Date Details   10/29 This INR check               How to take your warfarin dose     To take:  5 mg Take 1 of the 5 mg tablets.    To take:  10 mg Take 2 of the 5 mg tablets.           November 2018 Details    Sun Mon Tue Wed Thu Fri Sat         1      5 mg         2      7.5 mg         3      5 mg           4      5 mg         5      7.5 mg          6      5 mg         7      5 mg         8            9               10                 11               12               13               14               15               16               17                 18               19               20               21               22               23               24                 25               26               27               28               29               30                 Date Details   No additional details    Date of next INR:  11/8/2018         How to take your warfarin dose     To take:  5 mg Take 1 of the 5 mg tablets.    To take:  7.5 mg Take 1.5 of the 5 mg tablets.

## 2018-10-29 NOTE — PROGRESS NOTES
ANTICOAGULATION FOLLOW-UP CLINIC VISIT    Patient Name:  Oh Solares  Date:  10/29/2018  Contact Type:  Face to Face    SUBJECTIVE: plan extra warfarin today and then resume schedule.  Recheck in 7 - 10 days.     Patient Findings     Positives No Problem Findings           OBJECTIVE    INR Protime   Date Value Ref Range Status   10/29/2018 1.7 (A) 0.86 - 1.14 Final       ASSESSMENT / PLAN  INR assessment SUB    Recheck INR In: 10 DAYS    INR Location Clinic      Anticoagulation Summary as of 10/29/2018     INR goal 2.0-3.0   Today's INR 1.7!   Warfarin maintenance plan 7.5 mg (5 mg x 1.5) on Mon, Fri; 5 mg (5 mg x 1) all other days   Full warfarin instructions 10/29: 10 mg; Otherwise 7.5 mg on Mon, Fri; 5 mg all other days   Weekly warfarin total 40 mg   Plan last modified Zulema Genao RN (6/1/2018)   Next INR check 11/8/2018   Target end date Indefinite    Indications   Coagulation defect (HCC) [D68.9] [D68.9]         Anticoagulation Episode Summary     INR check location     Preferred lab     Send INR reminders to [facility] CLINIC    Comments       Anticoagulation Care Providers     Provider Role Specialty Phone number    Brendon Kay MD  Internal Medicine [phone]            See the Encounter Report to view Anticoagulation Flowsheet and Dosing Calendar (Go to Encounters tab in chart review, and find the Anticoagulation Therapy Visit)    Dosage adjustment made based on physician directed care plan.    Zulema Genao RN

## 2018-11-08 ENCOUNTER — ANTICOAGULATION THERAPY VISIT (OUTPATIENT)
Dept: NURSING | Facility: CLINIC | Age: 73
End: 2018-11-08
Payer: COMMERCIAL

## 2018-11-08 DIAGNOSIS — D68.9 COAGULATION DEFECT (H): ICD-10-CM

## 2018-11-08 LAB — INR POINT OF CARE: 2.6 (ref 0.86–1.14)

## 2018-11-08 PROCEDURE — 85610 PROTHROMBIN TIME: CPT | Mod: QW

## 2018-11-08 PROCEDURE — 99207 ZZC NO CHARGE NURSE ONLY: CPT

## 2018-11-08 PROCEDURE — 36416 COLLJ CAPILLARY BLOOD SPEC: CPT

## 2018-11-08 NOTE — MR AVS SNAPSHOT
Oh Solares   11/8/2018 3:00 PM   Anticoagulation Therapy Visit    Description:  73 year old male   Provider:  CP ANTI COAG   Department:  Cp Nurse           INR as of 11/8/2018     Today's INR 2.6      Anticoagulation Summary as of 11/8/2018     INR goal 2.0-3.0   Today's INR 2.6   Full warfarin instructions 7.5 mg on Mon, Fri; 5 mg all other days   Next INR check 12/20/2018    Indications   Coagulation defect (HCC) [D68.9] [D68.9]         Your next Anticoagulation Clinic appointment(s)     Dec 20, 2018  9:40 AM CST   Anticoagulation Visit with CP ANTI COAG   [facility]    [address] [facility] [address] [phone]              Contact Numbers     [facility]  Please call [phone] or [phone]  to cancel and/or reschedule your appointment.   Please call [phone] with any problems or questions regarding your therapy          November 2018 Details    Sun Mon Tue Wed Thu Fri Sat         1               2               3                 4               5               6               7               8      5 mg   See details      9      7.5 mg         10      5 mg           11      5 mg         12      7.5 mg         13      5 mg         14      5 mg         15      5 mg         16      7.5 mg         17      5 mg           18      5 mg         19      7.5 mg         20      5 mg         21      5 mg         22      5 mg         23      7.5 mg         24      5 mg           25      5 mg         26      7.5 mg         27      5 mg         28      5 mg         29      5 mg         30      7.5 mg           Date Details   11/08 This INR check               How to take your warfarin dose     To take:  5 mg Take 1 of the 5 mg tablets.    To take:  7.5 mg Take 1.5 of the 5 mg tablets.           December 2018 Details    Sun Mon Tue Wed Thu Fri Sat           1      5 mg           2      5 mg         3       7.5 mg         4      5 mg         5      5 mg         6      5 mg         7      7.5 mg         8      5 mg           9      5 mg         10      7.5 mg         11      5 mg         12      5 mg         13      5 mg         14      7.5 mg         15      5 mg           16      5 mg         17      7.5 mg         18      5 mg         19      5 mg         20            21               22                 23               24               25               26               27               28               29                 30               31                     Date Details   No additional details    Date of next INR:  12/20/2018         How to take your warfarin dose     To take:  5 mg Take 1 of the 5 mg tablets.    To take:  7.5 mg Take 1.5 of the 5 mg tablets.

## 2018-11-08 NOTE — PROGRESS NOTES
ANTICOAGULATION FOLLOW-UP CLINIC VISIT    Patient Name:  Oh Solares  Date:  11/8/2018  Contact Type:  Face to Face    SUBJECTIVE: Continue same plan of care     Patient Findings     Positives No Problem Findings           OBJECTIVE    INR Protime   Date Value Ref Range Status   11/08/2018 2.6 (A) 0.86 - 1.14 Final       ASSESSMENT / PLAN  INR assessment THER    Recheck INR In: 6 WEEKS    INR Location Clinic      Anticoagulation Summary as of 11/8/2018     INR goal 2.0-3.0   Today's INR 2.6   Warfarin maintenance plan 7.5 mg (5 mg x 1.5) on Mon, Fri; 5 mg (5 mg x 1) all other days   Full warfarin instructions 7.5 mg on Mon, Fri; 5 mg all other days   Weekly warfarin total 40 mg   No change documented Zulema Genao RN   Plan last modified Zulema Genao RN (6/1/2018)   Next INR check 12/20/2018   Target end date Indefinite    Indications   Coagulation defect (HCC) [D68.9] [D68.9]         Anticoagulation Episode Summary     INR check location     Preferred lab     Send INR reminders to [facility] CL[facility]    Comments       Anticoagulation Care Providers     Provider Role Specialty Phone number    Brendon Kay MD  Internal Medicine [phone]            See the Encounter Report to view Anticoagulation Flowsheet and Dosing Calendar (Go to Encounters tab in chart review, and find the Anticoagulation Therapy Visit)    Dosage adjustment made based on physician directed care plan.    Zulema Genao RN

## 2018-11-12 ENCOUNTER — TELEPHONE (OUTPATIENT)
Dept: FAMILY MEDICINE | Facility: CLINIC | Age: 73
End: 2018-11-12

## 2018-11-12 DIAGNOSIS — G89.29 CHRONIC BILATERAL LOW BACK PAIN WITHOUT SCIATICA: ICD-10-CM

## 2018-11-12 DIAGNOSIS — M54.50 CHRONIC BILATERAL LOW BACK PAIN WITHOUT SCIATICA: ICD-10-CM

## 2018-11-12 DIAGNOSIS — F51.01 PRIMARY INSOMNIA: ICD-10-CM

## 2018-11-12 RX ORDER — HYDROCODONE BITARTRATE AND ACETAMINOPHEN 5; 325 MG/1; MG/1
1 TABLET ORAL EVERY 6 HOURS PRN
Qty: 30 TABLET | Refills: 0 | Status: SHIPPED | OUTPATIENT
Start: 2018-11-12 | End: 2019-01-02

## 2018-11-12 NOTE — TELEPHONE ENCOUNTER
Reason for Call:  Medication or medication refill:    Do you use a [facility]?  Name of the pharmacy and phone number for the current request:  [facility]     Name of the medication requested: hydrocodone-acetaminophen    Other request: Please call patient when the script is ready for  at the .    Can we leave a detailed message on this number? YES    Phone number patient can be reached at: Cell number on file:    Telephone Information:   Mobile [phone]       Best Time: anytime    Call taken on 11/12/2018 at 12:34 PM by Yusra Call

## 2018-11-13 RX ORDER — ZOLPIDEM TARTRATE 10 MG/1
TABLET ORAL
Qty: 31 TABLET | Refills: 0 | Status: SHIPPED | OUTPATIENT
Start: 2018-11-13 | End: 2018-12-10

## 2018-11-13 NOTE — TELEPHONE ENCOUNTER
Ambien      Last Written Prescription Date:  10/12/2018  Last Fill Quantity: 31,   # refills: 0  Last Office Visit: 8/29/2018  Future Office visit:       Routing refill request to provider for review/approval because:  Drug not on the [facility], [facility] or [facility] refill protocol or controlled substance

## 2018-11-13 NOTE — TELEPHONE ENCOUNTER
Patient picked up his script at the  on 11/13/18.    Yusra DUMONT  Patient Representative  [place]

## 2018-12-10 DIAGNOSIS — F51.01 PRIMARY INSOMNIA: ICD-10-CM

## 2018-12-11 RX ORDER — ZOLPIDEM TARTRATE 10 MG/1
TABLET ORAL
Qty: 31 TABLET | Refills: 0 | Status: SHIPPED | OUTPATIENT
Start: 2018-12-11 | End: 2019-01-04

## 2018-12-20 ENCOUNTER — ANTICOAGULATION THERAPY VISIT (OUTPATIENT)
Dept: NURSING | Facility: CLINIC | Age: 73
End: 2018-12-20
Payer: COMMERCIAL

## 2018-12-20 DIAGNOSIS — D68.9 COAGULATION DEFECT (H): ICD-10-CM

## 2018-12-20 LAB — INR POINT OF CARE: 1.8 (ref 0.86–1.14)

## 2018-12-20 PROCEDURE — 36416 COLLJ CAPILLARY BLOOD SPEC: CPT

## 2018-12-20 PROCEDURE — 99207 ZZC NO CHARGE NURSE ONLY: CPT

## 2018-12-20 PROCEDURE — 85610 PROTHROMBIN TIME: CPT | Mod: QW

## 2018-12-20 NOTE — PROGRESS NOTES
ANTICOAGULATION FOLLOW-UP CLINIC VISIT    Patient Name:  Oh Solares  Date:  2018  Contact Type:  Face to Face    SUBJECTIVE: Going to [place] for a couple months     Patient Findings     Positives:   No Problem Findings           OBJECTIVE    INR Protime   Date Value Ref Range Status   2018 1.8 (A) 0.86 - 1.14 Final       ASSESSMENT / PLAN  INR assessment THER    Recheck INR In: 6 WEEKS    INR Location Clinic      Anticoagulation Summary  As of 2018    INR goal:   2.0-3.0   TTR:   71.7 % (2.8 y)   INR used for dosin.8! (2018)   Warfarin maintenance plan:   7.5 mg (5 mg x 1.5) every Mon, Fri; 5 mg (5 mg x 1) all other days   Full warfarin instructions:   : 7.5 mg; Otherwise 7.5 mg every Mon, Fri; 5 mg all other days   Weekly warfarin total:   40 mg   Plan last modified:   Zulema Genao RN (2018)   Next INR check:   2019   Target end date:   Indefinite    Indications    Coagulation defect (HCC) [D68.9] [D68.9]             Anticoagulation Episode Summary     INR check location:       Preferred lab:       [facility] INR reminders to:   [facility]    Comments:         Anticoagulation Care Providers     Provider Role Specialty Phone number    Brendon Kay MD  Internal Medicine [phone]            See the Encounter Report to view Anticoagulation Flowsheet and Dosing Calendar (Go to Encounters tab in chart review, and find the Anticoagulation Therapy Visit)    Dosage adjustment made based on physician directed care plan.    Zulema Genao RN

## 2019-01-01 NOTE — TELEPHONE ENCOUNTER
Prescription for Ambien was faxed to [facility] [place]      
zolpidem (AMBIEN) 10 MG tablet      Last Written Prescription Date:  11/13/18  Last Fill Quantity: 31,   # refills: 0  Last Office Visit: Rahul Greer Office visit:       Routing refill request to provider for review/approval because:  Drug not on the [facility], [facility] or [facility] refill protocol or controlled substance    
50.5

## 2019-01-02 DIAGNOSIS — M54.50 CHRONIC BILATERAL LOW BACK PAIN WITHOUT SCIATICA: ICD-10-CM

## 2019-01-02 DIAGNOSIS — G89.29 CHRONIC BILATERAL LOW BACK PAIN WITHOUT SCIATICA: ICD-10-CM

## 2019-01-02 RX ORDER — HYDROCODONE BITARTRATE AND ACETAMINOPHEN 5; 325 MG/1; MG/1
1 TABLET ORAL EVERY 6 HOURS PRN
Qty: 30 TABLET | Refills: 0 | Status: SHIPPED | OUTPATIENT
Start: 2019-01-02 | End: 2019-03-25

## 2019-01-02 NOTE — TELEPHONE ENCOUNTER
Requested Prescriptions   Pending Prescriptions Disp Refills     HYDROcodone-acetaminophen (NORCO) 5-325 MG tablet 30 tablet 0     Sig: Take 1 tablet by mouth every 6 hours as needed for pain    There is no refill protocol information for this order         Last Written Prescription Date:  11/12/18  Last Fill Quantity: 30,   # refills: 0  Last Office Visit: 8/29/18  Future Office visit:       Routing refill request to provider for review/approval because:  Drug not on the [facility], [facility] or [facility] refill protocol or controlled substance

## 2019-01-02 NOTE — TELEPHONE ENCOUNTER
Reason for Call:  Medication or medication refill:    Do you use a [facility]?  Name of the pharmacy and phone number for the current [id number]    Name of the medication requested: HYDROcodone-acetaminophen (NORCO) 5-325 MG per tablet    Other request: Patient will  hard copy at .    Can we leave a detailed message on this number? YES    Phone number patient can be reached at: Cell number on file:    Telephone Information:   Mobile [phone]       Best Time: any    Call taken on 1/2/2019 at 10:17 AM by Winter Brito

## 2019-01-04 DIAGNOSIS — F51.01 PRIMARY INSOMNIA: ICD-10-CM

## 2019-01-04 NOTE — TELEPHONE ENCOUNTER
Requested Prescriptions   Pending Prescriptions Disp Refills     zolpidem (AMBIEN) 10 MG tablet [Pharmacy Med Name: ZOLPIDEM TARTRATE 10 MG TABLET] 31 tablet      Sig: TAKE 1 TABLET BY MOUTH NIGHTLY AS NEEDED    There is no refill protocol information for this order         Last Written Prescription Date:  12/11/18  Last Fill Quantity: 31,   # refills: 0  Last Office Visit: 8/29/18  Future Office visit:       Routing refill request to provider for review/approval because:  Drug not on the [facility], [facility] or [facility] refill protocol or controlled substance

## 2019-01-08 RX ORDER — ZOLPIDEM TARTRATE 10 MG/1
TABLET ORAL
Qty: 31 TABLET | Refills: 0 | Status: SHIPPED | OUTPATIENT
Start: 2019-01-08 | End: 2019-02-07

## 2019-02-07 DIAGNOSIS — F51.01 PRIMARY INSOMNIA: ICD-10-CM

## 2019-02-07 NOTE — TELEPHONE ENCOUNTER
zolpidem (AMBIEN) 10 MG tablet      Last Written Prescription Date:  1/8/19  Last Fill Quantity: 31,   # refills: 0  Last Office Visit: Rahul-8/29/18  Future Office visit:       Routing refill request to provider for review/approval because:  Drug not on the [facility], [facility] or [facility] refill protocol or controlled substance

## 2019-02-08 RX ORDER — ZOLPIDEM TARTRATE 10 MG/1
TABLET ORAL
Qty: 31 TABLET | Refills: 0 | Status: SHIPPED | OUTPATIENT
Start: 2019-02-08 | End: 2019-03-12

## 2019-02-11 ENCOUNTER — TELEPHONE (OUTPATIENT)
Dept: FAMILY MEDICINE | Facility: CLINIC | Age: 74
End: 2019-02-11

## 2019-02-11 NOTE — TELEPHONE ENCOUNTER
Prior Authorization Retail Medication Request    Medication/Dose: Zolpidem Tartrate/Ambien 10 mg  ICD code (if different than what is on RX):  F51.0  Previously Tried and Failed:    Rationale:      Insurance Name:  [employer]  Insurance ID:  [id number]       Pharmacy Information (if different than what is on RX)  Name:  [facility]  Phone:  [phone]

## 2019-02-14 NOTE — TELEPHONE ENCOUNTER
PA Initiation    Medication: Zolpidem Tartrate/Ambien 10 mg- INITIATED  Insurance Company: [employer] - Phone [phone] Fax [phone]  [id number] [place], [place] - [address] [place] [address] AT [place] [address]  Filling Pharmacy Phone: [phone]  Filling Pharmacy Fax:    Start Date: 2/14/2019

## 2019-02-14 NOTE — TELEPHONE ENCOUNTER
Prior Authorization Approval    Authorization Effective Date: 2/14/2019  Authorization Expiration Date: 2/13/2021  Medication: Zolpidem Tartrate/Ambien 10 mg- APPROVED  Approved Dose/Quantity:   Reference #:     Insurance Company: [email or web address] - Phone [phone] Fax [phone]  Expected CoPay:       CoPay Card Available:      Foundation Assistance Needed:    Which Pharmacy is filling the prescription (Not needed for infusion/clinic administered): [id number] - [place], [place] - [address] AT [place] [address]  Pharmacy Notified: Yes, THEY WILL NOTIFY PATIENT WHEN MEDICATION IS READY.  Patient Notified: Yes

## 2019-03-12 DIAGNOSIS — Z79.01 LONG TERM CURRENT USE OF ANTICOAGULANT THERAPY: ICD-10-CM

## 2019-03-12 DIAGNOSIS — F51.01 PRIMARY INSOMNIA: ICD-10-CM

## 2019-03-12 NOTE — TELEPHONE ENCOUNTER
"Requested Prescriptions   Pending Prescriptions Disp Refills     zolpidem (AMBIEN) 10 MG tablet [Pharmacy Med Name: ZOLPIDEM TARTRATE 10 MG TABLET] 31 tablet 0     Sig: TAKE 1 TABLET BY MOUTH NIGHTLY AS NEEDED.    There is no refill protocol information for this order         Last Written Prescription Date:  2/8/19  Last Fill Quantity: 31,   # refills: 0  Last Office Visit: 8/29/18  Future Office visit:       Routing refill request to provider for review/approval because:  Drug not on the [facility], [facility] or [facility] refill protocol or controlled substance       warfarin (COUMADIN) 5 MG tablet [Pharmacy Med Name: WARFARIN SODIUM 5 MG TABLET] 100 tablet 3    Last Written Prescription Date:  12/12/17  Last Fill Quantity: 100,  # refills: 3   Last office visit: 8/29/2018 with prescribing provider:     Future Office Visit:     Sig: TAKE 1 TABLET BY MOUTH DAILY    Vitamin K Antagonists Failed - 3/12/2019  9:30 AM       Failed - INR is within goal in the past 6 weeks    Confirm INR is within goal in the past 6 weeks.     Recent Labs   Lab Test 12/20/18   INR 1.8*                      Passed - Recent (12 mo) or future (30 days) visit within the authorizing provider's specialty    Patient had office visit in the last 12 months or has a visit in the next 30 days with authorizing provider or within the authorizing provider's specialty.  See \"Patient Info\" tab in inbasket, or \"Choose Columns\" in Meds & Orders section of the refill encounter.             Passed - Medication is active on med list       Passed - Patient is 18 years of age or older      .  "

## 2019-03-13 RX ORDER — ZOLPIDEM TARTRATE 10 MG/1
TABLET ORAL
Qty: 31 TABLET | Refills: 0 | Status: SHIPPED | OUTPATIENT
Start: 2019-03-13 | End: 2019-04-11

## 2019-03-13 RX ORDER — WARFARIN SODIUM 5 MG/1
TABLET ORAL
Qty: 100 TABLET | Refills: 3 | Status: SHIPPED | OUTPATIENT
Start: 2019-03-13 | End: 2020-02-24

## 2019-03-18 ENCOUNTER — ANTICOAGULATION THERAPY VISIT (OUTPATIENT)
Dept: NURSING | Facility: CLINIC | Age: 74
End: 2019-03-18
Payer: COMMERCIAL

## 2019-03-18 DIAGNOSIS — D68.9 COAGULATION DEFECT (H): ICD-10-CM

## 2019-03-18 LAB — INR POINT OF CARE: 2 (ref 0.86–1.14)

## 2019-03-18 PROCEDURE — 36416 COLLJ CAPILLARY BLOOD SPEC: CPT

## 2019-03-18 PROCEDURE — 85610 PROTHROMBIN TIME: CPT | Mod: QW

## 2019-03-18 PROCEDURE — 99207 ZZC NO CHARGE NURSE ONLY: CPT

## 2019-03-18 NOTE — PROGRESS NOTES
ANTICOAGULATION FOLLOW-UP CLINIC VISIT    Patient Name:  Oh Solares  Date:  3/18/2019  Contact Type:  Face to Face    SUBJECTIVE: same plan of care     Patient Findings            OBJECTIVE    INR Protime   Date Value Ref Range Status   2019 2.0 (A) 0.86 - 1.14 Final       ASSESSMENT / PLAN  INR assessment THER    Recheck INR In: 6 WEEKS    INR Location Clinic      Anticoagulation Summary  As of 3/18/2019    INR goal:   2.0-3.0   TTR:   66.0 % (3 y)   INR used for dosin.0 (3/18/2019)   Warfarin maintenance plan:   7.5 mg (5 mg x 1.5) every Mon, Fri; 5 mg (5 mg x 1) all other days   Full warfarin instructions:   7.5 mg every Mon, Fri; 5 mg all other days   Weekly warfarin total:   40 mg   No change documented:   Zulema Genao RN   Plan last modified:   Zulema Genao RN (2018)   Next INR check:   2019   Target end date:   Indefinite    Indications    Coagulation defect (HCC) [D68.9] [D68.9]             Anticoagulation Episode Summary     INR check location:       Preferred lab:       Send INR reminders to:   [facility]    Comments:         Anticoagulation Care Providers     Provider Role Specialty Phone number    Brendon Kay MD  Internal Medicine [phone]            See the Encounter Report to view Anticoagulation Flowsheet and Dosing Calendar (Go to Encounters tab in chart review, and find the Anticoagulation Therapy Visit)    Dosage adjustment made based on physician directed care plan.    Zulema Genao RN

## 2019-03-25 DIAGNOSIS — G89.29 CHRONIC BILATERAL LOW BACK PAIN WITHOUT SCIATICA: ICD-10-CM

## 2019-03-25 DIAGNOSIS — M54.50 CHRONIC BILATERAL LOW BACK PAIN WITHOUT SCIATICA: ICD-10-CM

## 2019-03-25 RX ORDER — HYDROCODONE BITARTRATE AND ACETAMINOPHEN 5; 325 MG/1; MG/1
1 TABLET ORAL EVERY 6 HOURS PRN
Qty: 30 TABLET | Refills: 0 | Status: SHIPPED | OUTPATIENT
Start: 2019-03-25 | End: 2019-04-29

## 2019-03-25 NOTE — TELEPHONE ENCOUNTER
Requested Prescriptions   Pending Prescriptions Disp Refills     HYDROcodone-acetaminophen (NORCO) 5-325 MG tablet 30 tablet 0     Sig: Take 1 tablet by mouth every 6 hours as needed for pain    There is no refill protocol information for this order        Last Written Prescription Date:  1/2/2019  Last Fill Quantity: 30,  # refills: 0   Last office visit: 8/29/2018 with prescribing provider:  Rahul   Future Office Visit:      Routing refill request to provider for review/approval because:  Drug not on the [facility] refill protocol       Karlee Mcclendon RN  [facility]

## 2019-03-25 NOTE — TELEPHONE ENCOUNTER
Reason for Call:  Other prescription    Detailed comments: HYDROcodone-acetaminophen (NORCO) 5-325 MG tablet    Refill needed.    Phone Number Patient can be reached at: Cell number on file:    Telephone Information:   Mobile [phone]       Best Time: As soon as possible    Can we leave a detailed message on this number? YES    Call taken on 3/25/2019 at 3:06 PM by Lonnie Oseguera

## 2019-03-26 NOTE — TELEPHONE ENCOUNTER
Prescription for Norco was brought tot he  for   [facility]  Team 3 Coordinator

## 2019-04-11 DIAGNOSIS — F51.01 PRIMARY INSOMNIA: ICD-10-CM

## 2019-04-11 RX ORDER — ZOLPIDEM TARTRATE 10 MG/1
TABLET ORAL
Qty: 31 TABLET | Refills: 0 | Status: SHIPPED | OUTPATIENT
Start: 2019-04-11 | End: 2019-05-14

## 2019-04-11 NOTE — TELEPHONE ENCOUNTER
Requested Prescriptions   Pending Prescriptions Disp Refills     zolpidem (AMBIEN) 10 MG tablet [Pharmacy Med Name: ZOLPIDEM TARTRATE 10 MG TABLET] 31 tablet 0     Sig: TAKE 1 TABLET BY MOUTH NIGHTLY AS NEEDED       There is no refill protocol information for this order        Last Written Prescription Date:  3/13/19  Last Fill Quantity: 31,  # refills: 0   Last office visit: 8/29/2018 with prescribing provider   Future Office Visit:      Routing refill request to provider for review/approval because:  Drug not on the [facility] refill protocol     Sayda Johns RN

## 2019-04-29 ENCOUNTER — ANTICOAGULATION THERAPY VISIT (OUTPATIENT)
Dept: NURSING | Facility: CLINIC | Age: 74
End: 2019-04-29
Payer: COMMERCIAL

## 2019-04-29 DIAGNOSIS — G89.29 CHRONIC BILATERAL LOW BACK PAIN WITHOUT SCIATICA: ICD-10-CM

## 2019-04-29 DIAGNOSIS — D68.9 COAGULATION DEFECT (H): ICD-10-CM

## 2019-04-29 DIAGNOSIS — M54.50 CHRONIC BILATERAL LOW BACK PAIN WITHOUT SCIATICA: ICD-10-CM

## 2019-04-29 LAB — INR POINT OF CARE: 2.7 (ref 0.86–1.14)

## 2019-04-29 PROCEDURE — 85610 PROTHROMBIN TIME: CPT | Mod: QW

## 2019-04-29 PROCEDURE — 36416 COLLJ CAPILLARY BLOOD SPEC: CPT

## 2019-04-29 PROCEDURE — 99207 ZZC NO CHARGE NURSE ONLY: CPT

## 2019-04-29 RX ORDER — HYDROCODONE BITARTRATE AND ACETAMINOPHEN 5; 325 MG/1; MG/1
1 TABLET ORAL EVERY 6 HOURS PRN
Qty: 30 TABLET | Refills: 0 | Status: SHIPPED | OUTPATIENT
Start: 2019-04-29 | End: 2019-06-24

## 2019-04-29 NOTE — PROGRESS NOTES
ANTICOAGULATION FOLLOW-UP CLINIC VISIT    Patient Name:  Oh Solares  Date:  2019  Contact Type:  Face to Face    SUBJECTIVE: same plan of care     Patient Findings            OBJECTIVE    INR Protime   Date Value Ref Range Status   2019 2.7 (A) 0.86 - 1.14 Final       ASSESSMENT / PLAN  INR assessment THER    Recheck INR In: 6 WEEKS    INR Location Clinic      Anticoagulation Summary  As of 2019    INR goal:   2.0-3.0   TTR:   67.2 % (3.1 y)   INR used for dosin.7 (2019)   Warfarin maintenance plan:   7.5 mg (5 mg x 1.5) every Mon, Fri; 5 mg (5 mg x 1) all other days   Full warfarin instructions:   7.5 mg every Mon, Fri; 5 mg all other days   Weekly warfarin total:   40 mg   No change documented:   Zulema Genao RN   Plan last modified:   Zulema Genao RN (2018)   Next INR check:   6/10/2019   Target end date:   Indefinite    Indications    Coagulation defect (HCC) [D68.9] [D68.9]             Anticoagulation Episode Summary     INR check location:       Preferred lab:       Send INR reminders to:   [facility]    Comments:         Anticoagulation Care Providers     Provider Role Specialty Phone number    Brendon Kay MD  Internal Medicine [phone]            See the Encounter Report to view Anticoagulation Flowsheet and Dosing Calendar (Go to Encounters tab in chart review, and find the Anticoagulation Therapy Visit)    Dosage adjustment made based on physician directed care plan.    Zulema Genao RN

## 2019-04-29 NOTE — TELEPHONE ENCOUNTER
Reason for Call:  Medication or medication refill:    Do you use a [facility]?  Name of the pharmacy and phone [facility] for the current [facility]    Name of the medication requested: HYDROcodone-acetaminophen (NORCO) 5-325 MG tablet    Other request: Patient will  hard copy at .     Can we leave a detailed message on this number? YES    Phone number patient can be reached at: Cell number on file:    Telephone Information:   Mobile [phone]       Best Time: any    Call taken on 4/29/2019 at 12:04 PM by Winter Brito

## 2019-05-14 DIAGNOSIS — F51.01 PRIMARY INSOMNIA: ICD-10-CM

## 2019-05-14 NOTE — TELEPHONE ENCOUNTER
Requested Prescriptions   Pending Prescriptions Disp Refills     zolpidem (AMBIEN) 10 MG tablet [Pharmacy Med Name: ZOLPIDEM TARTRATE 10 MG TABLET] 31 tablet 0     Sig: TAKE 1 TABLET BY MOUTH NIGHTLY AS NEEDED       There is no refill protocol information for this order         Last Written Prescription Date:  4/11/19  Last Fill Quantity: 31,   # refills: 0  Last Office Visit: 8/29/18  Future Office visit:       Routing refill request to provider for review/approval because:  Drug not on the [facility], [facility] or [facility] refill protocol or controlled substance

## 2019-05-15 RX ORDER — ZOLPIDEM TARTRATE 10 MG/1
TABLET ORAL
Qty: 31 TABLET | Refills: 0 | Status: SHIPPED | OUTPATIENT
Start: 2019-05-15 | End: 2019-06-14

## 2019-06-10 ENCOUNTER — ANTICOAGULATION THERAPY VISIT (OUTPATIENT)
Dept: NURSING | Facility: CLINIC | Age: 74
End: 2019-06-10
Payer: COMMERCIAL

## 2019-06-10 DIAGNOSIS — D68.9 COAGULATION DEFECT (H): ICD-10-CM

## 2019-06-10 LAB — INR POINT OF CARE: 3.2 (ref 0.86–1.14)

## 2019-06-10 PROCEDURE — 85610 PROTHROMBIN TIME: CPT | Mod: QW

## 2019-06-10 PROCEDURE — 99207 ZZC NO CHARGE NURSE ONLY: CPT

## 2019-06-10 PROCEDURE — 36416 COLLJ CAPILLARY BLOOD SPEC: CPT

## 2019-06-10 NOTE — PROGRESS NOTES
ANTICOAGULATION FOLLOW-UP CLINIC VISIT    Patient Name:  Oh Solares  Date:  6/10/2019  Contact Type:  Face to Face    SUBJECTIVE: patient wants usual schedule.    Patient Findings         Clinical Outcomes     Negatives:   Major bleeding event, Thromboembolic event, Anticoagulation-related hospital admission, Anticoagulation-related ED visit, Anticoagulation-related fatality           OBJECTIVE    INR Protime   Date Value Ref Range Status   06/10/2019 3.2 (A) 0.86 - 1.14 Final       ASSESSMENT / PLAN  INR assessment SUPRA    Recheck INR In: 6 WEEKS    INR Location Clinic      Anticoagulation Summary  As of 6/10/2019    INR goal:   2.0-3.0   TTR:   67.0 % (3.2 y)   INR used for dosing:   3.2! (6/10/2019)   Warfarin maintenance plan:   7.5 mg (5 mg x 1.5) every Mon, Fri; 5 mg (5 mg x 1) all other days   Full warfarin instructions:   6/11: Hold; Otherwise 7.5 mg every Mon, Fri; 5 mg all other days   Weekly warfarin total:   40 mg   Plan last modified:   Zulema Genao RN (6/1/2018)   Next INR check:   7/22/2019   Target end date:   Indefinite    Indications    Coagulation defect (HCC) [D68.9] [D68.9]             Anticoagulation Episode Summary     INR check location:       Preferred lab:       Send INR reminders to:   [facility]    Comments:         Anticoagulation Care Providers     Provider Role Specialty Phone number    Brendon Kay MD  Internal Medicine [phone]            See the Encounter Report to view Anticoagulation Flowsheet and Dosing Calendar (Go to Encounters tab in chart review, and find the Anticoagulation Therapy Visit)    Dosage adjustment made based on physician directed care plan.    Zulema Genao RN

## 2019-06-14 DIAGNOSIS — F51.01 PRIMARY INSOMNIA: ICD-10-CM

## 2019-06-14 NOTE — TELEPHONE ENCOUNTER
zolpidem (AMBIEN) 10 MG tablet  Last Written Prescription Date:  05/15/19  Last Fill Quantity: 31,   # refills: 0  Last Office Visit:   Future Office visit:       Routing refill request to provider for review/approval because:  Drug not on the [facility], [facility] or [facility] refill protocol or controlled substance

## 2019-06-19 RX ORDER — ZOLPIDEM TARTRATE 10 MG/1
TABLET ORAL
Qty: 31 TABLET | Refills: 0 | Status: SHIPPED | OUTPATIENT
Start: 2019-06-19 | End: 2019-07-19

## 2019-06-24 ENCOUNTER — TELEPHONE (OUTPATIENT)
Dept: FAMILY MEDICINE | Facility: CLINIC | Age: 74
End: 2019-06-24

## 2019-06-24 DIAGNOSIS — M54.50 CHRONIC BILATERAL LOW BACK PAIN WITHOUT SCIATICA: ICD-10-CM

## 2019-06-24 DIAGNOSIS — G89.29 CHRONIC BILATERAL LOW BACK PAIN WITHOUT SCIATICA: ICD-10-CM

## 2019-06-24 RX ORDER — HYDROCODONE BITARTRATE AND ACETAMINOPHEN 5; 325 MG/1; MG/1
1 TABLET ORAL EVERY 6 HOURS PRN
Qty: 30 TABLET | Refills: 0 | Status: SHIPPED | OUTPATIENT
Start: 2019-06-24 | End: 2019-08-13

## 2019-06-24 NOTE — TELEPHONE ENCOUNTER
Requested Prescriptions   Pending Prescriptions Disp Refills     HYDROcodone-acetaminophen (NORCO) 5-325 MG tablet 30 tablet 0     Sig: Take 1 tablet by mouth every 6 hours as needed for pain       There is no refill protocol information for this order        Last refill 8/29/18  Last OV 4/29/19    Routing refill request to provider for review/approval because:  Drug not on the [facility] refill protocol   Cathy Rogel RN [facility] Triage.

## 2019-06-24 NOTE — TELEPHONE ENCOUNTER
Reason for Call:  Other prescription    Detailed comments: patient needs a prescription for HYDROcodone-acetaminophen to be picked up at the . Please call when ready     Phone Number Patient can be reached at: Home number on file [phone] (home)    Best Time: any    Can we leave a detailed message on this number? YES    Call taken on 6/24/2019 at 3:09 PM by Nicole Correia

## 2019-06-25 NOTE — TELEPHONE ENCOUNTER
Prescription for Norco was brought to the   [facility]  Team 3 Coordinator

## 2019-07-19 DIAGNOSIS — F51.01 PRIMARY INSOMNIA: ICD-10-CM

## 2019-07-19 NOTE — TELEPHONE ENCOUNTER
Requested Prescriptions   Pending Prescriptions Disp Refills     zolpidem (AMBIEN) 10 MG tablet [Pharmacy Med Name: ZOLPIDEM TARTRATE 10 MG TABLET] 31 tablet 0     Sig: TAKE 1 TABLET BY MOUTH EVERY DAY AT BEDTIME AS NEEDED       There is no refill protocol information for this order         Last Written Prescription Date:  6/19/19  Last Fill Quantity: 31,   # refills: 0  Last Office Visit: 8/29/18  Future Office visit:       Routing refill request to provider for review/approval because:  Drug not on the [facility], [facility] or [facility] refill protocol or controlled substance

## 2019-07-22 ENCOUNTER — ANTICOAGULATION THERAPY VISIT (OUTPATIENT)
Dept: NURSING | Facility: CLINIC | Age: 74
End: 2019-07-22
Payer: COMMERCIAL

## 2019-07-22 DIAGNOSIS — D68.9 COAGULATION DEFECT (H): ICD-10-CM

## 2019-07-22 LAB — INR POINT OF CARE: 3.5 (ref 0.86–1.14)

## 2019-07-22 PROCEDURE — 36416 COLLJ CAPILLARY BLOOD SPEC: CPT

## 2019-07-22 PROCEDURE — 99207 ZZC NO CHARGE NURSE ONLY: CPT

## 2019-07-22 PROCEDURE — 85610 PROTHROMBIN TIME: CPT | Mod: QW

## 2019-07-22 NOTE — PROGRESS NOTES
ANTICOAGULATION FOLLOW-UP CLINIC VISIT    Patient Name:  Oh Solares  Date:  7/22/2019  Contact Type:  Face to Face    SUBJECTIVE: jaz 2 wks  Patient Findings     Positives:   Signs/symptoms of bleeding (right eye blood spot on white area inner which is better according to pt), Change in alcohol use (golfing more and drinking while playing more), Change in diet/appetite (states not eating veg's much)        Clinical Outcomes     Negatives:   Major bleeding event, Thromboembolic event, Anticoagulation-related hospital admission, Anticoagulation-related ED visit, Anticoagulation-related fatality           OBJECTIVE    INR Protime   Date Value Ref Range Status   07/22/2019 3.5 (A) 0.86 - 1.14 Final       ASSESSMENT / PLAN  INR assessment SUPRA    Recheck INR In: 2 WEEKS    INR Location Clinic      Anticoagulation Summary  As of 7/22/2019    INR goal:   2.0-3.0   TTR:   64.7 % (3.3 y)   INR used for dosing:   3.5! (7/22/2019)   Warfarin maintenance plan:   7.5 mg (5 mg x 1.5) every Fri; 5 mg (5 mg x 1) all other days   Full warfarin instructions:   7/22: Hold; Otherwise 7.5 mg every Fri; 5 mg all other days   Weekly warfarin total:   37.5 mg   Plan last modified:   Zulema Genao RN (7/22/2019)   Next INR check:   8/5/2019   Target end date:   Indefinite    Indications    Coagulation defect (HCC) [D68.9] [D68.9]             Anticoagulation Episode Summary     INR check location:       Preferred lab:       Send INR reminders to:   [facility]    Comments:         Anticoagulation Care Providers     Provider Role Specialty Phone number    Brendon Kay MD  Internal Medicine [phone]            See the Encounter Report to view Anticoagulation Flowsheet and Dosing Calendar (Go to Encounters tab in chart review, and find the Anticoagulation Therapy Visit)    Dosage adjustment made based on physician directed care plan.    Zulema Genao RN

## 2019-07-23 RX ORDER — ZOLPIDEM TARTRATE 10 MG/1
TABLET ORAL
Qty: 31 TABLET | Refills: 0 | Status: SHIPPED | OUTPATIENT
Start: 2019-07-23 | End: 2019-07-24

## 2019-07-24 DIAGNOSIS — F51.01 PRIMARY INSOMNIA: ICD-10-CM

## 2019-07-24 NOTE — TELEPHONE ENCOUNTER
Requested Prescriptions   Pending Prescriptions Disp Refills     zolpidem (AMBIEN) 10 MG tablet [Pharmacy Med Name: ZOLPIDEM TARTRATE 10 MG TABLET] 31 tablet 0     Sig: TAKE 1 TABLET BY MOUTH EVERY DAY AT BEDTIME AS NEEDED       There is no refill protocol information for this order         Last Written Prescription Date:  7/23/19  Last Fill Quantity: 31,   # refills: 0  Last Office Visit: 8/29/18  Future Office visit:    Next 5 appointments (look out 90 days)    Sep 16, 2019  2:00 PM CDT  [facility] [address] [place] [address] [id number] [phone]           Routing refill request to provider for review/approval because:  Drug not on the [facility], [facility] or [facility] refill protocol or controlled substance

## 2019-07-30 RX ORDER — ZOLPIDEM TARTRATE 10 MG/1
TABLET ORAL
Qty: 31 TABLET | Refills: 0 | Status: SHIPPED | OUTPATIENT
Start: 2019-07-30 | End: 2019-09-11

## 2019-08-05 ENCOUNTER — ANTICOAGULATION THERAPY VISIT (OUTPATIENT)
Dept: NURSING | Facility: CLINIC | Age: 74
End: 2019-08-05
Payer: COMMERCIAL

## 2019-08-05 DIAGNOSIS — D68.9 COAGULATION DEFECT (H): ICD-10-CM

## 2019-08-05 DIAGNOSIS — D68.51 FACTOR V LEIDEN MUTATION (H): ICD-10-CM

## 2019-08-05 LAB — INR POINT OF CARE: 2.6 (ref 0.86–1.14)

## 2019-08-05 PROCEDURE — 36416 COLLJ CAPILLARY BLOOD SPEC: CPT

## 2019-08-05 PROCEDURE — 99207 ZZC NO CHARGE NURSE ONLY: CPT

## 2019-08-05 PROCEDURE — 85610 PROTHROMBIN TIME: CPT | Mod: QW

## 2019-08-05 NOTE — PROGRESS NOTES
ANTICOAGULATION FOLLOW-UP CLINIC VISIT    Patient Name:  Oh Solares  Date:  2019  Contact Type:  Face to Face    SUBJECTIVE: jaz 2 wks  Patient Findings     Positives:   Other complaints (assessed for S&S of bleeding / clotting.  negative findings.)        Clinical Outcomes     Negatives:   Major bleeding event, Thromboembolic event, Anticoagulation-related hospital admission, Anticoagulation-related ED visit, Anticoagulation-related fatality           OBJECTIVE    INR Protime   Date Value Ref Range Status   2019 2.6 (A) 0.86 - 1.14 Final       ASSESSMENT / PLAN  INR assessment THER    Recheck INR In: 2 WEEKS    INR Location Clinic      Anticoagulation Summary  As of 2019    INR goal:   2.0-3.0   TTR:   64.4 % (3.4 y)   INR used for dosin.6 (2019)   Warfarin maintenance plan:   7.5 mg (5 mg x 1.5) every Fri; 5 mg (5 mg x 1) all other days   Full warfarin instructions:   7.5 mg every Fri; 5 mg all other days   Weekly warfarin total:   37.5 mg   No change documented:   Zulema Genao RN   Plan last modified:   Zulema Genao RN (2019)   Next INR check:   2019   Target end date:   Indefinite    Indications    Coagulation defect (HCC) [D68.9] [D68.9]  Factor V Leiden mutation (H) [D68.51]             Anticoagulation Episode Summary     INR check location:       Preferred lab:       Send INR reminders to:   [facility]    Comments:         Anticoagulation Care Providers     Provider Role Specialty Phone number    Brendon Kay MD  Internal Medicine [phone]            See the Encounter Report to view Anticoagulation Flowsheet and Dosing Calendar (Go to Encounters tab in chart review, and find the Anticoagulation Therapy Visit)    Dosage adjustment made based on physician directed care plan.    Zulema Genao RN

## 2019-08-08 DIAGNOSIS — G89.29 CHRONIC BILATERAL LOW BACK PAIN WITHOUT SCIATICA: ICD-10-CM

## 2019-08-08 DIAGNOSIS — M54.50 CHRONIC BILATERAL LOW BACK PAIN WITHOUT SCIATICA: ICD-10-CM

## 2019-08-08 NOTE — TELEPHONE ENCOUNTER
Requested Prescriptions   Pending Prescriptions Disp Refills     HYDROcodone-acetaminophen (NORCO) 5-325 MG tablet 30 tablet 0     Sig: Take 1 tablet by mouth every 6 hours as needed for pain       There is no refill protocol information for this order        Last Written Prescription Date:  6/24/2019  Last Fill Quantity: 30,  # refills: 0   Last office visit: 8/29/2018 with prescribing provider:     Future Office Visit:   Next 5 appointments (look out 90 days)    Sep 16, 2019  2:00 PM CDT  [facility]YS[facility] [address] [place] [phone]  [phone]         Routing refill request to provider for review/approval because:  Drug not on the [facility] refill protocol         Karlee Mcclendon RN  [facility]

## 2019-08-08 NOTE — TELEPHONE ENCOUNTER
Reason for Call:  Medication or medication refill:    Do you use a [facility]?  Name of the pharmacy and phone number for the current request:  Patient     Name of the medication requested:  HYDROcodone-acetaminophen (NORCO) 5-325 MG tablet    Other request:  No     Can we leave a detailed message on this number? YES    Phone number patient can be reached at: Cell number on file:    Telephone Information:   Mobile [phone]       Best Time:  Anytime     Call taken on 8/8/2019 at 10:48 AM by Edilma Diego

## 2019-08-13 RX ORDER — HYDROCODONE BITARTRATE AND ACETAMINOPHEN 5; 325 MG/1; MG/1
1 TABLET ORAL EVERY 6 HOURS PRN
Qty: 30 TABLET | Refills: 0 | Status: SHIPPED | OUTPATIENT
Start: 2019-08-13 | End: 2019-09-16

## 2019-08-13 NOTE — TELEPHONE ENCOUNTER
Prescription for Norco was brought to , unable to reach pt phone was busy  Vesta Bassett  Team 3 Coordinator

## 2019-08-19 ENCOUNTER — ANTICOAGULATION THERAPY VISIT (OUTPATIENT)
Dept: NURSING | Facility: CLINIC | Age: 74
End: 2019-08-19
Payer: COMMERCIAL

## 2019-08-19 DIAGNOSIS — D68.9 COAGULATION DEFECT (H): ICD-10-CM

## 2019-08-19 DIAGNOSIS — D68.51 FACTOR V LEIDEN MUTATION (H): ICD-10-CM

## 2019-08-19 LAB — INR POINT OF CARE: 2.8 (ref 0.86–1.14)

## 2019-08-19 PROCEDURE — 85610 PROTHROMBIN TIME: CPT | Mod: QW

## 2019-08-19 PROCEDURE — 99207 ZZC NO CHARGE NURSE ONLY: CPT

## 2019-08-19 PROCEDURE — 36416 COLLJ CAPILLARY BLOOD SPEC: CPT

## 2019-08-19 NOTE — PROGRESS NOTES
ANTICOAGULATION FOLLOW-UP CLINIC VISIT    Patient Name:  Oh Solares  Date:  2019  Contact Type:  Face to Face    SUBJECTIVE:  Same plan of care.  Patient Findings     Comments:   Assessment negative for symptoms of bleeding or clotting this visit.        Clinical Outcomes     Negatives:   Major bleeding event, Thromboembolic event, Anticoagulation-related hospital admission, Anticoagulation-related ED visit, Anticoagulation-related fatality    Comments:   Assessment negative for symptoms of bleeding or clotting this visit.           OBJECTIVE    INR Protime   Date Value Ref Range Status   2019 2.8 (A) 0.86 - 1.14 Final       ASSESSMENT / PLAN  INR assessment THER    Recheck INR In: 6 WEEKS    INR Location Clinic      Anticoagulation Summary  As of 2019    INR goal:   2.0-3.0   TTR:   64.8 % (3.4 y)   INR used for dosin.8 (2019)   Warfarin maintenance plan:   7.5 mg (5 mg x 1.5) every Fri; 5 mg (5 mg x 1) all other days   Full warfarin instructions:   7.5 mg every Fri; 5 mg all other days   Weekly warfarin total:   37.5 mg   No change documented:   Zulema Genao RN   Plan last modified:   Zulema Genao RN (2019)   Next INR check:   2019   Target end date:   Indefinite    Indications    Coagulation defect (HCC) [D68.9] [D68.9]  Factor V Leiden mutation (H) [D68.51]             Anticoagulation Episode Summary     INR check location:       Preferred lab:       Send INR reminders to:   [facility]    Comments:         Anticoagulation Care Providers     Provider Role Specialty Phone number    Brendon Kay MD  Internal Medicine [phone]            See the Encounter Report to view Anticoagulation Flowsheet and Dosing Calendar (Go to Encounters tab in chart review, and find the Anticoagulation Therapy Visit)    Dosage adjustment made based on physician directed care plan.    Zulema Genao RN

## 2019-08-28 ENCOUNTER — DOCUMENTATION ONLY (OUTPATIENT)
Dept: LAB | Facility: CLINIC | Age: 74
End: 2019-08-28

## 2019-08-28 DIAGNOSIS — Z12.11 SPECIAL SCREENING FOR MALIGNANT NEOPLASMS, COLON: ICD-10-CM

## 2019-08-28 DIAGNOSIS — K25.4 GASTROINTESTINAL HEMORRHAGE ASSOCIATED WITH GASTRIC ULCER: ICD-10-CM

## 2019-08-28 DIAGNOSIS — Z13.6 CARDIOVASCULAR SCREENING; LDL GOAL LESS THAN 160: Primary | ICD-10-CM

## 2019-08-28 DIAGNOSIS — Z12.5 SPECIAL SCREENING, PROSTATE CANCER: ICD-10-CM

## 2019-09-09 DIAGNOSIS — Z13.6 CARDIOVASCULAR SCREENING; LDL GOAL LESS THAN 160: ICD-10-CM

## 2019-09-09 DIAGNOSIS — Z12.5 SPECIAL SCREENING, PROSTATE CANCER: ICD-10-CM

## 2019-09-09 LAB
ANION GAP SERPL CALCULATED.3IONS-SCNC: 7 MMOL/L (ref 3–14)
BUN SERPL-MCNC: 10 MG/DL (ref 7–30)
CALCIUM SERPL-MCNC: 8.9 MG/DL (ref 8.5–10.1)
CHLORIDE SERPL-SCNC: 107 MMOL/L (ref 94–109)
CHOLEST SERPL-MCNC: 229 MG/DL
CO2 SERPL-SCNC: 27 MMOL/L (ref 20–32)
CREAT SERPL-MCNC: 0.94 MG/DL (ref 0.66–1.25)
GFR SERPL CREATININE-BSD FRML MDRD: 80 ML/MIN/{1.73_M2}
GLUCOSE SERPL-MCNC: 103 MG/DL (ref 70–99)
HDLC SERPL-MCNC: 77 MG/DL
LDLC SERPL CALC-MCNC: 123 MG/DL
NONHDLC SERPL-MCNC: 152 MG/DL
POTASSIUM SERPL-SCNC: 4.1 MMOL/L (ref 3.4–5.3)
SODIUM SERPL-SCNC: 141 MMOL/L (ref 133–144)
TRIGL SERPL-MCNC: 146 MG/DL

## 2019-09-09 PROCEDURE — G0103 PSA SCREENING: HCPCS | Performed by: INTERNAL MEDICINE

## 2019-09-09 PROCEDURE — 36415 COLL VENOUS BLD VENIPUNCTURE: CPT | Performed by: INTERNAL MEDICINE

## 2019-09-09 PROCEDURE — 80061 LIPID PANEL: CPT | Performed by: INTERNAL MEDICINE

## 2019-09-09 PROCEDURE — 80048 BASIC METABOLIC PNL TOTAL CA: CPT | Performed by: INTERNAL MEDICINE

## 2019-09-10 LAB — PSA SERPL-ACNC: 1.18 UG/L (ref 0–4)

## 2019-09-11 DIAGNOSIS — F51.01 PRIMARY INSOMNIA: ICD-10-CM

## 2019-09-11 NOTE — TELEPHONE ENCOUNTER
Requested Prescriptions   Pending Prescriptions Disp Refills     zolpidem (AMBIEN) 10 MG tablet [Pharmacy Med Name: ZOLPIDEM TARTRATE 10 MG TABLET] 31 tablet 0     Sig: TAKE 1 TABLET BY MOUTH EVERY DAY AT BEDTIME AS NEEDED       There is no refill protocol information for this order         Last Written Prescription Date:  7/30/19  Last Fill Quantity: 31,   # refills: 0  Last Office Visit: 8/29/18  Future Office visit:    Next 5 appointments (look out 90 days)    Sep 16, 2019  2:00 PM CDT  PHYSICAL with Brendon Kay MD  [facility] [address] [place] [address] [phone]           Routing refill request to provider for review/approval because:  Drug not on the [facility], [facility] or [facility] refill protocol or controlled substance

## 2019-09-13 RX ORDER — ZOLPIDEM TARTRATE 10 MG/1
TABLET ORAL
Qty: 31 TABLET | Refills: 0 | Status: SHIPPED | OUTPATIENT
Start: 2019-09-13 | End: 2019-10-11

## 2019-09-13 NOTE — TELEPHONE ENCOUNTER
Prescription for Ambien was faxed to [facility]  Team 3 Coordinator

## 2019-09-16 ENCOUNTER — OFFICE VISIT (OUTPATIENT)
Dept: FAMILY MEDICINE | Facility: CLINIC | Age: 74
End: 2019-09-16
Payer: COMMERCIAL

## 2019-09-16 VITALS
SYSTOLIC BLOOD PRESSURE: 138 MMHG | HEIGHT: 65 IN | OXYGEN SATURATION: 98 % | HEART RATE: 71 BPM | BODY MASS INDEX: 24.32 KG/M2 | WEIGHT: 146 LBS | DIASTOLIC BLOOD PRESSURE: 87 MMHG

## 2019-09-16 DIAGNOSIS — D68.51 FACTOR V LEIDEN MUTATION (H): ICD-10-CM

## 2019-09-16 DIAGNOSIS — G89.4 CHRONIC PAIN SYNDROME: ICD-10-CM

## 2019-09-16 DIAGNOSIS — M25.579 PAIN IN JOINT, ANKLE AND FOOT, UNSPECIFIED LATERALITY: Primary | ICD-10-CM

## 2019-09-16 DIAGNOSIS — G89.29 CHRONIC BILATERAL LOW BACK PAIN WITHOUT SCIATICA: ICD-10-CM

## 2019-09-16 DIAGNOSIS — M54.50 CHRONIC BILATERAL LOW BACK PAIN WITHOUT SCIATICA: ICD-10-CM

## 2019-09-16 PROCEDURE — 80307 DRUG TEST PRSMV CHEM ANLYZR: CPT | Mod: 90 | Performed by: INTERNAL MEDICINE

## 2019-09-16 PROCEDURE — 99000 SPECIMEN HANDLING OFFICE-LAB: CPT | Performed by: INTERNAL MEDICINE

## 2019-09-16 PROCEDURE — 99397 PER PM REEVAL EST PAT 65+ YR: CPT | Performed by: INTERNAL MEDICINE

## 2019-09-16 RX ORDER — HYDROCODONE BITARTRATE AND ACETAMINOPHEN 5; 325 MG/1; MG/1
1 TABLET ORAL EVERY 6 HOURS PRN
Qty: 30 TABLET | Refills: 0 | Status: SHIPPED | OUTPATIENT
Start: 2019-09-16 | End: 2019-11-04

## 2019-09-16 ASSESSMENT — ENCOUNTER SYMPTOMS
EYE PAIN: 0
ABDOMINAL PAIN: 0
NERVOUS/ANXIOUS: 1
CONSTIPATION: 0
FREQUENCY: 1
HEMATOCHEZIA: 0
COUGH: 0
DIARRHEA: 0
FEVER: 0
DIZZINESS: 0
HEMATURIA: 0
CHILLS: 0

## 2019-09-16 ASSESSMENT — ACTIVITIES OF DAILY LIVING (ADL): CURRENT_FUNCTION: NO ASSISTANCE NEEDED

## 2019-09-16 ASSESSMENT — MIFFLIN-ST. JEOR: SCORE: 1339.13

## 2019-09-16 NOTE — PROGRESS NOTES
"SUBJECTIVE:   Oh Solares is a 74 year old male who presents for Preventive Visit.    Are you in the first 12 months of your Medicare coverage?  No    Healthy Habits:     In general, how would you rate your overall health?  Good    Frequency of exercise:  6-7 days/week    Duration of exercise:  45-60 minutes    Do you usually eat at least 4 servings of fruit and vegetables a day, include whole grains    & fiber and avoid regularly eating high fat or \"junk\" foods?  Yes    Taking medications regularly:  Yes    Medication side effects:  Other    Ability to successfully perform activities of daily living:  No assistance needed    Home Safety:  No safety concerns identified    Hearing Impairment:  No hearing concerns    In the past 6 months, have you been bothered by leaking of urine?  No    In general, how would you rate your overall mental or emotional health?  Good      PHQ-2 Total Score: 0    Additional concerns today:  Yes    Do you feel safe in your environment? Yes    Do you have a Health Care Directive? No: Advance care planning reviewed with patient; information given to patient to review.    Fall risk  Fallen 2 or more times in the past year?: No  Any fall with injury in the past year?: No    Cognitive Screening   1) Repeat 3 items (Leader, Season, Table)    2) Clock draw: ABNORMAL   3) 3 item recall: Recalls 2 objects   Results: NORMAL clock, 1-2 items recalled: COGNITIVE IMPAIRMENT LESS LIKELY    Mini-CogTM Copyright ELAINE Mike. Licensed by the author for use in [facility]; reprinted with permission (eamon@.[facility]). All rights reserved.      Do you have sleep apnea, excessive snoring or daytime drowsiness?: No    Reviewed and updated as needed this visit by clinical staff         Reviewed and updated as needed this visit by Provider        Social History     Tobacco Use     Smoking status: Former Smoker     Last attempt to quit: 4/15/1984     Years since quittin.4     Smokeless tobacco: " Never Used   Substance Use Topics     Alcohol use: Yes     Comment: 1 beer every  day     If you drink alcohol do you typically have >3 drinks per day or >7 drinks per week? No    Alcohol Use 9/16/2019   Prescreen: >3 drinks/day or >7 drinks/week? No   Prescreen: >3 drinks/day or >7 drinks/week? -   No flowsheet data found.            Current providers sharing in care for this patient include:   Patient Care Team:  Brendon Kay MD as PCP - General  Brendon Kay MD as Assigned PCP    The following health maintenance items are reviewed in Epic and correct as of today:  Health Maintenance   Topic Date Due     ZOSTER IMMUNIZATION (2 of 3) 09/11/2009     OP ANNUAL INR REFERRAL  07/23/2016     URINE DRUG SCREEN  08/20/2019     YIN ASSESSMENT  08/29/2019     FALL RISK ASSESSMENT  08/29/2019     PHQ-9  08/29/2019     INFLUENZA VACCINE (1) 09/01/2019     MEDICARE ANNUAL WELLNESS VISIT  08/29/2019     ADVANCE CARE PLANNING  08/23/2022     DTAP/TDAP/TD IMMUNIZATION (3 - Td) 03/25/2024     COLONOSCOPY  09/03/2024     LIPID  09/09/2024     HEPATITIS C SCREENING  Completed     PNEUMOCOCCAL IMMUNIZATION 65+ LOW/MEDIUM RISK  Completed     AORTIC ANEURYSM SCREENING (SYSTEM ASSIGNED)  Completed     IPV IMMUNIZATION  Aged Out     MENINGITIS IMMUNIZATION  Aged Out     Lab work is in process  Labs reviewed in EPIC  BP Readings from Last 3 Encounters:   09/16/19 138/87   08/29/18 128/64   08/23/17 133/71    Wt Readings from Last 3 Encounters:   09/16/19 66.2 kg (146 lb)   08/29/18 64.9 kg (143 lb)   08/23/17 64.4 kg (142 lb)                  Patient Active Problem List   Diagnosis     Factor V Leiden mutation (H)     GI bleed     ED (erectile dysfunction)     CARDIOVASCULAR SCREENING; LDL GOAL LESS THAN 160     Advanced directives, counseling/discussion     Sleep stage dysfunction     Anxiety     Dupuytren's contracture - left     Chronic pain     Long-term (current) use of anticoagulants [Z79.01]     Coagulation defect (HCC)  [D68.9]     Chronic pain syndrome     Past Surgical History:   Procedure Laterality Date     C VENOUS THROMBOSIS STUDY       CLOSED RX ELBOW DISLOCATION  96    left     COLECTOMY       COLONOSCOPY       COLONOSCOPY WITH CO2 INSUFFLATION N/A 9/3/2014    Procedure: COLONOSCOPY WITH CO2 INSUFFLATION;  Surgeon: West Jerez MD;  Location: [facility] OR     SMALL BOWEL RESECTION      partial     VASECTOMY         Social History     Tobacco Use     Smoking status: Former Smoker     Last attempt to quit: 4/15/1984     Years since quittin.4     Smokeless tobacco: Never Used   Substance Use Topics     Alcohol use: Yes     Comment: 1 beer every  day     No family history on file.      Current Outpatient Medications   Medication Sig Dispense Refill     ALPRAZolam (XANAX) 0.25 MG tablet TAKE 1 TABLET BY MOUTH THREE TIMES DAILY AS NEEDED FOR ANXIETY 30 tablet 0     HYDROcodone-acetaminophen (NORCO) 5-325 MG tablet Take 1 tablet by mouth every 6 hours as needed for pain 30 tablet 0     Pediatric Multiple Vit-Vit C (VITAMIN DAILY PO) Take 500 mg by mouth daily.       saw palmetto ([employer] SAW PALMETTO) 160 MG CAPS Take 3 capsules by mouth daily. 1 capsule 12     TYLENOL 325 MG OR TABS as needed       warfarin (COUMADIN) 5 MG tablet TAKE 1 TABLET BY MOUTH DAILY 100 tablet 3     zolpidem (AMBIEN) 10 MG tablet TAKE 1 TABLET BY MOUTH EVERY DAY AT BEDTIME AS NEEDED 31 tablet 0         Review of Systems   Constitutional: Negative for chills and fever.   HENT: Negative for congestion and ear pain.    Eyes: Negative for pain.   Respiratory: Negative for cough.    Cardiovascular: Negative for chest pain.   Gastrointestinal: Negative for abdominal pain, constipation, diarrhea and hematochezia.   Genitourinary: Positive for frequency. Negative for hematuria.   Neurological: Negative for dizziness.   Psychiatric/Behavioral: The patient is nervous/anxious.      Constitutional, HEENT, cardiovascular, pulmonary, gi and gu systems  "are negative, except as otherwise noted.    OBJECTIVE:   There were no vitals taken for this visit. Estimated body mass index is 23.83 kg/m  as calculated from the following:    Height as of 8/29/18: 1.65 m (5' 4.96\").    Weight as of 8/29/18: 64.9 kg (143 lb).  Physical Exam  GENERAL: healthy, alert and no distress  EYES: Eyes grossly normal to inspection, PERRL and conjunctivae and sclerae normal  HENT: ear canals and TM's normal, nose and mouth without ulcers or lesions  NECK: no adenopathy, no asymmetry, masses, or scars and thyroid normal to palpation  RESP: lungs clear to auscultation - no rales, rhonchi or wheezes  CV: regular rate and rhythm, normal S1 S2, no S3 or S4, no murmur, click or rub, no peripheral edema and peripheral pulses strong  ABDOMEN: soft, nontender, no hepatosplenomegaly, no masses and bowel sounds normal  MS: no gross musculoskeletal defects noted, no edema  SKIN: no suspicious lesions or rashes  NEURO: Normal strength and tone, mentation intact and speech normal  BACK: no CVA tenderness, no paralumbar tenderness  PSYCH: mentation appears normal, affect normal/bright  LYMPH: no cervical, supraclavicular, axillary, or inguinal adenopathy    Diagnostic Test Results:  Labs reviewed in Epic  Results for orders placed or performed in visit on 09/09/19   PSA, screen   Result Value Ref Range    PSA 1.18 0 - 4 ug/L   Basic metabolic panel   Result Value Ref Range    Sodium 141 133 - 144 mmol/L    Potassium 4.1 3.4 - 5.3 mmol/L    Chloride 107 94 - 109 mmol/L    Carbon Dioxide 27 20 - 32 mmol/L    Anion Gap 7 3 - 14 mmol/L    Glucose 103 (H) 70 - 99 mg/dL    Urea Nitrogen 10 7 - 30 mg/dL    Creatinine 0.94 0.66 - 1.25 mg/dL    GFR Estimate 80 >60 mL/min/[1.73_m2]    GFR Estimate If Black >90 >60 mL/min/[1.73_m2]    Calcium 8.9 8.5 - 10.1 mg/dL   Lipid panel reflex to direct LDL Fasting   Result Value Ref Range    Cholesterol 229 (H) <200 mg/dL    Triglycerides 146 <150 mg/dL    HDL Cholesterol 77 " ">39 mg/dL    LDL Cholesterol Calculated 123 (H) <100 mg/dL    Non HDL Cholesterol 152 (H) <130 mg/dL       ASSESSMENT / PLAN:       ICD-10-CM    1. Pain in joint, ankle and foot, unspecified laterality M25.579 ORTHOTICS REFERRAL   2. Chronic pain syndrome G89.4 Drug  Screen Comprehensive, Urine w/o Reported Meds (Pain Care Package)   3. Factor V Leiden mutation (H) D68.51 INR CLINIC REFERRAL   4. Chronic bilateral low back pain without sciatica M54.5 HYDROcodone-acetaminophen (NORCO) 5-325 MG tablet    G89.29        End of Life Planning:  Patient currently has an advanced directive: Yes.  Practitioner is supportive of decision.    COUNSELING:  Reviewed preventive health counseling, as reflected in patient instructions    Estimated body mass index is 23.83 kg/m  as calculated from the following:    Height as of 8/29/18: 1.65 m (5' 4.96\").    Weight as of 8/29/18: 64.9 kg (143 lb).         reports that he quit smoking about 35 years ago. He has never used smokeless tobacco.      Appropriate preventive services were discussed with this patient, including applicable screening as appropriate for cardiovascular disease, diabetes, osteopenia/osteoporosis, and glaucoma.  As appropriate for age/gender, discussed screening for colorectal cancer, prostate cancer, breast cancer, and cervical cancer. Checklist reviewing preventive services available has been given to the patient.    Reviewed patients plan of care and provided an AVS. The Basic Care Plan (routine screening as documented in Health Maintenance) for Oh meets the Care Plan requirement. This Care Plan has been established and reviewed with the Patient.    ICD-10-CM    1. Pain in joint, ankle and foot, unspecified laterality M25.579 ORTHOTICS REFERRAL   2. Chronic pain syndrome G89.4 Drug  Screen Comprehensive, Urine w/o Reported Meds (Pain Care Package)   3. Factor V Leiden mutation (H) D68.51 INR CLINIC REFERRAL   4. Chronic bilateral low back pain without " sciatica M54.5 HYDROcodone-acetaminophen (NORCO) 5-325 MG tablet    G89.29        Counseling Resources:  ATP IV Guidelines  Pooled Cohorts Equation Calculator  Breast Cancer Risk Calculator  FRAX Risk Assessment  ICSI Preventive Guidelines  Dietary Guidelines for Americans, 2010  USDA's MyPlate  ASA Prophylaxis  Lung CA Screening    Brendon Kay MD  [facility]    Identified Health Risks:

## 2019-09-16 NOTE — NURSING NOTE
A prescription for Norco 5-325 mg was given to patient at the end of the visit.  Lisa Adhikari MA

## 2019-09-20 LAB — COMPREHEN DRUG ANALYSIS UR: NORMAL

## 2019-09-30 ENCOUNTER — ANTICOAGULATION THERAPY VISIT (OUTPATIENT)
Dept: NURSING | Facility: CLINIC | Age: 74
End: 2019-09-30
Payer: COMMERCIAL

## 2019-09-30 DIAGNOSIS — D68.51 FACTOR V LEIDEN MUTATION (H): ICD-10-CM

## 2019-09-30 DIAGNOSIS — D68.9 COAGULATION DEFECT (H): ICD-10-CM

## 2019-09-30 LAB — INR POINT OF CARE: 2.9 (ref 0.86–1.14)

## 2019-09-30 PROCEDURE — 36416 COLLJ CAPILLARY BLOOD SPEC: CPT

## 2019-09-30 PROCEDURE — 85610 PROTHROMBIN TIME: CPT | Mod: QW

## 2019-09-30 PROCEDURE — 99207 ZZC NO CHARGE NURSE ONLY: CPT

## 2019-09-30 NOTE — PROGRESS NOTES
ANTICOAGULATION FOLLOW-UP CLINIC VISIT    Patient Name:  Oh Solares  Date:  2019  Contact Type:  Face to Face    SUBJECTIVE: same plan of care.  Patient Findings     Comments:   Assessment negative for symptoms of bleeding or clotting this visit        Clinical Outcomes     Negatives:   Major bleeding event, Thromboembolic event, Anticoagulation-related hospital admission, Anticoagulation-related ED visit, Anticoagulation-related fatality    Comments:   Assessment negative for symptoms of bleeding or clotting this visit           OBJECTIVE    INR Protime   Date Value Ref Range Status   2019 2.9 (A) 0.86 - 1.14 Final       ASSESSMENT / PLAN  INR assessment THER    Recheck INR In: 6 WEEKS    INR Location Clinic      Anticoagulation Summary  As of 2019    INR goal:   2.0-3.0   TTR:   66.0 % (3.5 y)   INR used for dosin.9 (2019)   Warfarin maintenance plan:   7.5 mg (5 mg x 1.5) every Fri; 5 mg (5 mg x 1) all other days   Full warfarin instructions:   7.5 mg every Fri; 5 mg all other days   Weekly warfarin total:   37.5 mg   No change documented:   Zulema Genao RN   Plan last modified:   Zulema Genao RN (2019)   Next INR check:   2019   Target end date:   Indefinite    Indications    Coagulation defect (HCC) [D68.9] [D68.9]  Factor V Leiden mutation (H) [D68.51]             Anticoagulation Episode Summary     INR check location:       Preferred lab:       Send INR reminders to:   [facility]    Comments:         Anticoagulation Care Providers     Provider Role Specialty Phone number    Brendon Kay MD  Internal Medicine [phone]            See the Encounter Report to view Anticoagulation Flowsheet and Dosing Calendar (Go to Encounters tab in chart review, and find the Anticoagulation Therapy Visit)    Dosage adjustment made based on physician directed care plan.    Zulema Genao RN

## 2019-10-11 DIAGNOSIS — F51.01 PRIMARY INSOMNIA: ICD-10-CM

## 2019-10-11 NOTE — TELEPHONE ENCOUNTER
Requested Prescriptions   Pending Prescriptions Disp Refills     zolpidem (AMBIEN) 10 MG tablet [Pharmacy Med Name: ZOLPIDEM TARTRATE 10 MG TABLET] 31 tablet 0     Sig: TAKE 1 TABLET BY MOUTH EVERY DAY AT BEDTIME AS NEEDED       There is no refill protocol information for this order        Routing refill request to provider for review/approval because:  Drug not on the [facility] refill protocol     Molly Marcial RN, BSN, PHN  [facility]: [place]

## 2019-10-14 RX ORDER — ZOLPIDEM TARTRATE 10 MG/1
TABLET ORAL
Qty: 31 TABLET | Refills: 0 | Status: SHIPPED | OUTPATIENT
Start: 2019-10-14 | End: 2019-11-14

## 2019-11-04 ENCOUNTER — TELEPHONE (OUTPATIENT)
Dept: FAMILY MEDICINE | Facility: CLINIC | Age: 74
End: 2019-11-04

## 2019-11-04 DIAGNOSIS — G89.29 CHRONIC BILATERAL LOW BACK PAIN WITHOUT SCIATICA: ICD-10-CM

## 2019-11-04 DIAGNOSIS — M54.50 CHRONIC BILATERAL LOW BACK PAIN WITHOUT SCIATICA: ICD-10-CM

## 2019-11-04 RX ORDER — HYDROCODONE BITARTRATE AND ACETAMINOPHEN 5; 325 MG/1; MG/1
1 TABLET ORAL EVERY 6 HOURS PRN
Qty: 30 TABLET | Refills: 0 | Status: SHIPPED | OUTPATIENT
Start: 2019-11-04 | End: 2020-01-06

## 2019-11-04 NOTE — TELEPHONE ENCOUNTER
Requested Prescriptions   Pending Prescriptions Disp Refills     HYDROcodone-acetaminophen (NORCO) 5-325 MG tablet 30 tablet 0     Sig: Take 1 tablet by mouth every 6 hours as needed for pain       There is no refill protocol information for this order        Last Written Prescription Date:  9/16/2019  Last Fill Quantity: 30,  # refills: 0   Last office visit: 9/16/2019 with prescribing provider:     Future Office Visit:    Routing refill request to provider for review/approval because:  Drug not on the [facility] refill protocol       Karlee Mcclendon RN  [facility]

## 2019-11-04 NOTE — TELEPHONE ENCOUNTER
Reason for Call:  Other     Detailed comments: patient needs a prescription for HYDROcodone-acetaminophen (NORCO)  Sent to [facility] [id number] [place] [facility], [place] - [address]    Phone Number Patient can be reached at: Home number on file [phone] (home)    Best Time: any    Can we leave a detailed message on this number? YES    Call taken on 11/4/2019 at 8:01 AM by Nicole Correia

## 2019-11-12 ENCOUNTER — ANTICOAGULATION THERAPY VISIT (OUTPATIENT)
Dept: NURSING | Facility: CLINIC | Age: 74
End: 2019-11-12
Payer: COMMERCIAL

## 2019-11-12 DIAGNOSIS — D68.51 FACTOR V LEIDEN MUTATION (H): ICD-10-CM

## 2019-11-12 DIAGNOSIS — D68.9 COAGULATION DEFECT (H): ICD-10-CM

## 2019-11-12 LAB — INR POINT OF CARE: 3.4 (ref 0.86–1.14)

## 2019-11-12 PROCEDURE — 85610 PROTHROMBIN TIME: CPT | Mod: QW

## 2019-11-12 PROCEDURE — 99207 ZZC NO CHARGE NURSE ONLY: CPT

## 2019-11-12 PROCEDURE — 36416 COLLJ CAPILLARY BLOOD SPEC: CPT

## 2019-11-12 NOTE — PROGRESS NOTES
ANTICOAGULATION FOLLOW-UP CLINIC VISIT    Patient Name:  Oh Solares  Date:  11/12/2019  Contact Type:  Face to Face    SUBJECTIVE: INR has been trending upward for months.  Over range today with symptoms below.  Plan lower dose by 6.7% with recheck in 2 weeks.  Patient Findings     Positives:   Signs/symptoms of bleeding (left eye / outer white portion blood spot)    Comments:   Assessment per above        Clinical Outcomes     Negatives:   Major bleeding event, Thromboembolic event, Anticoagulation-related hospital admission, Anticoagulation-related ED visit, Anticoagulation-related fatality    Comments:   Assessment per above           OBJECTIVE    INR Protime   Date Value Ref Range Status   11/12/2019 3.4 (A) 0.86 - 1.14 Final       ASSESSMENT / PLAN  INR assessment SUPRA    Recheck INR In: 2 WEEKS    INR Location Clinic      Anticoagulation Summary  As of 11/12/2019    INR goal:   2.0-3.0   TTR:   64.5 % (3.7 y)   INR used for dosing:   3.4! (11/12/2019)   Warfarin maintenance plan:   5 mg (5 mg x 1) every day   Full warfarin instructions:   11/13: Hold; Otherwise 5 mg every day   Weekly warfarin total:   35 mg   Plan last modified:   Zulema Genao, RN (11/12/2019)   Next INR check:   11/26/2019   Target end date:   Indefinite    Indications    Coagulation defect (HCC) [D68.9] [D68.9]  Factor V Leiden mutation (H) [D68.51]             Anticoagulation Episode Summary     INR check location:       Preferred lab:       Send INR reminders to:   [facility]    Comments:         Anticoagulation Care Providers     Provider Role Specialty Phone number    Brendon Kay MD  Internal Medicine [phone]            See the Encounter Report to view Anticoagulation Flowsheet and Dosing Calendar (Go to Encounters tab in chart review, and find the Anticoagulation Therapy Visit)    Dosage adjustment made based on physician directed care plan.    Zulema Genao, RN

## 2019-11-14 DIAGNOSIS — F51.01 PRIMARY INSOMNIA: ICD-10-CM

## 2019-11-14 NOTE — TELEPHONE ENCOUNTER
Requested Prescriptions   Pending Prescriptions Disp Refills     zolpidem (AMBIEN) 10 MG tablet [Pharmacy Med Name: ZOLPIDEM TARTRATE 10 MG TABLET] 31 tablet 0     Sig: TAKE 1 TABLET BY MOUTH EVERY DAY AT BEDTIME AS NEEDED       There is no refill protocol information for this order         Last Written Prescription Date:  10/14/19  Last Fill Quantity: 31,   # refills: 0  Last Office Visit: 9/16/19  Future Office visit:       Routing refill request to provider for review/approval because:  Drug not on the [facility], [facility] or [facility] refill protocol or controlled substance

## 2019-11-18 RX ORDER — ZOLPIDEM TARTRATE 10 MG/1
TABLET ORAL
Qty: 31 TABLET | Refills: 0 | Status: SHIPPED | OUTPATIENT
Start: 2019-11-18 | End: 2019-12-18

## 2019-11-26 ENCOUNTER — TELEPHONE (OUTPATIENT)
Dept: FAMILY MEDICINE | Facility: CLINIC | Age: 74
End: 2019-11-26

## 2019-11-26 DIAGNOSIS — D68.51 FACTOR V LEIDEN MUTATION (H): ICD-10-CM

## 2019-11-26 DIAGNOSIS — D68.9 COAGULATION DEFECT (H): ICD-10-CM

## 2019-11-26 DIAGNOSIS — Z79.01 LONG TERM CURRENT USE OF ANTICOAGULANT THERAPY: Primary | ICD-10-CM

## 2019-11-26 DIAGNOSIS — Z79.01 LONG TERM CURRENT USE OF ANTICOAGULANT THERAPY: ICD-10-CM

## 2019-11-26 LAB
CAPILLARY BLOOD COLLECTION: NORMAL
INR PPP: 2 (ref 0.86–1.14)

## 2019-11-26 PROCEDURE — 36416 COLLJ CAPILLARY BLOOD SPEC: CPT | Performed by: INTERNAL MEDICINE

## 2019-11-26 PROCEDURE — 85610 PROTHROMBIN TIME: CPT | Performed by: INTERNAL MEDICINE

## 2019-11-26 NOTE — TELEPHONE ENCOUNTER
ANTICOAGULATION MANAGEMENT     Patient Name:  Oh Solares  Date:  2019    ASSESSMENT /SUBJECTIVE:      Today's INR result of 2.0 is therapeutic. Goal INR of 2.0-3.0      Warfarin dose taken: Warfarin taken as previously instructed    Diet: Change in alcohol intake may be affecting INR Pt stated he may have had more etoh than usual around the time of his previous INR    Medication changes/ interactions: Concurrent use of Norco and warfarin may increase risk of bleeding, but not expected to affect INR (has new rx for Norco from ; pt states he rarely uses it)    Previous INR: Supratherapeutic     S/S of bleeding or thromboembolism: No    New injury or illness:  No    Upcoming surgery, procedure or cardioversion:  No    Additional findings: None    PLAN:    Spoke with Oh regarding INR result and instructed:     Warfarin Dosing Instructions: Continue your current warfarin dose of 5mg daily    Instructed patient to follow up no later than: 2 weeks    Education provided: Yes: Concurrent use of Tylenol and warfarin (pt has Norco script) can increase bleeding risk; increased etoh can increase INR    Oh verbalizes understanding and agrees to warfarin dosing plan.    Instructed to call the Anticoagulation Clinic for any changes, questions or concerns. (#[phone])        OBJECTIVE:  INR   Date Value Ref Range Status   2019 2.00 (H) 0.86 - 1.14 Final     Comment:     This test is intended for monitoring Coumadin therapy.  Results are not   accurate in patients with prolonged INR due to factor deficiency.               Anticoagulation Summary  As of 2019    INR goal:   2.0-3.0   TTR:   44.3 % (1 y)   INR used for dosin.00 (2019)   Warfarin maintenance plan:   5 mg (5 mg x 1) every day   Full warfarin instructions:   5 mg every day   Weekly warfarin total:   35 mg   Plan last modified:   Zulema Genao, RN (2019)   Next INR check:   12/10/2019   Priority:   Maintenance    Target end date:   Indefinite    Indications    Coagulation defect (HCC) [D68.9] [D68.9]  Factor V Leiden mutation (H) [D68.51]             Anticoagulation Episode Summary     INR check location:       Preferred lab:       Send INR reminders to:   [facility]    Comments:         Anticoagulation Care Providers     Provider Role Specialty Phone number    Brendon Kay MD  Internal Medicine [phone]

## 2019-11-26 NOTE — TELEPHONE ENCOUNTER
INR standing order placed to route to [place].    Autumn Casiano, PharmD BCACP  Anticoagulation Clinical Pharmacist

## 2019-12-09 ENCOUNTER — ANTICOAGULATION THERAPY VISIT (OUTPATIENT)
Dept: NURSING | Facility: CLINIC | Age: 74
End: 2019-12-09
Payer: COMMERCIAL

## 2019-12-09 DIAGNOSIS — D68.9 COAGULATION DEFECT (H): ICD-10-CM

## 2019-12-09 DIAGNOSIS — D68.51 FACTOR V LEIDEN MUTATION (H): ICD-10-CM

## 2019-12-09 LAB — INR POINT OF CARE: 3.2 (ref 0.86–1.14)

## 2019-12-09 PROCEDURE — 85610 PROTHROMBIN TIME: CPT | Mod: QW

## 2019-12-09 PROCEDURE — 36416 COLLJ CAPILLARY BLOOD SPEC: CPT

## 2019-12-09 PROCEDURE — 99207 ZZC NO CHARGE NURSE ONLY: CPT

## 2019-12-09 NOTE — PROGRESS NOTES
"ANTICOAGULATION FOLLOW-UP CLINIC VISIT    Patient Name:  Oh Solares  Date:  12/9/2019  Contact Type:  Face to Face    SUBJECTIVE: 2 wk jaz  Patient Findings     Positives:   Signs/symptoms of bleeding (left eye inner white area \"red\"), Extra doses (patient raised his warfarin dose on his own to 7.5 mg Mondays and 5 mg x 6 days when his last INR was 2.0.)    Comments:   Assessment per above        Clinical Outcomes     Negatives:   Major bleeding event, Thromboembolic event, Anticoagulation-related hospital admission, Anticoagulation-related ED visit, Anticoagulation-related fatality    Comments:   Assessment per above           OBJECTIVE    INR Protime   Date Value Ref Range Status   12/09/2019 3.2 (A) 0.86 - 1.14 Final       ASSESSMENT / PLAN  INR assessment SUPRA    Recheck INR In: 2 WEEKS    INR Location Clinic      Anticoagulation Summary  As of 12/9/2019    INR goal:   2.0-3.0   TTR:   43.6 % (1 y)   INR used for dosing:   3.2! (12/9/2019)   Warfarin maintenance plan:   5 mg (5 mg x 1) every day   Full warfarin instructions:   5 mg every day   Weekly warfarin total:   35 mg   No change documented:   Zulema Genao RN   Plan last modified:   uZlema Genao RN (11/12/2019)   Next INR check:   12/23/2019   Priority:   High   Target end date:   Indefinite    Indications    Coagulation defect (HCC) [D68.9] [D68.9]  Factor V Leiden mutation (H) [D68.51]             Anticoagulation Episode Summary     INR check location:       Preferred lab:       Send INR reminders to:   [facility]    Comments:         Anticoagulation Care Providers     Provider Role Specialty Phone number    Brendon Kay MD  Internal Medicine [phone]            See the Encounter Report to view Anticoagulation Flowsheet and Dosing Calendar (Go to Encounters tab in chart review, and find the Anticoagulation Therapy Visit)    Dosage adjustment made based on physician directed care plan.    Zulema eGnao RN        "

## 2019-12-17 DIAGNOSIS — F51.01 PRIMARY INSOMNIA: ICD-10-CM

## 2019-12-18 RX ORDER — ZOLPIDEM TARTRATE 10 MG/1
TABLET ORAL
Qty: 31 TABLET | Refills: 0 | Status: SHIPPED | OUTPATIENT
Start: 2019-12-18 | End: 2019-12-23

## 2019-12-18 NOTE — TELEPHONE ENCOUNTER
Requested Prescriptions   Pending Prescriptions Disp Refills     zolpidem (AMBIEN) 10 MG tablet [Pharmacy Med Name: ZOLPIDEM TARTRATE 10 MG TABLET] 31 tablet 0     Sig: TAKE 1 TABLET BY MOUTH EVERY DAY AT BEDTIME AS NEEDED       There is no refill protocol information for this order         Last Written Prescription Date:  11-18-19  Last Fill Quantity: 31,   # refills: 0  Last Office Visit: 9-16-19  Future Office visit:       Routing refill request to provider for review/approval because:  Drug not on the [facility], [facility] or [facility] refill protocol or controlled substance

## 2019-12-23 ENCOUNTER — ANTICOAGULATION THERAPY VISIT (OUTPATIENT)
Dept: NURSING | Facility: CLINIC | Age: 74
End: 2019-12-23
Payer: COMMERCIAL

## 2019-12-23 ENCOUNTER — TELEPHONE (OUTPATIENT)
Dept: FAMILY MEDICINE | Facility: CLINIC | Age: 74
End: 2019-12-23

## 2019-12-23 DIAGNOSIS — D68.51 FACTOR V LEIDEN MUTATION (H): ICD-10-CM

## 2019-12-23 DIAGNOSIS — D68.9 COAGULATION DEFECT (H): ICD-10-CM

## 2019-12-23 DIAGNOSIS — F51.01 PRIMARY INSOMNIA: ICD-10-CM

## 2019-12-23 LAB — INR POINT OF CARE: 1.7 (ref 0.86–1.14)

## 2019-12-23 PROCEDURE — 85610 PROTHROMBIN TIME: CPT | Mod: QW

## 2019-12-23 PROCEDURE — 99207 ZZC NO CHARGE NURSE ONLY: CPT

## 2019-12-23 PROCEDURE — 36416 COLLJ CAPILLARY BLOOD SPEC: CPT

## 2019-12-23 RX ORDER — ZOLPIDEM TARTRATE 10 MG/1
10 TABLET ORAL
Qty: 31 TABLET | Refills: 0 | Status: SHIPPED | OUTPATIENT
Start: 2020-02-13 | End: 2020-07-16

## 2019-12-23 RX ORDER — ZOLPIDEM TARTRATE 10 MG/1
TABLET ORAL
Qty: 31 TABLET | Refills: 0 | Status: SHIPPED | OUTPATIENT
Start: 2020-02-13 | End: 2020-03-17

## 2019-12-23 NOTE — TELEPHONE ENCOUNTER
Pt was in clinic today for INR and stopped me to ask that he get 2 written prescriptions for the Zolpidem 10 mg. He needs 2 hand copies for February and March. He would like to pick them up when he get his prescription on Jan 13th, 2020, The reason for the hard copies is due to the fact that he is going to [place] for 2 months and [place] requires a hard copy.    Routing to PCP.    Lisa Adhikari MA

## 2019-12-23 NOTE — PROGRESS NOTES
ANTICOAGULATION FOLLOW-UP CLINIC VISIT    Patient Name:  Oh Solares  Date:  2019  Contact Type:  Face to Face    SUBJECTIVE: jaz 2 wks  Patient Findings     Comments:   Assessment negative for symptoms of concern        Clinical Outcomes     Negatives:   Major bleeding event, Thromboembolic event, Anticoagulation-related hospital admission, Anticoagulation-related ED visit, Anticoagulation-related fatality    Comments:   Assessment negative for symptoms of concern           OBJECTIVE    INR Protime   Date Value Ref Range Status   2019 1.7 (A) 0.86 - 1.14 Final       ASSESSMENT / PLAN  INR assessment SUB    Recheck INR In: 2 WEEKS    INR Location Clinic      Anticoagulation Summary  As of 2019    INR goal:   2.0-3.0   TTR:   46.1 % (1 y)   INR used for dosin.7! (2019)   Warfarin maintenance plan:   7.5 mg (5 mg x 1.5) every Mon; 5 mg (5 mg x 1) all other days   Full warfarin instructions:   7.5 mg every Mon; 5 mg all other days   Weekly warfarin total:   37.5 mg   Plan last modified:   Zulema Genao RN (2019)   Next INR check:   1/10/2020   Priority:   High   [facility] end date:   Indefinite    Indications    Coagulation defect (HCC) [D68.9] [D68.9]  Factor V Leiden mutation (H) [D68.51]             Anticoagulation Episode Summary     INR check location:       Preferred lab:       Send INR reminders to:   [facility]    Comments:         Anticoagulation Care Providers     Provider Role Specialty Phone number    Brendon Kay MD  Internal Medicine [phone]            See the Encounter Report to view Anticoagulation Flowsheet and Dosing Calendar (Go to Encounters tab in chart review, and find the Anticoagulation Therapy Visit)    Dosage adjustment made based on physician directed care plan.    Zulema Genao RN

## 2020-01-05 ENCOUNTER — TELEPHONE (OUTPATIENT)
Dept: FAMILY MEDICINE | Facility: CLINIC | Age: 75
End: 2020-01-05

## 2020-01-05 ENCOUNTER — NURSE TRIAGE (OUTPATIENT)
Dept: NURSING | Facility: CLINIC | Age: 75
End: 2020-01-05

## 2020-01-05 DIAGNOSIS — G89.29 CHRONIC BILATERAL LOW BACK PAIN WITHOUT SCIATICA: ICD-10-CM

## 2020-01-05 DIAGNOSIS — M54.50 CHRONIC BILATERAL LOW BACK PAIN WITHOUT SCIATICA: ICD-10-CM

## 2020-01-05 NOTE — TELEPHONE ENCOUNTER
Oh would like a refill of his [place].  He uses [facility] pharmacy in [facility] in [place] on [address]    Please call Oh once this has been addressed  Use his mobile number.[phone]  It's okay to leave a detailed message on his voicemail if he doesn't answer.    Routed:P [id number] CP Dr Rahul GONZALEZ, RN [facility]     Please close encounter once addressed

## 2020-01-05 NOTE — TELEPHONE ENCOUNTER
Oh would like a refill of his [place].  He uses [facility] pharmacy in [facility] in [place] on [address]    Please call Oh once this has been addressed  Use his mobile number.[phone]  It's okay to leave a detailed message on his voicemail if he doesn't answer.    Routed:P [id number] CP Dr Rahul GONZALEZ, RN [place] [facility]     Please close encounter once addressed

## 2020-01-06 ENCOUNTER — TELEPHONE (OUTPATIENT)
Dept: FAMILY MEDICINE | Facility: CLINIC | Age: 75
End: 2020-01-06

## 2020-01-06 RX ORDER — HYDROCODONE BITARTRATE AND ACETAMINOPHEN 5; 325 MG/1; MG/1
1 TABLET ORAL EVERY 6 HOURS PRN
Qty: 30 TABLET | Refills: 0 | Status: SHIPPED | OUTPATIENT
Start: 2020-01-06 | End: 2020-04-14

## 2020-01-06 NOTE — TELEPHONE ENCOUNTER
Reason for Call:  Medication or medication refill:    Do you use a [facility]?  Name of the pharmacy and phone number for the current request:  [id number] - [place], [place] - [address] [facility] [phone] (Phone)  [phone] (Fax)       Name of the medication requested: zolpidem (AMBIEN) 10 MG tabletTake 1 tablet (10 mg) by mouth nightly as needed for sleep       Other request: PT stated packing for [place] and believes they accidentally shredded the prescriptions and hoping to have it sent to pharmacy.  Please call with any questions.    Can we leave a detailed message on this number? YES    Phone number patient can be reached at: Cell number on file:    Telephone Information:   Mobile [phone]       Best Time: Anytime    Call taken on 1/6/2020 at 3:17 PM by Jackie Kenney

## 2020-01-06 NOTE — TELEPHONE ENCOUNTER
Requested Prescriptions   Pending Prescriptions Disp Refills     HYDROcodone-acetaminophen (NORCO) 5-325 MG tablet 30 tablet 0     Sig: Take 1 tablet by mouth every 6 hours as needed for pain       There is no refill protocol information for this order        Last refill 11/4/19 # 30  Last OV 9/16/19    Routing refill request to provider for review/approval because:  Drug not on the [facility] refill protocol     Cathy Rogel RN,BSN  [facility]

## 2020-01-06 NOTE — TELEPHONE ENCOUNTER
Patient called and stated to please disregard his request for Ambien. He stated he found his prescription and does not need another one yet.    Please disregard message below    Thank you,  Nikki Zapata  Patient Representative

## 2020-01-10 ENCOUNTER — ANTICOAGULATION THERAPY VISIT (OUTPATIENT)
Dept: NURSING | Facility: CLINIC | Age: 75
End: 2020-01-10
Payer: COMMERCIAL

## 2020-01-10 DIAGNOSIS — Z79.01 LONG TERM CURRENT USE OF ANTICOAGULANT THERAPY: Primary | ICD-10-CM

## 2020-01-10 DIAGNOSIS — D68.9 COAGULATION DEFECT (H): ICD-10-CM

## 2020-01-10 DIAGNOSIS — D68.51 FACTOR V LEIDEN MUTATION (H): ICD-10-CM

## 2020-01-10 LAB — INR POINT OF CARE: 2.9 (ref 0.86–1.14)

## 2020-01-10 PROCEDURE — 36416 COLLJ CAPILLARY BLOOD SPEC: CPT

## 2020-01-10 PROCEDURE — 99207 ZZC NO CHARGE NURSE ONLY: CPT

## 2020-01-10 PROCEDURE — 85610 PROTHROMBIN TIME: CPT | Mod: QW

## 2020-01-10 NOTE — PROGRESS NOTES
ANTICOAGULATION FOLLOW-UP CLINIC VISIT    Patient Name:  Oh Solares  Date:  1/10/2020  Contact Type:  Face to Face    SUBJECTIVE: patient going to [place] for a couple months.  He will call for an appointment when he returns to [place].  He will get INR out of state as needed.  Patient Findings     Comments:   No symptoms of concern today        Clinical Outcomes     Negatives:   Major bleeding event, Thromboembolic event, Anticoagulation-related hospital admission, Anticoagulation-related ED visit, Anticoagulation-related fatality    Comments:   No symptoms of concern today           OBJECTIVE    INR Protime   Date Value Ref Range Status   01/10/2020 2.9 (A) 0.86 - 1.14 Final       ASSESSMENT / PLAN  INR assessment THER    Recheck INR In: 4 WEEKS    INR Location Clinic      Anticoagulation Summary  As of 1/10/2020    INR goal:   2.0-3.0   TTR:   49.8 % (1 y)   INR used for dosin.9 (1/10/2020)   Warfarin maintenance plan:   7.5 mg (5 mg x 1.5) every Mon; 5 mg (5 mg x 1) all other days   Full warfarin instructions:   7.5 mg every Mon; 5 mg all other days   Weekly warfarin total:   37.5 mg   No change documented:   Zulema Genao, RN   Plan last modified:   Zulema Genao RN (2019)   Next INR check:   3/13/2020   Priority:   Maintenance   Target end date:   Indefinite    Indications    Coagulation defect (HCC) [D68.9] [D68.9]  Factor V Leiden mutation (H) [D68.51]             Anticoagulation Episode Summary     INR check location:       Preferred lab:       Send INR reminders to:   [facility]    Comments:         Anticoagulation Care Providers     Provider Role Specialty Phone number    Brendon Kay MD  Internal Medicine [phone]            See the Encounter Report to view Anticoagulation Flowsheet and Dosing Calendar (Go to Encounters tab in chart review, and find the Anticoagulation Therapy Visit)    Dosage adjustment made based on physician directed care  plan.    Zulema Genao RN

## 2020-02-23 DIAGNOSIS — Z79.01 LONG TERM CURRENT USE OF ANTICOAGULANT THERAPY: ICD-10-CM

## 2020-02-24 ENCOUNTER — HEALTH MAINTENANCE LETTER (OUTPATIENT)
Age: 75
End: 2020-02-24

## 2020-02-24 RX ORDER — WARFARIN SODIUM 5 MG/1
5 TABLET ORAL DAILY
Qty: 100 TABLET | Refills: 1 | Status: SHIPPED | OUTPATIENT
Start: 2020-02-24 | End: 2020-06-23

## 2020-02-24 NOTE — TELEPHONE ENCOUNTER
Anticoagulation Monitoring Instructions   Latest Ref Rng & Units    1/10/2020 7.5 mg every Mon; 5 mg all other days   Prescription approved per [facility] Refill Protocol.  Elle Michael RN  Anticoagulation Nurse - [facility] [place]

## 2020-02-24 NOTE — TELEPHONE ENCOUNTER
"Requested Prescriptions   Pending Prescriptions Disp Refills     warfarin ANTICOAGULANT (COUMADIN) 5 MG tablet [Pharmacy Med Name: WARFARIN SODIUM 5 MG TABLET] 90 tablet 4     Sig: TAKE 1 TABLET BY MOUTH DAILY   Last Written Prescription Date:  3/13/19  Last Fill Quantity: 100,  # refills: 3   Last office visit: 9/16/2019 with prescribing provider:     Future Office Visit:        Vitamin K Antagonists Failed - 2/23/2020  9:12 AM        Failed - INR is within goal in the past 6 weeks     Confirm INR is within goal in the past 6 weeks.     Recent Labs   Lab Test 01/10/20   INR 2.9*                       Failed - Medication is active on med list        Passed - Recent (12 mo) or future (30 days) visit within the authorizing provider's specialty     Patient has had an office visit with the authorizing provider or a provider within the authorizing providers department within the previous 12 mos or has a future within next 30 days. See \"Patient Info\" tab in inbasket, or \"Choose Columns\" in Meds & Orders section of the refill encounter.              Passed - Patient is 18 years of age or older          "

## 2020-03-15 DIAGNOSIS — F51.01 PRIMARY INSOMNIA: ICD-10-CM

## 2020-03-16 ENCOUNTER — TELEPHONE (OUTPATIENT)
Dept: FAMILY MEDICINE | Facility: CLINIC | Age: 75
End: 2020-03-16

## 2020-03-16 NOTE — TELEPHONE ENCOUNTER
Patient called to schedule INR appt after being in [place] for 2 months.  Patient was advised to stay home for 14 days due to the corona virus and then come into clinic to have INR tested at that time.  Patient last INR was therapeutic and denies any reason to have it checked sooner.  Patient agrees to this plan, scheduled for 3/30 at [facility].    Radha Esquivel RN  Anticoagulation Clinic

## 2020-03-16 NOTE — TELEPHONE ENCOUNTER
Requested Prescriptions   Pending Prescriptions Disp Refills     zolpidem (AMBIEN) 10 MG tablet [Pharmacy Med Name: ZOLPIDEM TARTRATE 10 MG TABLET] 31 tablet 0     Sig: TAKE 1 TABLET BY MOUTH EVERY DAY AT BEDTIME AS NEEDED       There is no refill protocol information for this order         Last Written Prescription Date:  2/13/20  Last Fill Quantity: 31,   # refills: 0  Last Office Visit: 9/16/19  Future Office visit:       Routing refill request to provider for review/approval because:  Drug not on the [facility], [facility] or [facility] refill protocol or controlled substance

## 2020-03-16 NOTE — TELEPHONE ENCOUNTER
Reason for call:  Patient reporting a symptom    Symptom or request: cough     Duration (how long have symptoms been present): kayla 15 thru mid February,    Have you been treated for this before? No    Additional comments: Patient had cough, no fever or Shortness of breath. Patient was in [place] from 01/15/20-03/13/20. No longer having symptoms, but wants to make sure he doesn't need to be tested.     Phone Number patient can be reached at:  Cell number on file:    Telephone Information:   Mobile [phone]       Best Time:  anytime    Can we leave a detailed message on this number:  YES    Call taken on 3/16/2020 at 2:36 PM by Alejandro Lazaro

## 2020-03-17 RX ORDER — ZOLPIDEM TARTRATE 10 MG/1
TABLET ORAL
Qty: 31 TABLET | Refills: 0 | Status: SHIPPED | OUTPATIENT
Start: 2020-03-17 | End: 2020-04-16

## 2020-03-17 NOTE — TELEPHONE ENCOUNTER
RN spoke with patient. Reports no symptoms x but had recent travel to [place].     Denies SOB or difficulty breathing. RN instructed patient to complete [facility] visit for symptoms. Instructions given on how to complete. Patient verbalized understanding.     Molly Marcial RN, BSN, PHN  [facility]: [place]

## 2020-03-24 ENCOUNTER — TRANSFERRED RECORDS (OUTPATIENT)
Dept: HEALTH INFORMATION MANAGEMENT | Facility: CLINIC | Age: 75
End: 2020-03-24

## 2020-03-27 ENCOUNTER — TELEPHONE (OUTPATIENT)
Dept: FAMILY MEDICINE | Facility: CLINIC | Age: 75
End: 2020-03-27

## 2020-03-27 DIAGNOSIS — F41.9 ANXIETY: ICD-10-CM

## 2020-03-27 NOTE — TELEPHONE ENCOUNTER
Patient was last seen 9/16/19 by Dr. Kay.   Xanax has not been filled since 10/12/18?    Patient due for routine visit 9/16/20 per plan at last visit.    Has someone else been managing his anxiety?    I called patient, he states he typically did not use it much but is feeling more anxious with the covid 19 outbreak so needs a refill.      Alprazolam    Routing refill request to provider for review/approval because:  Drug not on the [facility] refill protocol     Lina Byrnes RN  [facility]

## 2020-03-27 NOTE — TELEPHONE ENCOUNTER
Reason for Call:  Medication or medication refill:    Do you use a [facility]?  Name of the pharmacy and phone number for the current request:  [facility], [address], [place] [phone]    Name of the medication requested: ALPRAZolam (XANAX) 0.25 MG tablet    Other request: Patient is needing refill on above prescription. Please call with any questions or when this is complete.     Can we leave a detailed message on this number? YES    Phone number patient can be reached at: Cell number on file:    Telephone Information:   Mobile [phone]       Best Time: anytime      Call taken on 3/27/2020 at 1:16 PM by Alejandro Lazaro

## 2020-03-30 ENCOUNTER — ANTICOAGULATION THERAPY VISIT (OUTPATIENT)
Dept: FAMILY MEDICINE | Facility: CLINIC | Age: 75
End: 2020-03-30

## 2020-03-30 DIAGNOSIS — D68.51 FACTOR V LEIDEN MUTATION (H): ICD-10-CM

## 2020-03-30 DIAGNOSIS — D68.9 COAGULATION DEFECT (H): ICD-10-CM

## 2020-03-30 DIAGNOSIS — Z79.01 LONG TERM CURRENT USE OF ANTICOAGULANT THERAPY: ICD-10-CM

## 2020-03-30 LAB — INR PPP: 3 (ref 0.86–1.14)

## 2020-03-30 PROCEDURE — 85610 PROTHROMBIN TIME: CPT | Performed by: INTERNAL MEDICINE

## 2020-03-30 PROCEDURE — 99207 ZZC NO CHARGE NURSE ONLY: CPT | Performed by: INTERNAL MEDICINE

## 2020-03-30 PROCEDURE — 36416 COLLJ CAPILLARY BLOOD SPEC: CPT | Performed by: INTERNAL MEDICINE

## 2020-03-30 RX ORDER — ALPRAZOLAM 0.25 MG
TABLET ORAL
Qty: 30 TABLET | Refills: 0 | Status: SHIPPED | OUTPATIENT
Start: 2020-03-30 | End: 2020-05-08

## 2020-03-30 NOTE — PROGRESS NOTES
Anticoagulation Management    Unable to reach Oh today.    Today's INR result of 3.0 is therapeutic (goal INR of 2.0-3.0).  Result received from: Clinic Lab    Follow up required to confirm warfarin dose taken and assess for changes    Left message to call [phone]      Anticoagulation clinic to follow up    Danay Pradhan RN  Transfer return call to: [phone]  ANTICOAGULATION FOLLOW-UP CLINIC VISIT    Patient Name:  Oh Solares  Date:  3/30/2020  Contact Type:  Telephone    SUBJECTIVE:  Patient Findings     Comments:   The patient was assessed for diet, medication, and activity level changes, missed or extra doses, bruising or bleeding, with no problem findings.          Clinical Outcomes     Negatives:   Major bleeding event, Thromboembolic event, Anticoagulation-related hospital admission, Anticoagulation-related ED visit, Anticoagulation-related fatality    Comments:   The patient was assessed for diet, medication, and activity level changes, missed or extra doses, bruising or bleeding, with no problem findings.             OBJECTIVE    INR   Date Value Ref Range Status   03/30/2020 3.00 (H) 0.86 - 1.14 Final       ASSESSMENT / PLAN  INR assessment THER    Recheck INR In: 6 WEEKS    INR Location Outside lab      Anticoagulation Summary  As of 3/30/2020    INR goal:   2.0-3.0   TTR:   68.2 % (1 y)   INR used for dosing:   3.00 (3/30/2020)   Warfarin maintenance plan:   7.5 mg (5 mg x 1.5) every Mon; 5 mg (5 mg x 1) all other days   Full warfarin instructions:   7.5 mg every Mon; 5 mg all other days   Weekly warfarin total:   37.5 mg   No change documented:   Danay Pradhan RN   Plan last modified:   Zulema Genao RN (12/23/2019)   Next INR check:   5/11/2020   Priority:   Maintenance   Target end date:   Indefinite    Indications    Coagulation defect (HCC) [D68.9] [D68.9]  Factor V Leiden mutation (H) [D68.51]             Anticoagulation Episode Summary     INR check location:        Preferred lab:       Send INR reminders to:   [facility]    Comments:         Anticoagulation Care Providers     Provider Role Specialty Phone number    Brendon Kay MD  Internal Medicine [phone]            See the Encounter Report to view Anticoagulation Flowsheet and Dosing Calendar (Go to Encounters tab in chart review, and find the Anticoagulation Therapy Visit)        Danay Pradhan RN

## 2020-04-10 DIAGNOSIS — G89.29 CHRONIC BILATERAL LOW BACK PAIN WITHOUT SCIATICA: ICD-10-CM

## 2020-04-10 DIAGNOSIS — M54.50 CHRONIC BILATERAL LOW BACK PAIN WITHOUT SCIATICA: ICD-10-CM

## 2020-04-10 NOTE — TELEPHONE ENCOUNTER
Reason for Call:  Medication or medication refill:    Do you use a [facility]?  Name of the pharmacy and phone number for the current request:  [facility] [place] [address]     Name of the medication requested: HYDROcodone-acetaminophen (NORCO) 5-325 MG tablet    Other request: anytime    Can we leave a detailed message on this number? YES    Phone number patient can be reached at: Home number on file [phone] (home)    Best Time: anytime    Call taken on 4/10/2020 at 9:48 AM by Portia Zaldivar

## 2020-04-14 RX ORDER — HYDROCODONE BITARTRATE AND ACETAMINOPHEN 5; 325 MG/1; MG/1
1 TABLET ORAL EVERY 6 HOURS PRN
Qty: 30 TABLET | Refills: 0 | Status: SHIPPED | OUTPATIENT
Start: 2020-04-14 | End: 2020-05-19

## 2020-04-14 NOTE — TELEPHONE ENCOUNTER
Requested Prescriptions   Pending Prescriptions Disp Refills     HYDROcodone-acetaminophen (NORCO) 5-325 MG tablet 30 tablet 0     Sig: Take 1 tablet by mouth every 6 hours as needed for pain       There is no refill protocol information for this order          Routing to PCP.  Lisa Adhikari MA

## 2020-04-16 DIAGNOSIS — F51.01 PRIMARY INSOMNIA: ICD-10-CM

## 2020-04-16 RX ORDER — ZOLPIDEM TARTRATE 10 MG/1
TABLET ORAL
Qty: 31 TABLET | Refills: 0 | Status: SHIPPED | OUTPATIENT
Start: 2020-04-16 | End: 2020-05-11

## 2020-04-16 NOTE — TELEPHONE ENCOUNTER
Requested Prescriptions   Pending Prescriptions Disp Refills     zolpidem (AMBIEN) 10 MG tablet [Pharmacy Med Name: ZOLPIDEM TARTRATE 10 MG TABLET] 31 tablet 0     Sig: TAKE 1 TABLET BY MOUTH EVERY DAY AT BEDTIME AS NEEDED       There is no refill protocol information for this order         Last Written Prescription Date:  3-  Last Fill Quantity: 31,   # refills: 0  Last Office Visit: 9-16-19  Future Office visit:       Routing refill request to provider for review/approval because:  Drug not on the [facility], [facility] or [facility] refill protocol or controlled substance

## 2020-05-11 ENCOUNTER — ANTICOAGULATION THERAPY VISIT (OUTPATIENT)
Dept: FAMILY MEDICINE | Facility: CLINIC | Age: 75
End: 2020-05-11

## 2020-05-11 DIAGNOSIS — D68.51 FACTOR V LEIDEN MUTATION (H): ICD-10-CM

## 2020-05-11 DIAGNOSIS — F51.01 PRIMARY INSOMNIA: ICD-10-CM

## 2020-05-11 DIAGNOSIS — D68.9 COAGULATION DEFECT (H): ICD-10-CM

## 2020-05-11 DIAGNOSIS — Z79.01 LONG TERM CURRENT USE OF ANTICOAGULANT THERAPY: ICD-10-CM

## 2020-05-11 LAB
CAPILLARY BLOOD COLLECTION: NORMAL
INR PPP: 3.6 (ref 0.86–1.14)

## 2020-05-11 PROCEDURE — 85610 PROTHROMBIN TIME: CPT | Performed by: INTERNAL MEDICINE

## 2020-05-11 PROCEDURE — 36416 COLLJ CAPILLARY BLOOD SPEC: CPT | Performed by: INTERNAL MEDICINE

## 2020-05-11 RX ORDER — ZOLPIDEM TARTRATE 10 MG/1
TABLET ORAL
Qty: 31 TABLET | Refills: 0 | Status: SHIPPED | OUTPATIENT
Start: 2020-05-11 | End: 2020-06-15

## 2020-05-11 NOTE — PROGRESS NOTES
Second attempt tried to reach patient for assessment.  Ledy Mccall,Clinic Rn  [facility]        Anticoagulation Management    Unable to reach Amish  today.    Today's INR result of 3.6 is supratherapeutic (goal INR of 2.0-3.0).  Result received from: Clinic Lab    Follow up required to discuss out of range INR           Anticoagulation clinic to follow up    Ledy Mccall RN

## 2020-05-11 NOTE — TELEPHONE ENCOUNTER
Requested Prescriptions   Pending Prescriptions Disp Refills     zolpidem (AMBIEN) 10 MG tablet [Pharmacy Med Name: ZOLPIDEM TARTRATE 10 MG TABLET] 31 tablet 0     Sig: TAKE 1 TABLET BY MOUTH EVERY DAY AT BEDTIME AS NEEDED       There is no refill protocol information for this order        Last refill 2/13/20  Last OV 9/16/20    Routing refill request to provider for review/approval because:  Drug not on the [facility] refill protocol   Cathy CATN,RN  [facility]

## 2020-05-19 DIAGNOSIS — G89.29 CHRONIC BILATERAL LOW BACK PAIN WITHOUT SCIATICA: ICD-10-CM

## 2020-05-19 DIAGNOSIS — M54.50 CHRONIC BILATERAL LOW BACK PAIN WITHOUT SCIATICA: ICD-10-CM

## 2020-05-19 RX ORDER — HYDROCODONE BITARTRATE AND ACETAMINOPHEN 5; 325 MG/1; MG/1
1 TABLET ORAL EVERY 6 HOURS PRN
Qty: 30 TABLET | Refills: 0 | Status: SHIPPED | OUTPATIENT
Start: 2020-05-19 | End: 2020-07-02

## 2020-05-19 NOTE — TELEPHONE ENCOUNTER
Reason for Call:  Medication or medication refill:    Do you use a [facility]?  No    Name of the medication requested: HYDROcodone-acetaminophen (NORCO)     Other request: Please send to [facility] [place] [phone]     Can we leave a detailed message on this number? YES    Phone number patient can be reached at: Home number on file [phone] (home)    Best Time: Anytime    Call taken on 5/19/2020 at 8:31 AM by Erlinda Barragan

## 2020-05-19 NOTE — TELEPHONE ENCOUNTER
Requested Prescriptions   Pending Prescriptions Disp Refills     HYDROcodone-acetaminophen (NORCO) 5-325 MG tablet 30 tablet 0     Sig: Take 1 tablet by mouth every 6 hours as needed for pain       There is no refill protocol information for this order        Last Written Prescription Date:  4/14/2020  Last Fill Quantity: 30,  # refills: 0   Last office visit: 9/16/2019 with prescribing provider:     Future Office Visit:          Routing refill request to provider for review/approval because:  Drug not on the [facility] refill protocol       Karlee Mcclendon RN  [facility]

## 2020-05-28 ENCOUNTER — ANTICOAGULATION THERAPY VISIT (OUTPATIENT)
Dept: FAMILY MEDICINE | Facility: CLINIC | Age: 75
End: 2020-05-28

## 2020-05-28 DIAGNOSIS — D68.51 FACTOR V LEIDEN MUTATION (H): ICD-10-CM

## 2020-05-28 DIAGNOSIS — Z79.01 LONG TERM CURRENT USE OF ANTICOAGULANT THERAPY: ICD-10-CM

## 2020-05-28 DIAGNOSIS — D68.9 COAGULATION DEFECT (H): ICD-10-CM

## 2020-05-28 LAB
CAPILLARY BLOOD COLLECTION: NORMAL
INR PPP: 2.1 (ref 0.86–1.14)

## 2020-05-28 PROCEDURE — 99207 ZZC NO CHARGE NURSE ONLY: CPT | Performed by: INTERNAL MEDICINE

## 2020-05-28 PROCEDURE — 85610 PROTHROMBIN TIME: CPT | Performed by: INTERNAL MEDICINE

## 2020-05-28 PROCEDURE — 36416 COLLJ CAPILLARY BLOOD SPEC: CPT | Performed by: INTERNAL MEDICINE

## 2020-05-28 NOTE — PROGRESS NOTES
ANTICOAGULATION FOLLOW-UP TELEPHONE VISIT    Patient Name:  Oh Solares  Date:  2020  Contact Type:  Telephone/ I spoke with Amish this morning in follow up to his INR done at the lab.  Discussion / visit note per below.    SUBJECTIVE:  Patient Findings     Comments:   No changes or concerns today.  Patient states he prefers to resume his warfarin dose with the extra 1/2 on  and recheck his usual schedule as we've had for a while now.  See flowsheet.  This seems reasonable as the previous result was a one time occurrence and should probably only have been corrected versus changing the schedule.        Clinical Outcomes     Negatives:   Major bleeding event, Thromboembolic event, Anticoagulation-related hospital admission, Anticoagulation-related ED visit, Anticoagulation-related fatality    Comments:   No changes or concerns today.  Patient states he prefers to resume his warfarin dose with the extra 1/2 on  and recheck his usual schedule as we've had for a while now.  See flowsheet.  This seems reasonable as the previous result was a one time occurrence and should probably only have been corrected versus changing the schedule.           OBJECTIVE    Recent labs: (last 7 days)     20  0851   INR 2.10*       ASSESSMENT / PLAN  INR assessment THER    Recheck INR In: 6 WEEKS    INR Location Outside lab      Anticoagulation Summary  As of 2020    INR goal:   2.0-3.0   TTR:   56.1 % (1 y)   INR used for dosin.10 (2020)   Warfarin maintenance plan:   7.5 mg (5 mg x 1.5) every Mon; 5 mg (5 mg x 1) all other days   Full warfarin instructions:   7.5 mg every Mon; 5 mg all other days   Weekly warfarin total:   37.5 mg   Plan last modified:   Zulema Genao, RN (2020)   Next INR check:   2020   Priority:   Maintenance   [facility] end date:   Indefinite    Indications    Coagulation defect (HCC) [D68.9] [D68.9]  Factor V Leiden mutation (H) [D68.51]              Anticoagulation Episode Summary     INR check location:       Preferred lab:       Send INR reminders to:   [facility]    Comments:         Anticoagulation Care Providers     Provider Role Specialty Phone number    Brendon Kay MD  Internal Medicine [phone]            See the Encounter Report to view Anticoagulation Flowsheet and Dosing Calendar (Go to Encounters tab in chart review, and find the Anticoagulation Therapy Visit)    Dosage adjustment made based on physician directed care plan.    Zulema Genoa RN

## 2020-06-15 DIAGNOSIS — F51.01 PRIMARY INSOMNIA: ICD-10-CM

## 2020-06-15 RX ORDER — ZOLPIDEM TARTRATE 10 MG/1
TABLET ORAL
Qty: 31 TABLET | Refills: 0 | Status: SHIPPED | OUTPATIENT
Start: 2020-06-15 | End: 2020-07-20

## 2020-06-15 NOTE — TELEPHONE ENCOUNTER
Requested Prescriptions   Pending Prescriptions Disp Refills    zolpidem (AMBIEN) 10 MG tablet [Pharmacy Med Name: ZOLPIDEM TARTRATE 10 MG TABLET] 31 tablet 0     Sig: TAKE 1 TABLET BY MOUTH EVERY DAY AT BEDTIME AS NEEDED       There is no refill protocol information for this order        Routing refill request to provider for review/approval because:  Drug not on the [facility] refill protocol   Cathy CATN,RN  [facility]

## 2020-06-16 DIAGNOSIS — F41.9 ANXIETY: ICD-10-CM

## 2020-06-17 RX ORDER — ALPRAZOLAM 0.25 MG
TABLET ORAL
Qty: 30 TABLET | Refills: 0 | Status: SHIPPED | OUTPATIENT
Start: 2020-06-17 | End: 2020-08-14

## 2020-06-17 NOTE — TELEPHONE ENCOUNTER
Routing refill request to provider for review/approval because:  Drug not on the [facility] refill protocol   Cathy MENDOZA,RN  [facility]

## 2020-06-23 DIAGNOSIS — Z79.01 LONG TERM CURRENT USE OF ANTICOAGULANT THERAPY: ICD-10-CM

## 2020-06-23 RX ORDER — WARFARIN SODIUM 5 MG/1
TABLET ORAL
Qty: 100 TABLET | Refills: 0 | Status: SHIPPED | OUTPATIENT
Start: 2020-06-23 | End: 2020-06-24

## 2020-06-24 ENCOUNTER — TELEPHONE (OUTPATIENT)
Dept: FAMILY MEDICINE | Facility: CLINIC | Age: 75
End: 2020-06-24

## 2020-06-24 DIAGNOSIS — Z79.01 LONG TERM CURRENT USE OF ANTICOAGULANT THERAPY: ICD-10-CM

## 2020-06-24 RX ORDER — WARFARIN SODIUM 5 MG/1
TABLET ORAL
Qty: 100 TABLET | Refills: 0 | Status: SHIPPED | OUTPATIENT
Start: 2020-06-24 | End: 2020-08-14

## 2020-06-24 NOTE — TELEPHONE ENCOUNTER
Reason for Call:  Other prescription    Detailed comments: Script clarification on warfin sodium 5 mg. Please resend with directions for use. Thank you    Phone Number [facility] pharmacy     Best Time:     Can we leave a detailed message on this number? Not Applicable    Call taken on 6/24/2020 at 1:33 PM by Diya Marie

## 2020-07-02 ENCOUNTER — TELEPHONE (OUTPATIENT)
Dept: FAMILY MEDICINE | Facility: CLINIC | Age: 75
End: 2020-07-02

## 2020-07-02 DIAGNOSIS — M54.50 CHRONIC BILATERAL LOW BACK PAIN WITHOUT SCIATICA: ICD-10-CM

## 2020-07-02 DIAGNOSIS — G89.29 CHRONIC BILATERAL LOW BACK PAIN WITHOUT SCIATICA: ICD-10-CM

## 2020-07-02 RX ORDER — HYDROCODONE BITARTRATE AND ACETAMINOPHEN 5; 325 MG/1; MG/1
1 TABLET ORAL EVERY 6 HOURS PRN
Qty: 30 TABLET | Refills: 0 | Status: SHIPPED | OUTPATIENT
Start: 2020-07-02 | End: 2020-09-02

## 2020-07-02 NOTE — TELEPHONE ENCOUNTER
Reason for Call:  Medication or medication refill:    Do you use a [facility]?  Name of the pharmacy and phone number for the current request:  [facility] [address] [place] [address]    Name of the medication requested: HYDROcodone-acetaminophen (NORCO)     Other request: NO    Can we leave a detailed message on this number? YES    Phone number patient can be reached at: Home number on file [phone] (home)    Best Time: ASAP    Call taken on 7/2/2020 at 9:14 AM by Venita Hall

## 2020-07-02 NOTE — TELEPHONE ENCOUNTER
Requested Prescriptions   Pending Prescriptions Disp Refills     HYDROcodone-acetaminophen (NORCO) 5-325 MG tablet 30 tablet 0     Sig: Take 1 tablet by mouth every 6 hours as needed for pain       There is no refill protocol information for this order        Routing refill request to provider for review/approval because:  Drug not on the [facility] refill protocol   Cathy Rogel RN,BSN  [facility]

## 2020-07-06 ENCOUNTER — ANTICOAGULATION THERAPY VISIT (OUTPATIENT)
Dept: NURSING | Facility: CLINIC | Age: 75
End: 2020-07-06

## 2020-07-06 DIAGNOSIS — D68.51 FACTOR V LEIDEN MUTATION (H): ICD-10-CM

## 2020-07-06 DIAGNOSIS — Z79.01 LONG TERM CURRENT USE OF ANTICOAGULANT THERAPY: ICD-10-CM

## 2020-07-06 DIAGNOSIS — D68.9 COAGULATION DEFECT (H): ICD-10-CM

## 2020-07-06 LAB — INR PPP: 2.5 (ref 0.86–1.14)

## 2020-07-06 PROCEDURE — 99207 ZZC NO CHARGE NURSE ONLY: CPT | Performed by: INTERNAL MEDICINE

## 2020-07-06 PROCEDURE — 85610 PROTHROMBIN TIME: CPT | Performed by: INTERNAL MEDICINE

## 2020-07-06 PROCEDURE — 36416 COLLJ CAPILLARY BLOOD SPEC: CPT | Performed by: INTERNAL MEDICINE

## 2020-07-06 NOTE — PROGRESS NOTES
ANTICOAGULATION FOLLOW-UP CLINIC VISIT    Patient Name:  Oh Solares  Date:  2020  Contact Type:  Telephone    SUBJECTIVE:  Patient Findings     Comments:     Assessed for S/S bleeding, clotting, medication, diet, health, activity and alcohol changes          Clinical Outcomes     Negatives:   Major bleeding event, Thromboembolic event, Anticoagulation-related hospital admission, Anticoagulation-related ED visit, Anticoagulation-related fatality    Comments:     Assessed for S/S bleeding, clotting, medication, diet, health, activity and alcohol changes             OBJECTIVE    Recent labs: (last 7 days)     20  1000   INR 2.50*       ASSESSMENT / PLAN  INR assessment THER    Recheck INR In: 6 WEEKS    INR Location Outside lab      Anticoagulation Summary  As of 2020    INR goal:   2.0-3.0   TTR:   66.8 % (1 y)   INR used for dosin.50 (2020)   Warfarin maintenance plan:   7.5 mg (5 mg x 1.5) every Mon; 5 mg (5 mg x 1) all other days   Full warfarin instructions:   7.5 mg every Mon; 5 mg all other days   Weekly warfarin total:   37.5 mg   No change documented:   Ledy Mccall RN   Plan last modified:   Zulema Genao RN (2020)   Next INR check:   2020   Priority:   Maintenance   Target end date:   Indefinite    Indications    Coagulation defect (HCC) [D68.9] [D68.9]  Factor V Leiden mutation (H) [D68.51]             Anticoagulation Episode Summary     INR check location:       Preferred lab:       Send INR reminders to:   [facility]    Comments:         Anticoagulation Care Providers     Provider Role Specialty Phone number    Brendon Kay MD  Internal Medicine [phone]            See the Encounter Report to view Anticoagulation Flowsheet and Dosing Calendar (Go to Encounters tab in chart review, and find the Anticoagulation Therapy Visit)    Dosage adjustment made based on physician directed care plan.    Ledy Mccall RN

## 2020-07-15 DIAGNOSIS — F51.01 PRIMARY INSOMNIA: ICD-10-CM

## 2020-07-16 NOTE — TELEPHONE ENCOUNTER
Ambien      Last Written Prescription Date:  6/15/2020  Last Fill Quantity: 31,   # refills: 0  Last Office Visit: 9/16/19  Future Office visit:       Routing refill request to provider for review/approval because:  Drug not on the [facility], [facility] or [facility] refill protocol or controlled substance

## 2020-07-20 RX ORDER — ZOLPIDEM TARTRATE 10 MG/1
TABLET ORAL
Qty: 31 TABLET | Refills: 0 | Status: SHIPPED | OUTPATIENT
Start: 2020-07-20 | End: 2020-08-20

## 2020-08-11 DIAGNOSIS — F41.9 ANXIETY: ICD-10-CM

## 2020-08-11 DIAGNOSIS — Z79.01 LONG TERM CURRENT USE OF ANTICOAGULANT THERAPY: ICD-10-CM

## 2020-08-14 RX ORDER — WARFARIN SODIUM 5 MG/1
TABLET ORAL
Qty: 100 TABLET | Refills: 0 | Status: SHIPPED | OUTPATIENT
Start: 2020-08-14 | End: 2020-10-16

## 2020-08-14 RX ORDER — ALPRAZOLAM 0.25 MG
TABLET ORAL
Qty: 30 TABLET | Refills: 0 | Status: SHIPPED | OUTPATIENT
Start: 2020-08-14 | End: 2020-10-21

## 2020-08-14 NOTE — TELEPHONE ENCOUNTER
"Requested Prescriptions   Pending Prescriptions Disp Refills    ALPRAZolam (XANAX) 0.25 MG tablet [Pharmacy Med Name: ALPRAZOLAM 0.25 MG TABLET] 30 tablet 0     Sig: TAKE 1 TABLET BY MOUTH THREE TIMES A DAY AS NEEDED FOR ANXIETY       There is no refill protocol information for this order       warfarin ANTICOAGULANT (COUMADIN) 5 MG tablet [Pharmacy Med Name: WARFARIN SODIUM 5 MG TABLET] 100 tablet 0     Sig: Take 7.5 mg every Mon and  5 mg all other days or as directed by the INR clinic       Vitamin K Antagonists Failed - 8/11/2020  6:49 PM        Failed - INR is within goal in the past 6 weeks     Confirm INR is within goal in the past 6 weeks.     Recent Labs   Lab Test 07/06/20  1000   INR 2.50*                       Passed - Recent (12 mo) or future (30 days) visit within the authorizing provider's specialty     Patient has had an office visit with the authorizing provider or a provider within the authorizing providers department within the previous 12 mos or has a future within next 30 days. See \"Patient Info\" tab in inbasket, or \"Choose Columns\" in Meds & Orders section of the refill encounter.              Passed - Medication is active on med list        Passed - Patient is 18 years of age or older           Routing refill request to provider for review/approval because:  Drug not on the [facility] refill protocol   Cathy Rogel RN,BSN  [facility]          "

## 2020-08-17 ENCOUNTER — ANTICOAGULATION THERAPY VISIT (OUTPATIENT)
Dept: NURSING | Facility: CLINIC | Age: 75
End: 2020-08-17

## 2020-08-17 DIAGNOSIS — D68.51 FACTOR V LEIDEN MUTATION (H): ICD-10-CM

## 2020-08-17 DIAGNOSIS — Z79.01 LONG TERM CURRENT USE OF ANTICOAGULANT THERAPY: ICD-10-CM

## 2020-08-17 DIAGNOSIS — D68.9 COAGULATION DEFECT (H): ICD-10-CM

## 2020-08-17 LAB
CAPILLARY BLOOD COLLECTION: NORMAL
INR PPP: 5.4 (ref 0.86–1.14)

## 2020-08-17 PROCEDURE — 36416 COLLJ CAPILLARY BLOOD SPEC: CPT | Performed by: INTERNAL MEDICINE

## 2020-08-17 PROCEDURE — 85610 PROTHROMBIN TIME: CPT | Performed by: INTERNAL MEDICINE

## 2020-08-17 NOTE — PROGRESS NOTES
ANTICOAGULATION FOLLOW-UP CLINIC VISIT    Patient Name:  Oh Solares  Date:  2020  Contact Type:  Telephone  Had been taking tylenol past 2 weeks for hip pain and is off now feeling better also feels like he has eaten less greens. Will hold warfarin next 2 days and recheck in one week. S/s reviewed of any bleeding or bruising.  SUBJECTIVE:  Patient Findings     Positives:   Change in alcohol use, Change in medications (taking tylenol past 2 weeks. )    Comments:   .inr        Clinical Outcomes     Negatives:   Major bleeding event, Thromboembolic event, Anticoagulation-related hospital admission, Anticoagulation-related ED visit, Anticoagulation-related fatality    Comments:   .inr           OBJECTIVE    Recent labs: (last 7 days)     20  1106   INR 5.40*       ASSESSMENT / PLAN  No question data found.  Anticoagulation Summary  As of 2020    INR goal:   2.0-3.0   TTR:   63.5 % (1 y)   INR used for dosin.40! (2020)   Warfarin maintenance plan:   7.5 mg (5 mg x 1.5) every Mon; 5 mg (5 mg x 1) all other days   Full warfarin instructions:   : Hold; : Hold; Otherwise 7.5 mg every Mon; 5 mg all other days   Weekly warfarin total:   37.5 mg   Plan last modified:   Zulema Genao RN (2020)   Next INR check:   2020   Priority:   Maintenance   Target end date:   Indefinite    Indications    Coagulation defect (HCC) [D68.9] [D68.9]  Factor V Leiden mutation (H) [D68.51]             Anticoagulation Episode Summary     INR check location:       Preferred lab:       Send INR reminders to:   [facility]    Comments:         Anticoagulation Care Providers     Provider Role Specialty Phone number    Brendon Kay MD  Internal Medicine [phone]            See the Encounter Report to view Anticoagulation Flowsheet and Dosing Calendar (Go to Encounters tab in chart review, and find the Anticoagulation Therapy Visit)    Some signs and symptoms of bleeding  include: Nose bleed or cut that does not stop bleeding in 10 minutes, bleeding of the gums, vomiting (will look like coffee grounds) or coughing up blood, unusual, easy or large areas of bruising, increased or unexpected  Vaginal bleeding or increased menstrual flow, red or black stools, red or orange urine, prolonged or severe headache, pale skin, unusual or constant tiredness.  If you have these please call 911 or seek medical care immediately.       Ledy Mccall, RN

## 2020-08-19 DIAGNOSIS — F51.01 PRIMARY INSOMNIA: ICD-10-CM

## 2020-08-20 RX ORDER — ZOLPIDEM TARTRATE 10 MG/1
TABLET ORAL
Qty: 31 TABLET | Refills: 0 | Status: SHIPPED | OUTPATIENT
Start: 2020-08-20 | End: 2020-09-15

## 2020-08-20 NOTE — TELEPHONE ENCOUNTER
Requested Prescriptions   Pending Prescriptions Disp Refills     zolpidem (AMBIEN) 10 MG tablet [Pharmacy Med Name: ZOLPIDEM TARTRATE 10 MG TABLET] 31 tablet 0     Sig: TAKE 1 TABLET BY MOUTH EVERY DAY AT BEDTIME AS NEEDED       There is no refill protocol information for this order        Last Written Prescription Date:  7/20/2020  Last Fill Quantity: 31,  # refills: 0   Last office visit: 9/16/2019 with prescribing provider:  Teresa   Future Office Visit:              Routing refill request to provider for review/approval because:  Drug not on the [facility] refill protocol       Karlee Mcclendon RN  Triage Nurse  [facility]  Appointment line: [phone]  [facility], 24 hour nurse line, available by calling clinic at [phone] and following prompts.

## 2020-08-24 ENCOUNTER — TELEPHONE (OUTPATIENT)
Dept: FAMILY MEDICINE | Facility: CLINIC | Age: 75
End: 2020-08-24

## 2020-08-24 ENCOUNTER — ANTICOAGULATION THERAPY VISIT (OUTPATIENT)
Dept: NURSING | Facility: CLINIC | Age: 75
End: 2020-08-24

## 2020-08-24 DIAGNOSIS — D68.9 COAGULATION DEFECT (H): ICD-10-CM

## 2020-08-24 DIAGNOSIS — Z79.01 LONG TERM CURRENT USE OF ANTICOAGULANT THERAPY: Primary | ICD-10-CM

## 2020-08-24 DIAGNOSIS — D68.51 FACTOR V LEIDEN MUTATION (H): ICD-10-CM

## 2020-08-24 DIAGNOSIS — Z79.01 LONG TERM CURRENT USE OF ANTICOAGULANT THERAPY: ICD-10-CM

## 2020-08-24 LAB
CAPILLARY BLOOD COLLECTION: NORMAL
INR PPP: 1.5 (ref 0.86–1.14)

## 2020-08-24 PROCEDURE — 36416 COLLJ CAPILLARY BLOOD SPEC: CPT | Performed by: INTERNAL MEDICINE

## 2020-08-24 PROCEDURE — 85610 PROTHROMBIN TIME: CPT | Performed by: INTERNAL MEDICINE

## 2020-08-24 NOTE — PROGRESS NOTES
ANTICOAGULATION FOLLOW-UP CLINIC VISIT    Patient Name:  Oh Solares  Date:  2020  Contact Type:  Telephone  Over corrected a high inr with hold and then ate too many greens. Small increase and back on maintenance   SUBJECTIVE:  Patient Findings     Comments:     Assessed for S/S bleeding, clotting, medication, diet, health, activity and alcohol changes          Clinical Outcomes     Negatives:   Major bleeding event, Thromboembolic event, Anticoagulation-related hospital admission, Anticoagulation-related ED visit, Anticoagulation-related fatality    Comments:     Assessed for S/S bleeding, clotting, medication, diet, health, activity and alcohol changes             OBJECTIVE    Recent labs: (last 7 days)     20  1105   INR 1.50*       ASSESSMENT / PLAN  INR assessment SUB    Recheck INR In: 1 WEEK    INR Location Clinic      Anticoagulation Summary  As of 2020    INR goal:   2.0-3.0   TTR:   62.1 % (1 y)   INR used for dosin.50! (2020)   Warfarin maintenance plan:   7.5 mg (5 mg x 1.5) every Mon; 5 mg (5 mg x 1) all other days   Full warfarin instructions:   : 10 mg; Otherwise 7.5 mg every Mon; 5 mg all other days   Weekly warfarin total:   37.5 mg   Plan last modified:   Zulema Genao, RN (2020)   Next INR check:   2020   Priority:   Maintenance   Target end date:   Indefinite    Indications    Coagulation defect (HCC) [D68.9] [D68.9]  Factor V Leiden mutation (H) [D68.51]  Long-term (current) use of anticoagulants [Z79.01] [Z79.01]             Anticoagulation Episode Summary     INR check location:       Preferred lab:       Send INR reminders to:   [facility]    Comments:         Anticoagulation Care Providers     Provider Role Specialty Phone number    Karina Galvez PA-C Referring Family Practice [phone]    Brendon Kay MD  Internal Medicine [phone]            See the Encounter Report to view Anticoagulation Flowsheet and  Dosing Calendar (Go to Encounters tab in chart review, and find the Anticoagulation Therapy Visit)    Some signs and symptoms of clots include: pain or tenderness in arm or leg, swelling in arm or leg, changes in skin color, or area is warm to touch, shortness or breath, trouble breathing.  Numbness or weakness especially on 1 side of the body, sudden trouble speaking or swallowing, sudden trouble seeing, sudden confusion, dizzy spells or headache.  If you have these please call 911 or seek medical care immediately.      Ledy Mccall RN

## 2020-08-31 ENCOUNTER — ANTICOAGULATION THERAPY VISIT (OUTPATIENT)
Dept: NURSING | Facility: CLINIC | Age: 75
End: 2020-08-31

## 2020-08-31 DIAGNOSIS — D68.9 COAGULATION DEFECT (H): ICD-10-CM

## 2020-08-31 DIAGNOSIS — Z79.01 LONG TERM CURRENT USE OF ANTICOAGULANT THERAPY: ICD-10-CM

## 2020-08-31 DIAGNOSIS — D68.51 FACTOR V LEIDEN MUTATION (H): ICD-10-CM

## 2020-08-31 LAB
CAPILLARY BLOOD COLLECTION: NORMAL
INR PPP: 2.9 (ref 0.86–1.14)

## 2020-08-31 PROCEDURE — 99207 ZZC NO CHARGE NURSE ONLY: CPT | Performed by: INTERNAL MEDICINE

## 2020-08-31 PROCEDURE — 85610 PROTHROMBIN TIME: CPT | Performed by: INTERNAL MEDICINE

## 2020-08-31 PROCEDURE — 36416 COLLJ CAPILLARY BLOOD SPEC: CPT | Performed by: INTERNAL MEDICINE

## 2020-08-31 NOTE — PROGRESS NOTES
ANTICOAGULATION FOLLOW-UP CLINIC VISIT    Patient Name:  Oh Solares  Date:  2020  Contact Type:  Telephone    SUBJECTIVE:  Patient Findings         Clinical Outcomes     Negatives:   Major bleeding event, Thromboembolic event, Anticoagulation-related hospital admission, Anticoagulation-related ED visit, Anticoagulation-related fatality           OBJECTIVE    Recent labs: (last 7 days)     20  1050   INR 2.90*       ASSESSMENT / PLAN  INR assessment THER    Recheck INR In: 2 WEEKS    INR Location Clinic      Anticoagulation Summary  As of 2020    INR goal:   2.0-3.0   TTR:   61.4 % (1 y)   INR used for dosin.90 (2020)   Warfarin maintenance plan:   7.5 mg (5 mg x 1.5) every Mon; 5 mg (5 mg x 1) all other days   Full warfarin instructions:   7.5 mg every Mon; 5 mg all other days   Weekly warfarin total:   37.5 mg   No change documented:   Gin Anders RN   Plan last modified:   Zulema Genao RN (2020)   Next INR check:   2020   Priority:   Maintenance   Target end date:   Indefinite    Indications    Coagulation defect (HCC) [D68.9] [D68.9]  Factor V Leiden mutation (H) [D68.51]  Long-term (current) use of anticoagulants [Z79.01] [Z79.01]             Anticoagulation Episode Summary     INR check location:       Preferred lab:       Send INR reminders to:   [facility]    Comments:         Anticoagulation Care Providers     Provider Role Specialty Phone number    Karina Galvez PA-C Referring [facility] [phone]    Brendon Kay MD  Internal Medicine [phone]            See the Encounter Report to view Anticoagulation Flowsheet and Dosing Calendar (Go to Encounters tab in chart review, and find the Anticoagulation Therapy Visit)    Therapeutic INR 2.9. Will continue maintenance dose and recheck in 2 week(s).    Patient states negative for signs and symptoms of bleeding or blood clots, changes in medication, changes in diet,  any signs of infection or antibiotic use, anything new OTC or herbal medications, any missed or extra doses of the warfarin.    Patient informed of the symptoms to be seen for either by INR nurse or ER.    Patient ask if refill of warfarin is needed. Rx sent no    Gin Anders RN

## 2020-09-01 ENCOUNTER — TELEPHONE (OUTPATIENT)
Dept: FAMILY MEDICINE | Facility: CLINIC | Age: 75
End: 2020-09-01

## 2020-09-01 DIAGNOSIS — M54.50 CHRONIC BILATERAL LOW BACK PAIN WITHOUT SCIATICA: ICD-10-CM

## 2020-09-01 DIAGNOSIS — G89.29 CHRONIC BILATERAL LOW BACK PAIN WITHOUT SCIATICA: ICD-10-CM

## 2020-09-01 NOTE — TELEPHONE ENCOUNTER
Reason for Call:  Other     Detailed comments: please send a prescription for HYDROcodone-acetaminophen (NORCO)  Please send to [facility] [id number] [facility] - [place], [place] - [address]     Phone Number Patient can be reached at: Home number on file [phone] (home)    Best Time: any    Can we leave a detailed message on this number? YES    Call taken on 9/1/2020 at 8:02 AM by Nicole Correia

## 2020-09-01 NOTE — TELEPHONE ENCOUNTER
Requested Prescriptions   Pending Prescriptions Disp Refills     HYDROcodone-acetaminophen (NORCO) 5-325 MG tablet 30 tablet 0     Sig: Take 1 tablet by mouth every 6 hours as needed for pain       There is no refill protocol information for this order        Last Written Prescription Date:  7/2/2020  Last Fill Quantity: 30,  # refills: 0   Last office visit: 9/16/2019 with prescribing provider:     Future Office Visit:   Next 5 appointments (look out 90 days)    Sep 28, 2020  8:20 AM CDT  [facility] [address] [place] [address] [phone]               Routing refill request to provider for review/approval because:  Drug not on the [facility] refill protocol       Karlee Mcclendon RN  Triage Nurse  [facility]  Appointment line: [phone]  [facility], 24 hour nurse line, available by calling clinic at [phone] and following prompts.

## 2020-09-02 RX ORDER — HYDROCODONE BITARTRATE AND ACETAMINOPHEN 5; 325 MG/1; MG/1
1 TABLET ORAL EVERY 6 HOURS PRN
Qty: 30 TABLET | Refills: 0 | Status: SHIPPED | OUTPATIENT
Start: 2020-09-02 | End: 2020-10-02

## 2020-09-13 DIAGNOSIS — F51.01 PRIMARY INSOMNIA: ICD-10-CM

## 2020-09-14 ENCOUNTER — ANTICOAGULATION THERAPY VISIT (OUTPATIENT)
Dept: NURSING | Facility: CLINIC | Age: 75
End: 2020-09-14

## 2020-09-14 DIAGNOSIS — D68.51 FACTOR V LEIDEN MUTATION (H): ICD-10-CM

## 2020-09-14 DIAGNOSIS — Z79.01 LONG TERM CURRENT USE OF ANTICOAGULANT THERAPY: ICD-10-CM

## 2020-09-14 DIAGNOSIS — D68.9 COAGULATION DEFECT (H): ICD-10-CM

## 2020-09-14 LAB
CAPILLARY BLOOD COLLECTION: NORMAL
INR PPP: 4 (ref 0.86–1.14)

## 2020-09-14 PROCEDURE — 36416 COLLJ CAPILLARY BLOOD SPEC: CPT | Performed by: INTERNAL MEDICINE

## 2020-09-14 PROCEDURE — 85610 PROTHROMBIN TIME: CPT | Performed by: INTERNAL MEDICINE

## 2020-09-14 NOTE — TELEPHONE ENCOUNTER
Requested Prescriptions   Pending Prescriptions Disp Refills    zolpidem (AMBIEN) 10 MG tablet [Pharmacy Med Name: ZOLPIDEM TARTRATE 10 MG TABLET] 31 tablet 0     Sig: TAKE 1 TABLET BY MOUTH EVERY DAY AT BEDTIME AS NEEDED       There is no refill protocol information for this order        Routing refill request to provider for review/approval because:  Drug not on the [facility] refill protocol   Cathy Rogel RN,BSN  [facility]

## 2020-09-14 NOTE — PROGRESS NOTES
ANTICOAGULATION FOLLOW-UP CLINIC VISIT    Patient Name:  Oh Solares  Date:  9/14/2020  Contact Type:  Telephone  Having back pain taking norco. Also eating less greens. Already took todays dose so will hold tomorrow.  SUBJECTIVE:  Patient Findings     Positives:   Change in medications (taking norco for back pain), Change in diet/appetite (eating less greens)    Comments:     Assessed for S/S bleeding, clotting, medication, diet, health, activity and alcohol changes          Clinical Outcomes     Negatives:   Major bleeding event, Thromboembolic event, Anticoagulation-related hospital admission, Anticoagulation-related ED visit, Anticoagulation-related fatality    Comments:     Assessed for S/S bleeding, clotting, medication, diet, health, activity and alcohol changes             OBJECTIVE    Recent labs: (last 7 days)     09/14/20  0928   INR 4.00*       ASSESSMENT / PLAN  INR assessment SUPRA    Recheck INR In: 1 WEEK    INR Location Clinic      Anticoagulation Summary  As of 9/14/2020    INR goal:   2.0-3.0   TTR:   57.9 % (1 y)   INR used for dosing:      Warfarin maintenance plan:   7.5 mg (5 mg x 1.5) every Mon; 5 mg (5 mg x 1) all other days   Full warfarin instructions:   9/15: Hold; Otherwise 7.5 mg every Mon; 5 mg all other days   Weekly warfarin total:   37.5 mg   Plan last modified:   Zulema Genao RN (5/28/2020)   Next INR check:   9/21/2020   Priority:   Maintenance   Target end date:   Indefinite    Indications    Coagulation defect (HCC) [D68.9] [D68.9]  Factor V Leiden mutation (H) [D68.51]  Long-term (current) use of anticoagulants [Z79.01] [Z79.01]             Anticoagulation Episode Summary     INR check location:       Preferred lab:       Send INR reminders to:   [facility]    Comments:         Anticoagulation Care Providers     Provider Role Specialty Phone number    Karina Galvez PA-C Referring Family Practice [phone]    Brendon Kay MD  Internal  Medicine [phone]            See the Encounter Report to view Anticoagulation Flowsheet and Dosing Calendar (Go to Encounters tab in chart review, and find the Anticoagulation Therapy Visit)        Ledy Mccall RN

## 2020-09-15 RX ORDER — ZOLPIDEM TARTRATE 10 MG/1
TABLET ORAL
Qty: 31 TABLET | Refills: 0 | Status: SHIPPED | OUTPATIENT
Start: 2020-09-15 | End: 2020-10-13

## 2020-09-18 ENCOUNTER — DOCUMENTATION ONLY (OUTPATIENT)
Dept: FAMILY MEDICINE | Facility: CLINIC | Age: 75
End: 2020-09-18

## 2020-09-18 DIAGNOSIS — D68.9 COAGULATION DEFECT (H): ICD-10-CM

## 2020-09-18 DIAGNOSIS — E78.5 HYPERLIPIDEMIA LDL GOAL <100: Primary | ICD-10-CM

## 2020-09-18 NOTE — PROGRESS NOTES
This patient is coming for a lab work on(9.59.20 @0745) please can you place the orders thank you.

## 2020-09-21 ENCOUNTER — ANTICOAGULATION THERAPY VISIT (OUTPATIENT)
Dept: NURSING | Facility: CLINIC | Age: 75
End: 2020-09-21

## 2020-09-21 DIAGNOSIS — D68.9 COAGULATION DEFECT (H): ICD-10-CM

## 2020-09-21 DIAGNOSIS — D68.51 FACTOR V LEIDEN MUTATION (H): ICD-10-CM

## 2020-09-21 DIAGNOSIS — E78.5 HYPERLIPIDEMIA LDL GOAL <100: ICD-10-CM

## 2020-09-21 DIAGNOSIS — Z79.01 LONG TERM CURRENT USE OF ANTICOAGULANT THERAPY: ICD-10-CM

## 2020-09-21 LAB
CAPILLARY BLOOD COLLECTION: NORMAL
INR PPP: 2.8 (ref 0.86–1.14)

## 2020-09-21 PROCEDURE — 85610 PROTHROMBIN TIME: CPT | Performed by: INTERNAL MEDICINE

## 2020-09-21 PROCEDURE — 85025 COMPLETE CBC W/AUTO DIFF WBC: CPT | Performed by: INTERNAL MEDICINE

## 2020-09-21 PROCEDURE — 80061 LIPID PANEL: CPT | Performed by: INTERNAL MEDICINE

## 2020-09-21 PROCEDURE — 36416 COLLJ CAPILLARY BLOOD SPEC: CPT | Performed by: INTERNAL MEDICINE

## 2020-09-21 PROCEDURE — 80048 BASIC METABOLIC PNL TOTAL CA: CPT | Performed by: INTERNAL MEDICINE

## 2020-09-21 NOTE — PROGRESS NOTES
Anticoagulation Management    Unable to reach Oh today.    Today's INR result of 2.8 is therapeutic (goal INR of 2.0-3.0).  Result received from: Clinic Lab    Follow up required to confirm warfarin dose taken and assess for changes    Left message to call [phone]     Tentative plan: continue maintenance dose and recheck INR in 2 weeks.      Anticoagulation clinic to follow up    Danay Pradhan RN

## 2020-09-21 NOTE — PROGRESS NOTES
ANTICOAGULATION MANAGEMENT     Patient Name:  Oh Solares  Date:  2020    ASSESSMENT /SUBJECTIVE:    Today's INR result of 2.8 is therapeutic. Goal INR of 2.0-3.0      Warfarin dose taken: Warfarin taken as previously instructed    Diet: No new diet changes affecting INR    Medication changes/ interactions: No new medications/supplements affecting INR    Previous INR: Supratherapeutic     S/S of bleeding or thromboembolism: No    New injury or illness: No    Upcoming surgery, procedure or cardioversion: No    Additional findings: None      PLAN:    Spoke with Oh regarding INR result and instructed:     Warfarin Dosing Instructions: Continue your current warfarin dose 7.5 mg Mon and 5 mg ROW    Instructed patient to follow up no later than: 2 weeks  Lab visit scheduled    Education provided: Target INR goal and significance of current INR result      Oh verbalizes understanding and agrees to warfarin dosing plan.    Instructed to call the Anticoagulation Clinic for any changes, questions or concerns. (#[phone])        Danay Pradhan RN      OBJECTIVE:  Recent labs: (last 7 days)     20  0742   INR 2.80*         No question data found.  Anticoagulation Summary  As of 2020    INR goal:   2.0-3.0   TTR:   56.4 % (1 y)   INR used for dosin.80 (2020)   Warfarin maintenance plan:   7.5 mg (5 mg x 1.5) every Mon; 5 mg (5 mg x 1) all other days   Full warfarin instructions:   7.5 mg every Mon; 5 mg all other days   Weekly warfarin total:   37.5 mg   No change documented:   Danay Pradhan RN   Plan last modified:   Zulema Genao RN (2020)   Next INR check:   10/5/2020   Priority:   Maintenance   Target end date:   Indefinite    Indications    Coagulation defect (HCC) [D68.9] [D68.9]  Factor V Leiden mutation (H) [D68.51]  Long-term (current) use of anticoagulants [Z79.01] [Z79.01]             Anticoagulation Episode Summary     INR check location:       Preferred lab:        Send INR reminders to:   [facility]    Comments:         Anticoagulation Care Providers     Provider Role Specialty Phone number    Karina Galvez PA-C Referring Family Practice [phone]    Brendon Kay MD  Internal Medicine [phone]

## 2020-09-22 LAB
ANION GAP SERPL CALCULATED.3IONS-SCNC: 6 MMOL/L (ref 3–14)
BASOPHILS # BLD AUTO: 0 10E9/L (ref 0–0.2)
BASOPHILS NFR BLD AUTO: 0.7 %
BUN SERPL-MCNC: 12 MG/DL (ref 7–30)
CALCIUM SERPL-MCNC: 8.5 MG/DL (ref 8.5–10.1)
CHLORIDE SERPL-SCNC: 109 MMOL/L (ref 94–109)
CHOLEST SERPL-MCNC: 203 MG/DL
CO2 SERPL-SCNC: 28 MMOL/L (ref 20–32)
CREAT SERPL-MCNC: 1.01 MG/DL (ref 0.66–1.25)
DIFFERENTIAL METHOD BLD: NORMAL
EOSINOPHIL # BLD AUTO: 0.4 10E9/L (ref 0–0.7)
EOSINOPHIL NFR BLD AUTO: 7.1 %
ERYTHROCYTE [DISTWIDTH] IN BLOOD BY AUTOMATED COUNT: 14.8 % (ref 10–15)
GFR SERPL CREATININE-BSD FRML MDRD: 73 ML/MIN/{1.73_M2}
GLUCOSE SERPL-MCNC: 100 MG/DL (ref 70–99)
HCT VFR BLD AUTO: 43.8 % (ref 40–53)
HDLC SERPL-MCNC: 88 MG/DL
HGB BLD-MCNC: 14.6 G/DL (ref 13.3–17.7)
LDLC SERPL CALC-MCNC: 98 MG/DL
LYMPHOCYTES # BLD AUTO: 1.5 10E9/L (ref 0.8–5.3)
LYMPHOCYTES NFR BLD AUTO: 26.2 %
MCH RBC QN AUTO: 29.7 PG (ref 26.5–33)
MCHC RBC AUTO-ENTMCNC: 33.3 G/DL (ref 31.5–36.5)
MCV RBC AUTO: 89 FL (ref 78–100)
MONOCYTES # BLD AUTO: 0.7 10E9/L (ref 0–1.3)
MONOCYTES NFR BLD AUTO: 12.4 %
NEUTROPHILS # BLD AUTO: 3 10E9/L (ref 1.6–8.3)
NEUTROPHILS NFR BLD AUTO: 53.6 %
NONHDLC SERPL-MCNC: 115 MG/DL
PLATELET # BLD AUTO: 201 10E9/L (ref 150–450)
POTASSIUM SERPL-SCNC: 4.1 MMOL/L (ref 3.4–5.3)
RBC # BLD AUTO: 4.91 10E12/L (ref 4.4–5.9)
SODIUM SERPL-SCNC: 143 MMOL/L (ref 133–144)
TRIGL SERPL-MCNC: 86 MG/DL
WBC # BLD AUTO: 5.7 10E9/L (ref 4–11)

## 2020-09-28 ENCOUNTER — OFFICE VISIT (OUTPATIENT)
Dept: FAMILY MEDICINE | Facility: CLINIC | Age: 75
End: 2020-09-28
Payer: COMMERCIAL

## 2020-09-28 VITALS
BODY MASS INDEX: 23.78 KG/M2 | HEART RATE: 69 BPM | OXYGEN SATURATION: 98 % | HEIGHT: 66 IN | DIASTOLIC BLOOD PRESSURE: 68 MMHG | WEIGHT: 148 LBS | TEMPERATURE: 97.9 F | SYSTOLIC BLOOD PRESSURE: 132 MMHG

## 2020-09-28 DIAGNOSIS — Z00.00 ENCOUNTER FOR MEDICARE ANNUAL WELLNESS EXAM: Primary | ICD-10-CM

## 2020-09-28 PROCEDURE — 99397 PER PM REEVAL EST PAT 65+ YR: CPT | Performed by: INTERNAL MEDICINE

## 2020-09-28 ASSESSMENT — ENCOUNTER SYMPTOMS
COUGH: 0
CHILLS: 0
CONSTIPATION: 0
HEMATURIA: 0
HEMATOCHEZIA: 0
DIZZINESS: 0
ABDOMINAL PAIN: 0
DIARRHEA: 0

## 2020-09-28 ASSESSMENT — ACTIVITIES OF DAILY LIVING (ADL): CURRENT_FUNCTION: NO ASSISTANCE NEEDED

## 2020-09-28 ASSESSMENT — MIFFLIN-ST. JEOR: SCORE: 1359.07

## 2020-09-28 NOTE — PROGRESS NOTES
"SUBJECTIVE:   Oh Solares is a 75 year old male who presents for Preventive Visit.    Patient has been advised of split billing requirements and indicates understanding: Yes   Are you in the first 12 months of your Medicare coverage?  No  Tylenol for pain in the left hip and hurt the area    Healthy Habits:     In general, how would you rate your overall health?  Excellent    Frequency of exercise:  4-5 days/week    Duration of exercise:  Greater than 60 minutes    Do you usually eat at least 4 servings of fruit and vegetables a day, include whole grains    & fiber and avoid regularly eating high fat or \"junk\" foods?  No    Taking medications regularly:  Yes    Medication side effects:  None    Ability to successfully perform activities of daily living:  No assistance needed    Home Safety:  No safety concerns identified    Hearing Impairment:  Difficulty following a conversation in a noisy restaurant or crowded room    In the past 6 months, have you been bothered by leaking of urine?  No    In general, how would you rate your overall mental or emotional health?  Good      PHQ-2 Total Score: 0    Additional concerns today:  Yes    Do you feel safe in your environment? Yes    Have you ever done Advance Care Planning? (For example, a Health Directive, POLST, or a discussion with a medical provider or your loved ones about your wishes): No, advance care planning information given to patient to review.  Patient plans to discuss their wishes with loved ones or provider.      Fall risk  Fallen 2 or more times in the past year?: No  Any fall with injury in the past year?: No  Check back with skin lesions  Not swollen     Cognitive Screening   1) Repeat 3 items (Leader, Season, Table)    2) Clock draw: NORMAL  3) 3 item recall: Recalls 3 objects  Results: 3 items recalled: COGNITIVE IMPAIRMENT LESS LIKELY    Mini-CogTM Copyright ELAINE Mike. Licensed by the author for use in [facility]; reprinted with " permission (eamon@[facility]). All rights reserved.      Do you have sleep apnea, excessive snoring or daytime drowsiness?: yes    Reviewed and updated as needed this visit by clinical staff  Allergies  Meds         Reviewed and updated as needed this visit by Provider        Social History     Tobacco Use     Smoking status: Former Smoker     Last attempt to quit: 4/15/1984     Years since quittin.4     Smokeless tobacco: Never Used   Substance Use Topics     Alcohol use: Yes     Comment: 1 beer every  day     If you drink alcohol do you typically have >3 drinks per day or >7 drinks per week? No    Alcohol Use 2020   Prescreen: >3 drinks/day or >7 drinks/week? No   Prescreen: >3 drinks/day or >7 drinks/week? -   No flowsheet data found.        Current providers sharing in care for this patient include:   Patient Care Team:  Brendon Kay MD as PCP - General  Brendon Kay MD as Assigned PCP    The following health maintenance items are reviewed in Epic and correct as of today:  Health Maintenance   Topic Date Due     ZOSTER IMMUNIZATION (2 of 3) 2010     PHQ-2  2020     URINE DRUG SCREEN  2020     FALL RISK ASSESSMENT  2020     MEDICARE ANNUAL WELLNESS VISIT  2020     ADVANCE CARE PLANNING  2022     DTAP/TDAP/TD IMMUNIZATION (3 - Td) 2024     COLORECTAL CANCER SCREENING  2024     LIPID  2025     HEPATITIS C SCREENING  Completed     INFLUENZA VACCINE  Completed     PNEUMOCOCCAL IMMUNIZATION 65+ LOW/MEDIUM RISK  Completed     AORTIC ANEURYSM SCREENING (SYSTEM ASSIGNED)  Completed     IPV IMMUNIZATION  Aged Out     MENINGITIS IMMUNIZATION  Aged Out     HEPATITIS B IMMUNIZATION  Aged Out     Lab work is in process  Labs reviewed in EPIC  BP Readings from Last 3 Encounters:   20 132/68   19 138/87   18 128/64    Wt Readings from Last 3 Encounters:   20 67.1 kg (148 lb)   19 66.2 kg (146 lb)   18 64.9 kg (143 lb)                   Patient Active Problem List   Diagnosis     Factor V Leiden mutation (H)     GI bleed     ED (erectile dysfunction)     CARDIOVASCULAR SCREENING; LDL GOAL LESS THAN 160     Advanced directives, counseling/discussion     Sleep stage dysfunction     Anxiety     Dupuytren's contracture - left     Chronic pain     Long-term (current) use of anticoagulants [Z79.01]     Coagulation defect (HCC) [D68.9]     Chronic pain syndrome     Past Surgical History:   Procedure Laterality Date     C VENOUS THROMBOSIS STUDY       CLOSED RX ELBOW DISLOCATION  96    left     COLECTOMY       COLONOSCOPY       COLONOSCOPY WITH CO2 INSUFFLATION N/A 9/3/2014    Procedure: COLONOSCOPY WITH CO2 INSUFFLATION;  Surgeon: West Jerez MD;  Location: [facility] OR     SMALL BOWEL RESECTION      partial     VASECTOMY         Social History     Tobacco Use     Smoking status: Former Smoker     Last attempt to quit: 4/15/1984     Years since quittin.4     Smokeless tobacco: Never Used   Substance Use Topics     Alcohol use: Yes     Comment: 1 beer every  day     No family history on file.      Current Outpatient Medications   Medication Sig Dispense Refill     ALPRAZolam (XANAX) 0.25 MG tablet TAKE 1 TABLET BY MOUTH THREE TIMES A DAY AS NEEDED FOR ANXIETY 30 tablet 0     HYDROcodone-acetaminophen (NORCO) 5-325 MG tablet Take 1 tablet by mouth every 6 hours as needed for pain 30 tablet 0     Pediatric Multiple Vit-Vit C (VITAMIN DAILY PO) Take 500 mg by mouth daily.       saw palmetto ([employer] SAW PALMETTO) 160 MG CAPS Take 3 capsules by mouth daily. 1 capsule 12     TYLENOL 325 MG OR TABS as needed       warfarin ANTICOAGULANT (COUMADIN) 5 MG tablet TAKE 7.5 MG EVERY MON AND 5 MG ALL OTHER DAYS OR AS DIRECTED BY THE INR CLINIC 100 tablet 0     zolpidem (AMBIEN) 10 MG tablet TAKE 1 TABLET BY MOUTH EVERY DAY AT BEDTIME AS NEEDED 31 tablet 0     Allergies   Allergen Reactions     Penicillins Rash     Recent Labs   Lab Test  "09/21/20  0745 09/09/19  0801 08/20/18  0737   LDL 98 123* 99   HDL 88 77 79   TRIG 86 146 101   CR 1.01 0.94 1.02   GFRESTIMATED 73 80 72   GFRESTBLACK 85 >90 87   POTASSIUM 4.1 4.1 4.3          Review of Systems   Constitutional: Negative for chills.   HENT: Negative for congestion.    Respiratory: Negative for cough.    Cardiovascular: Negative for chest pain.   Gastrointestinal: Negative for abdominal pain, constipation, diarrhea and hematochezia.   Genitourinary: Negative for hematuria.   Neurological: Negative for dizziness.     Constitutional, HEENT, cardiovascular, pulmonary, gi and gu systems are negative, except as otherwise noted.    OBJECTIVE:   There were no vitals taken for this visit. Estimated body mass index is 24.3 kg/m  as calculated from the following:    Height as of 9/16/19: 1.651 m (5' 5\").    Weight as of 9/16/19: 66.2 kg (146 lb).  Physical Exam  GENERAL: healthy, alert and no distress  EYES: Eyes grossly normal to inspection, PERRL and conjunctivae and sclerae normal  HENT: ear canals and TM's normal, nose and mouth without ulcers or lesions  NECK: no adenopathy, no asymmetry, masses, or scars and thyroid normal to palpation  RESP: lungs clear to auscultation - no rales, rhonchi or wheezes  CV: regular rate and rhythm, normal S1 S2, no S3 or S4, no murmur, click or rub, no peripheral edema and peripheral pulses strong  ABDOMEN: soft, nontender, no hepatosplenomegaly, no masses and bowel sounds normal  MS: no gross musculoskeletal defects noted, no edema  SKIN: no suspicious lesions or rashes  NEURO: Normal strength and tone, mentation intact and speech normal  BACK: no CVA tenderness, no paralumbar tenderness  PSYCH: mentation appears normal, affect normal/bright  LYMPH: no cervical, supraclavicular, axillary, or inguinal adenopathy    Diagnostic Test Results:  Labs reviewed in Epic  No results found for any visits on 09/28/20.    ASSESSMENT / PLAN:   Oh was seen today for " "physical.    Diagnoses and all orders for this visit:    Encounter for Medicare annual wellness exam  -     GASTROENTEROLOGY ADULT REF PROCEDURE ONLY; Future        Patient has been advised of split billing requirements and indicates understanding: Yes  COUNSELING:  Reviewed preventive health counseling, as reflected in patient instructions       Consider AAA screening for ages 65-75 and smoking history       Regular exercise       Healthy diet/nutrition       Vision screening       Hearing screening       Dental care       Bladder control       Fall risk prevention       Colon cancer screening    Estimated body mass index is 24.3 kg/m  as calculated from the following:    Height as of 9/16/19: 1.651 m (5' 5\").    Weight as of 9/16/19: 66.2 kg (146 lb).        He reports that he quit smoking about 36 years ago. He has never used smokeless tobacco.      Appropriate preventive services were discussed with this patient, including applicable screening as appropriate for cardiovascular disease, diabetes, osteopenia/osteoporosis, and glaucoma.  As appropriate for age/gender, discussed screening for colorectal cancer, prostate cancer, breast cancer, and cervical cancer. Checklist reviewing preventive services available has been given to the patient.    Reviewed patients plan of care and provided an AVS. The Basic Care Plan (routine screening as documented in Health Maintenance) for Oh meets the Care Plan requirement. This Care Plan has been established and reviewed with the Patient.    ICD-10-CM    1. Encounter for Medicare annual wellness exam  Z00.00 GASTROENTEROLOGY ADULT REF PROCEDURE ONLY     Reviewed immunizations and other preventative optiosn    Counseling Resources:  ATP IV Guidelines  Pooled Cohorts Equation Calculator  Breast Cancer Risk Calculator  Breast Cancer: Medication to Reduce Risk  FRAX Risk Assessment  ICSI Preventive Guidelines  Dietary Guidelines for Americans, 2010  USDA's MyPlate  ASA " Prophylaxis  Lung CA Screening    Brendon Kay MD  [facility]    Identified Health Risks:

## 2020-09-28 NOTE — PATIENT INSTRUCTIONS
Patient Education   Personalized Prevention Plan  You are due for the preventive services outlined below.  Your care team is available to assist you in scheduling these services.  If you have already completed any of these items, please share that information with your care team to update in your medical record.  Health Maintenance Due   Topic Date Due     Zoster (Shingles) Vaccine (2 of 3) 12/27/2010     PHQ-2  01/01/2020     URINE DRUG SCREEN  09/16/2020     FALL RISK ASSESSMENT  09/16/2020     Annual Wellness Visit  09/16/2020

## 2020-10-02 ENCOUNTER — TELEPHONE (OUTPATIENT)
Dept: FAMILY MEDICINE | Facility: CLINIC | Age: 75
End: 2020-10-02

## 2020-10-02 DIAGNOSIS — M54.50 CHRONIC BILATERAL LOW BACK PAIN WITHOUT SCIATICA: ICD-10-CM

## 2020-10-02 DIAGNOSIS — G89.4 CHRONIC PAIN SYNDROME: Primary | ICD-10-CM

## 2020-10-02 DIAGNOSIS — G89.29 CHRONIC BILATERAL LOW BACK PAIN WITHOUT SCIATICA: ICD-10-CM

## 2020-10-02 RX ORDER — HYDROCODONE BITARTRATE AND ACETAMINOPHEN 5; 325 MG/1; MG/1
1 TABLET ORAL EVERY 6 HOURS PRN
Qty: 30 TABLET | Refills: 0 | Status: SHIPPED | OUTPATIENT
Start: 2020-10-02 | End: 2020-12-06

## 2020-10-02 NOTE — TELEPHONE ENCOUNTER
Reason for Call:  Medication or medication refill:    Do you use a [facility]?  Name of the pharmacy and phone number for the current request:  [facility]    Name of the medication requested: HYDROcodone-acetaminophen (NORCO) 5-325 MG tablet    Other request:     Can we leave a detailed message on this number? YES    Phone number patient can be reached at: Cell number on file:    Telephone Information:   Mobile [phone]       Best Time: anytime    Call taken on 10/2/2020 at 10:28 AM by Marguerite Morgan

## 2020-10-05 ENCOUNTER — ANTICOAGULATION THERAPY VISIT (OUTPATIENT)
Dept: NURSING | Facility: CLINIC | Age: 75
End: 2020-10-05

## 2020-10-05 DIAGNOSIS — D68.51 FACTOR V LEIDEN MUTATION (H): ICD-10-CM

## 2020-10-05 DIAGNOSIS — Z79.01 LONG TERM CURRENT USE OF ANTICOAGULANT THERAPY: ICD-10-CM

## 2020-10-05 DIAGNOSIS — D68.9 COAGULATION DEFECT (H): ICD-10-CM

## 2020-10-05 LAB
CAPILLARY BLOOD COLLECTION: NORMAL
INR PPP: 2.2 (ref 0.86–1.14)

## 2020-10-05 PROCEDURE — 36416 COLLJ CAPILLARY BLOOD SPEC: CPT | Performed by: INTERNAL MEDICINE

## 2020-10-05 PROCEDURE — 85610 PROTHROMBIN TIME: CPT | Performed by: INTERNAL MEDICINE

## 2020-10-05 NOTE — PROGRESS NOTES
ANTICOAGULATION MANAGEMENT     Patient Name:  Oh Solares  Date:  10/5/2020    ASSESSMENT /SUBJECTIVE:    Today's INR result of 2.2 is therapeutic. Goal INR of 2.0-3.0      Warfarin dose taken: Warfarin taken as instructed    Diet: No new diet changes affecting INR    Medication changes/ interactions: No new medications/supplements affecting INR    Previous INR: Therapeutic     S/S of bleeding or thromboembolism: No    New injury or illness: No    Upcoming surgery, procedure or cardioversion: No    Additional findings: None      PLAN:    Telephone call with Oh regarding INR result and instructed:     Warfarin Dosing Instructions: Continue your current warfarin dose 7.5 mg Mon and 5 mg ROW    Instructed patient to follow up no later than: 4 weeks  Lab visit scheduled    Education provided: Target INR goal and significance of current INR result      Oh verbalizes understanding and agrees to warfarin dosing plan.    Instructed to call the Anticoagulation Clinic for any changes, questions or concerns. (#[phone])        Danay Pradhan RN      OBJECTIVE:  Recent labs: (last 7 days)     10/05/20  1027   INR 2.20*         No question data found.  Anticoagulation Summary  As of 10/5/2020    INR goal:  2.0-3.0   TTR:  56.4 % (1 y)   INR used for dosin.20 (10/5/2020)   Warfarin maintenance plan:  7.5 mg (5 mg x 1.5) every Mon; 5 mg (5 mg x 1) all other days   Full warfarin instructions:  7.5 mg every Mon; 5 mg all other days   Weekly warfarin total:  37.5 mg   No change documented:  Danay Pradhan RN   Plan last modified:  Zulema Genao RN (2020)   Next INR check:  2020   Priority:  Maintenance   Target end date:  Indefinite    Indications    Coagulation defect (HCC) [D68.9] [D68.9]  Factor V Leiden mutation (H) [D68.51]  Long-term (current) use of anticoagulants [Z79.01] [Z79.01]             Anticoagulation Episode Summary     INR check location:      Preferred lab:      Send INR  reminders to:  [facility]    Comments:        Anticoagulation Care Providers     Provider Role Specialty Phone number    Karina Galvez PA-C Referring Family Practice [phone]    Brendon Kay MD  Internal Medicine [phone]

## 2020-10-11 DIAGNOSIS — Z79.01 LONG TERM CURRENT USE OF ANTICOAGULANT THERAPY: ICD-10-CM

## 2020-10-11 DIAGNOSIS — F51.01 PRIMARY INSOMNIA: ICD-10-CM

## 2020-10-13 NOTE — TELEPHONE ENCOUNTER
"  Requested Prescriptions   Pending Prescriptions Disp Refills     zolpidem (AMBIEN) 10 MG tablet [Pharmacy Med Name: ZOLPIDEM TARTRATE 10 MG TABLET] 31 tablet 0     Sig: TAKE 1 TABLET BY MOUTH EVERY DAY AT BEDTIME AS NEEDED       There is no refill protocol information for this order        warfarin ANTICOAGULANT (COUMADIN) 5 MG tablet [Pharmacy Med Name: WARFARIN SODIUM 5 MG TABLET] 100 tablet 0     Sig: TAKE 7.5 MG EVERY MON AND 5 MG ALL OTHER DAYS OR AS DIRECTED BY THE INR CLINIC       Vitamin K Antagonists Failed - 10/11/2020  8:52 PM        Failed - INR is within goal in the past 6 weeks     Confirm INR is within goal in the past 6 weeks.     Recent Labs   Lab Test 10/05/20  1027   INR 2.20*                       Passed - Recent (12 mo) or future (30 days) visit within the authorizing provider's specialty     Patient has had an office visit with the authorizing provider or a provider within the authorizing providers department within the previous 12 mos or has a future within next 30 days. See \"Patient Info\" tab in inbasket, or \"Choose Columns\" in Meds & Orders section of the refill encounter.              Passed - Medication is active on med list        Passed - Patient is 18 years of age or older             "

## 2020-10-13 NOTE — TELEPHONE ENCOUNTER
Routing refill request to provider for review/approval because:   Not on [facility] protocol          Peg Woodward RN

## 2020-10-14 DIAGNOSIS — Z11.59 ENCOUNTER FOR SCREENING FOR OTHER VIRAL DISEASES: Primary | ICD-10-CM

## 2020-10-16 RX ORDER — WARFARIN SODIUM 5 MG/1
TABLET ORAL
Qty: 100 TABLET | Refills: 0 | Status: SHIPPED | OUTPATIENT
Start: 2020-10-16 | End: 2021-03-11

## 2020-10-16 RX ORDER — ZOLPIDEM TARTRATE 10 MG/1
TABLET ORAL
Qty: 31 TABLET | Refills: 2 | Status: SHIPPED | OUTPATIENT
Start: 2020-10-16 | End: 2021-01-13

## 2020-10-21 DIAGNOSIS — F41.9 ANXIETY: ICD-10-CM

## 2020-10-21 RX ORDER — ALPRAZOLAM 0.25 MG
TABLET ORAL
Qty: 30 TABLET | Refills: 0 | Status: SHIPPED | OUTPATIENT
Start: 2020-10-21 | End: 2020-11-19

## 2020-10-21 NOTE — TELEPHONE ENCOUNTER
Requested Prescriptions   Pending Prescriptions Disp Refills    ALPRAZolam (XANAX) 0.25 MG tablet [Pharmacy Med Name: ALPRAZOLAM 0.25 MG TABLET] 30 tablet 0     Sig: TAKE 1 TABLET BY MOUTH THREE TIMES A DAY AS NEEDED FOR ANXIETY       There is no refill protocol information for this order        Routing refill request to provider for review/approval because:  Drug not on the [facility] refill protocol   Cathy Rogel RN,BSN  [facility]

## 2020-11-02 ENCOUNTER — TELEPHONE (OUTPATIENT)
Dept: FAMILY MEDICINE | Facility: CLINIC | Age: 75
End: 2020-11-02

## 2020-11-02 ENCOUNTER — ANTICOAGULATION THERAPY VISIT (OUTPATIENT)
Dept: NURSING | Facility: CLINIC | Age: 75
End: 2020-11-02

## 2020-11-02 DIAGNOSIS — Z79.01 LONG TERM CURRENT USE OF ANTICOAGULANT THERAPY: ICD-10-CM

## 2020-11-02 DIAGNOSIS — D68.9 COAGULATION DEFECT (H): ICD-10-CM

## 2020-11-02 DIAGNOSIS — D68.51 FACTOR V LEIDEN MUTATION (H): ICD-10-CM

## 2020-11-02 LAB
CAPILLARY BLOOD COLLECTION: NORMAL
INR PPP: 3.6 (ref 0.86–1.14)

## 2020-11-02 PROCEDURE — 36416 COLLJ CAPILLARY BLOOD SPEC: CPT | Performed by: INTERNAL MEDICINE

## 2020-11-02 PROCEDURE — 85610 PROTHROMBIN TIME: CPT | Performed by: INTERNAL MEDICINE

## 2020-11-02 RX ORDER — SODIUM, POTASSIUM,MAG SULFATES 17.5-3.13G
1 SOLUTION, RECONSTITUTED, ORAL ORAL SEE ADMIN INSTRUCTIONS
Qty: 2 BOTTLE | Refills: 0 | Status: SHIPPED | OUTPATIENT
Start: 2020-11-02 | End: 2024-04-08

## 2020-11-02 RX ORDER — BISACODYL 5 MG
5 TABLET, DELAYED RELEASE (ENTERIC COATED) ORAL SEE ADMIN INSTRUCTIONS
Qty: 1 TABLET | Refills: 0 | Status: SHIPPED | OUTPATIENT
Start: 2020-11-02 | End: 2023-11-20

## 2020-11-02 NOTE — PROGRESS NOTES
ANTICOAGULATION MANAGEMENT     Patient Name:  Oh Solares  Date:  11/2/2020    ASSESSMENT /SUBJECTIVE:    Today's INR result of 3.6 is supratherapeutic. Goal INR of 2.0-3.0      Warfarin dose taken: Warfarin taken as instructed    Diet: No new diet changes affecting INR    Medication changes/ interactions: No new medications/supplements affecting INR    Previous INR: Therapeutic     S/S of bleeding or thromboembolism: No    New injury or illness: No    Upcoming surgery, procedure or cardioversion: Yes: Colonoscopy on 11/9    Additional findings: None      PLAN:    Telephone call with Oh regarding INR result and instructed:     Warfarin Dosing Instructions: See ACC    Instructed patient to follow up no later than: 2 weeks  Lab visit scheduled    Education provided: Target INR goal and significance of current INR result      Oh verbalizes understanding and agrees to warfarin dosing plan.    Instructed to call the Anticoagulation Clinic for any changes, questions or concerns. (#[phone])        Danay Pradhan RN      OBJECTIVE:  Recent labs: (last 7 days)     11/02/20  1027   INR 3.60*         No question data found.  Anticoagulation Summary  As of 11/2/2020    INR goal:  2.0-3.0   TTR:  60.0 % (1 y)   INR used for dosing:  3.60 (11/2/2020)   Warfarin maintenance plan:  7.5 mg (5 mg x 1.5) every Mon; 5 mg (5 mg x 1) all other days   Full warfarin instructions:  11/2: Hold; 11/4: Hold; 11/5: Hold; 11/6: Hold; 11/7: Hold; 11/8: Hold; Otherwise 7.5 mg every Mon; 5 mg all other days   Weekly warfarin total:  37.5 mg   Plan last modified:  Zulema Genao, RN (5/28/2020)   Next INR check:  11/16/2020   Priority:  Maintenance   Target end date:  Indefinite    Indications    Coagulation defect (HCC) [D68.9] [D68.9]  Factor V Leiden mutation (H) [D68.51]  Long-term (current) use of anticoagulants [Z79.01] [Z79.01]             Anticoagulation Episode Summary     INR check location:      Preferred lab:       Send INR reminders to:  [facility]    Comments:        Anticoagulation Care Providers     Provider Role Specialty Phone number    Karina Galvez PA-C Referring Internal Medicine - Pediatrics [phone]    Brendon Kay MD  Internal Medicine - Pediatrics [phone]

## 2020-11-02 NOTE — TELEPHONE ENCOUNTER
[facility]ESHAN-PROCEDURAL ANTICOAGULATION  MANAGEMENT    Assessment     Warfarin interruption plan for colonoscopy on 2020.    Factor V Leiden, VTE 2002      Risk stratification for thromboembolism: moderate/high(2012 Chest guidelines)      unable to verify if heterozygous or homozygous factor V Leiden    VTE: 2018 American Society of Hematology recommends against bridging in low and moderate risk VTE patients holding warfarin    Does not look like patient has bridged historically in past  Plan       Pre-Procedure:    Normal 5 day hold warfarin until after procedure startin2020   Due to out of range result, reasonable to start hold 2020     Post-Procedure:    Resume home warfarin dose if okay with provider doing procedure on night of procedure, 2020 PM: 7.5mg    Recheck INR 5-7 days after resuming warfarin   ?   Autumn Casiano [facility]    Subjective/Objective:      Oh Solares, a 75 year old male    Reason for Anticoagulation: Factor V Leiden and Venous Thrombosis    Goal INR Range: 2.0-3.0    Patient bridged in past: No    Pertinent History: unable to verify hetero VS. Homozygous Factor V Leiden    Wt Readings from Last 3 Encounters:   20 67.1 kg (148 lb)   19 66.2 kg (146 lb)   18 64.9 kg (143 lb)        Ideal body weight: 63.8 kg (140 lb 10.5 oz)  Adjusted ideal body weight: 65.1 kg (143 lb 9.5 oz)     Lab Results   Component Value Date    INR 3.60 (H) 2020    INR 2.20 (H) 10/05/2020    INR 2.80 (H) 2020     Lab Results   Component Value Date    HGB 14.6 2020    HCT 43.8 2020     2020     Lab Results   Component Value Date    CR 1.01 2020    CR 0.94 2019    CR 1.02 2018     CrCl cannot be calculated (Patient's most recent lab result is older than the maximum 10 days allowed.).

## 2020-11-02 NOTE — TELEPHONE ENCOUNTER
Patient is having a colonoscopy on 11/9. Routing to pharmacist to review for bridging plan.    Danay Pradhan RN - [facility]

## 2020-11-03 NOTE — TELEPHONE ENCOUNTER
Call was placed to patient and wife to give approved warfarin plan prior to procedure on 11/9.   Verbalized understanding. Will start hold terri.    Edgar Randolph RN

## 2020-11-05 DIAGNOSIS — Z11.59 ENCOUNTER FOR SCREENING FOR OTHER VIRAL DISEASES: ICD-10-CM

## 2020-11-05 PROCEDURE — U0003 INFECTIOUS AGENT DETECTION BY NUCLEIC ACID (DNA OR RNA); SEVERE ACUTE RESPIRATORY SYNDROME CORONAVIRUS 2 (SARS-COV-2) (CORONAVIRUS DISEASE [COVID-19]), AMPLIFIED PROBE TECHNIQUE, MAKING USE OF HIGH THROUGHPUT TECHNOLOGIES AS DESCRIBED BY CMS-2020-01-R: HCPCS | Performed by: SURGERY

## 2020-11-06 LAB
SARS-COV-2 RNA SPEC QL NAA+PROBE: NOT DETECTED
SPECIMEN SOURCE: NORMAL

## 2020-11-09 ENCOUNTER — HOSPITAL ENCOUNTER (OUTPATIENT)
Facility: AMBULATORY SURGERY CENTER | Age: 75
Discharge: HOME | End: 2020-11-09
Attending: SURGERY | Admitting: SURGERY
Payer: COMMERCIAL

## 2020-11-09 VITALS
HEART RATE: 83 BPM | OXYGEN SATURATION: 99 % | RESPIRATION RATE: 16 BRPM | SYSTOLIC BLOOD PRESSURE: 121 MMHG | DIASTOLIC BLOOD PRESSURE: 63 MMHG

## 2020-11-09 DIAGNOSIS — Z12.11 SCREENING FOR COLON CANCER: Primary | ICD-10-CM

## 2020-11-09 LAB
CAPILLARY BLOOD COLLECTION: NORMAL
COLONOSCOPY: NORMAL
INR BLD: 1 (ref 0.86–1.14)

## 2020-11-09 PROCEDURE — 85610 PROTHROMBIN TIME: CPT | Performed by: SURGERY

## 2020-11-09 PROCEDURE — G8907 PT DOC NO EVENTS ON DISCHARG: HCPCS

## 2020-11-09 PROCEDURE — 36416 COLLJ CAPILLARY BLOOD SPEC: CPT | Performed by: SURGERY

## 2020-11-09 PROCEDURE — 45385 COLONOSCOPY W/LESION REMOVAL: CPT | Mod: PT

## 2020-11-09 PROCEDURE — 45385 COLONOSCOPY W/LESION REMOVAL: CPT | Mod: PT | Performed by: SURGERY

## 2020-11-09 PROCEDURE — 88305 TISSUE EXAM BY PATHOLOGIST: CPT | Performed by: PATHOLOGY

## 2020-11-09 PROCEDURE — G8918 PT W/O PREOP ORDER IV AB PRO: HCPCS

## 2020-11-09 PROCEDURE — G0500 MOD SEDAT ENDO SERVICE >5YRS: HCPCS | Performed by: SURGERY

## 2020-11-09 RX ORDER — ONDANSETRON 4 MG/1
4 TABLET, ORALLY DISINTEGRATING ORAL EVERY 6 HOURS PRN
Status: DISCONTINUED | OUTPATIENT
Start: 2020-11-09 | End: 2020-11-10 | Stop reason: HOSPADM

## 2020-11-09 RX ORDER — PROCHLORPERAZINE MALEATE 5 MG
5 TABLET ORAL EVERY 6 HOURS PRN
Status: DISCONTINUED | OUTPATIENT
Start: 2020-11-09 | End: 2020-11-10 | Stop reason: HOSPADM

## 2020-11-09 RX ORDER — NALOXONE HYDROCHLORIDE 0.4 MG/ML
.1-.4 INJECTION, SOLUTION INTRAMUSCULAR; INTRAVENOUS; SUBCUTANEOUS
Status: DISCONTINUED | OUTPATIENT
Start: 2020-11-09 | End: 2020-11-10 | Stop reason: HOSPADM

## 2020-11-09 RX ORDER — LIDOCAINE 40 MG/G
CREAM TOPICAL
Status: DISCONTINUED | OUTPATIENT
Start: 2020-11-09 | End: 2020-11-10 | Stop reason: HOSPADM

## 2020-11-09 RX ORDER — ONDANSETRON 2 MG/ML
4 INJECTION INTRAMUSCULAR; INTRAVENOUS EVERY 6 HOURS PRN
Status: DISCONTINUED | OUTPATIENT
Start: 2020-11-09 | End: 2020-11-10 | Stop reason: HOSPADM

## 2020-11-09 RX ORDER — FENTANYL CITRATE 50 UG/ML
INJECTION, SOLUTION INTRAMUSCULAR; INTRAVENOUS PRN
Status: DISCONTINUED | OUTPATIENT
Start: 2020-11-09 | End: 2020-11-09 | Stop reason: HOSPADM

## 2020-11-09 RX ORDER — FLUMAZENIL 0.1 MG/ML
0.2 INJECTION, SOLUTION INTRAVENOUS
Status: SHIPPED | OUTPATIENT
Start: 2020-11-09 | End: 2020-11-09

## 2020-11-11 LAB — COPATH REPORT: NORMAL

## 2020-11-16 ENCOUNTER — ANTICOAGULATION THERAPY VISIT (OUTPATIENT)
Dept: FAMILY MEDICINE | Facility: CLINIC | Age: 75
End: 2020-11-16

## 2020-11-16 DIAGNOSIS — D68.9 COAGULATION DEFECT (H): ICD-10-CM

## 2020-11-16 DIAGNOSIS — Z79.01 LONG TERM CURRENT USE OF ANTICOAGULANT THERAPY: ICD-10-CM

## 2020-11-16 DIAGNOSIS — D68.51 FACTOR V LEIDEN MUTATION (H): ICD-10-CM

## 2020-11-16 LAB
CAPILLARY BLOOD COLLECTION: NORMAL
INR PPP: 1.5 (ref 0.86–1.14)

## 2020-11-16 PROCEDURE — 85610 PROTHROMBIN TIME: CPT | Performed by: INTERNAL MEDICINE

## 2020-11-16 PROCEDURE — 36416 COLLJ CAPILLARY BLOOD SPEC: CPT | Performed by: INTERNAL MEDICINE

## 2020-11-16 PROCEDURE — 99207 PR NO CHARGE NURSE ONLY: CPT | Performed by: INTERNAL MEDICINE

## 2020-11-16 NOTE — PROGRESS NOTES
ANTICOAGULATION FOLLOW-UP CLINIC VISIT    Patient Name:  Oh Solares  Date:  2020  Contact Type:  Telephone  Had intentional hold for colonoscopy will give bolus to catch up  SUBJECTIVE:  Patient Findings     Comments:    Assessed for S/S bleeding, clotting, medication, diet, health, activity and alcohol changes          Clinical Outcomes     Negatives:  Major bleeding event, Thromboembolic event, Anticoagulation-related hospital admission, Anticoagulation-related ED visit, Anticoagulation-related fatality    Comments:    Assessed for S/S bleeding, clotting, medication, diet, health, activity and alcohol changes             OBJECTIVE    Recent labs: (last 7 days)     20  1043   INR 1.50*       ASSESSMENT / PLAN  INR assessment SUB    Recheck INR In: 2 WEEKS    INR Location Outside lab      Anticoagulation Summary  As of 2020    INR goal:  2.0-3.0   TTR:  60.5 % (1 y)   INR used for dosin.50 (2020)   Warfarin maintenance plan:  7.5 mg (5 mg x 1.5) every Mon; 5 mg (5 mg x 1) all other days   Full warfarin instructions:  : 10 mg; : 7.5 mg; Otherwise 7.5 mg every Mon; 5 mg all other days   Weekly warfarin total:  37.5 mg   Plan last modified:  Zulema Genao, RN (2020)   Next INR check:  2020   Priority:  Maintenance   Target end date:  Indefinite    Indications    Coagulation defect (HCC) [D68.9] [D68.9]  Factor V Leiden mutation (H) [D68.51]  Long-term (current) use of anticoagulants [Z79.01] [Z79.01]             Anticoagulation Episode Summary     INR check location:      Preferred lab:      Send INR reminders to:  [facility]    Comments:        Anticoagulation Care Providers     Provider Role Specialty Phone number    Karina Galvez PA-C Referring Internal Medicine - Pediatrics [phone]    Brendon Kay MD  Internal Medicine - Pediatrics [phone]            See the Encounter Report to view Anticoagulation Flowsheet and  Dosing Calendar (Go to Encounters tab in chart review, and find the Anticoagulation Therapy Visit)        Ledy Mccall RN

## 2020-11-17 DIAGNOSIS — F41.9 ANXIETY: ICD-10-CM

## 2020-11-19 RX ORDER — ALPRAZOLAM 0.25 MG
TABLET ORAL
Qty: 30 TABLET | Refills: 0 | Status: SHIPPED | OUTPATIENT
Start: 2020-11-19 | End: 2020-12-11

## 2020-11-30 ENCOUNTER — ANTICOAGULATION THERAPY VISIT (OUTPATIENT)
Dept: FAMILY MEDICINE | Facility: CLINIC | Age: 75
End: 2020-11-30

## 2020-11-30 DIAGNOSIS — Z79.01 LONG TERM CURRENT USE OF ANTICOAGULANT THERAPY: ICD-10-CM

## 2020-11-30 DIAGNOSIS — D68.9 COAGULATION DEFECT (H): ICD-10-CM

## 2020-11-30 DIAGNOSIS — D68.51 FACTOR V LEIDEN MUTATION (H): ICD-10-CM

## 2020-11-30 LAB
CAPILLARY BLOOD COLLECTION: NORMAL
INR PPP: 2.9 (ref 0.86–1.14)

## 2020-11-30 PROCEDURE — 85610 PROTHROMBIN TIME: CPT | Performed by: INTERNAL MEDICINE

## 2020-11-30 PROCEDURE — 36416 COLLJ CAPILLARY BLOOD SPEC: CPT | Performed by: INTERNAL MEDICINE

## 2020-11-30 NOTE — PROGRESS NOTES
ANTICOAGULATION MANAGEMENT     Patient Name:  Oh Solares  Date:  2020    ASSESSMENT /SUBJECTIVE:    Today's INR result of 2.90 is therapeutic. Goal INR of 2.0-3.0      Warfarin dose taken: Warfarin taken as instructed    Diet: No new diet changes affecting INR    Medication changes/ interactions: No new medications/supplements affecting INR    Previous INR: Subtherapeutic     S/S of bleeding or thromboembolism: No    New injury or illness: No    Upcoming surgery, procedure or cardioversion: No    Additional findings: None      PLAN:    Telephone call with Oh regarding INR result and instructed:     Warfarin Dosing Instructions: Continue your current warfarin dose 7.5mg every Mon; 5mg all other days    Instructed patient to follow up no later than: 3 weeks  Lab visit scheduled    Education provided: Target INR goal and significance of current INR result and Contact the anticoagulation clinic with any changes, questions or concerns at #[phone]       Oh verbalizes understanding and agrees to warfarin dosing plan.    Instructed to call the Anticoagulation Clinic for any changes, questions or concerns. (#[phone])        SUSANA MATHUR RN      OBJECTIVE:  Recent labs: (last 7 days)     20  1120   INR 2.90*         No question data found.  Anticoagulation Summary  As of 2020    INR goal:  2.0-3.0   TTR:  59.2 % (1 y)   INR used for dosin.90 (2020)   Warfarin maintenance plan:  7.5 mg (5 mg x 1.5) every Mon; 5 mg (5 mg x 1) all other days   Full warfarin instructions:  7.5 mg every Mon; 5 mg all other days   Weekly warfarin total:  37.5 mg   No change documented:  Susana Mathur RN   Plan last modified:  Zulema Genao, RN (2020)   Next INR check:  2020   Priority:  Maintenance   Target end date:  Indefinite    Indications    Coagulation defect (HCC) [D68.9] [D68.9]  Factor V Leiden mutation (H) [D68.51]  Long-term (current) use of anticoagulants [Z79.01]  [Z79.01]             Anticoagulation Episode Summary     INR check location:      Preferred lab:      Send INR reminders to:  [facility]    Comments:        Anticoagulation Care Providers     Provider Role Specialty Phone number    Karina Galvez PA-C Referring Internal Medicine - Pediatrics [phone]    Brendon Kay MD  Internal Medicine - Pediatrics [phone]

## 2020-12-04 DIAGNOSIS — M54.50 CHRONIC BILATERAL LOW BACK PAIN WITHOUT SCIATICA: ICD-10-CM

## 2020-12-04 DIAGNOSIS — G89.29 CHRONIC BILATERAL LOW BACK PAIN WITHOUT SCIATICA: ICD-10-CM

## 2020-12-04 DIAGNOSIS — G89.4 CHRONIC PAIN SYNDROME: ICD-10-CM

## 2020-12-04 NOTE — TELEPHONE ENCOUNTER
Reason for Call:  Medication or medication refill:    Do you use a [facility]?  Name of the pharmacy and phone number for the current request:  [facility] [address] [place] [facility], [place] - [facility] [address]    Name of the medication requested:  HYDROcodone-acetaminophen (NORCO) 5-325 MG tablet    Other request: None    Can we leave a detailed message on this number? YES    Phone number patient can be reached at: Home number on file [phone] (home)    Best Time: Any    Call taken on 12/4/2020 at 4:43 PM by Yanet Larson

## 2020-12-06 RX ORDER — HYDROCODONE BITARTRATE AND ACETAMINOPHEN 5; 325 MG/1; MG/1
1 TABLET ORAL EVERY 6 HOURS PRN
Qty: 30 TABLET | Refills: 0 | Status: SHIPPED | OUTPATIENT
Start: 2020-12-06 | End: 2021-01-20

## 2020-12-10 DIAGNOSIS — F41.9 ANXIETY: ICD-10-CM

## 2020-12-11 RX ORDER — ALPRAZOLAM 0.25 MG
TABLET ORAL
Qty: 30 TABLET | Refills: 0 | Status: SHIPPED | OUTPATIENT
Start: 2020-12-11 | End: 2021-01-04

## 2020-12-11 NOTE — TELEPHONE ENCOUNTER
Requested Prescriptions   Pending Prescriptions Disp Refills    ALPRAZolam (XANAX) 0.25 MG tablet [Pharmacy Med Name: ALPRAZOLAM 0.25 MG TABLET] 30 tablet 0     Sig: TAKE 1 TABLET BY MOUTH THREE TIMES A DAY AS NEEDED FOR ANXIETY       There is no refill protocol information for this order        Routing refill request to provider for review/approval because:  Drug not on the [facility] refill protocol   Cathy MENDOZA-RN  Triage Nurse  [facility]

## 2020-12-21 ENCOUNTER — ANTICOAGULATION THERAPY VISIT (OUTPATIENT)
Dept: FAMILY MEDICINE | Facility: CLINIC | Age: 75
End: 2020-12-21

## 2020-12-21 DIAGNOSIS — D68.51 FACTOR V LEIDEN MUTATION (H): ICD-10-CM

## 2020-12-21 DIAGNOSIS — Z79.01 LONG TERM CURRENT USE OF ANTICOAGULANT THERAPY: ICD-10-CM

## 2020-12-21 DIAGNOSIS — D68.9 COAGULATION DEFECT (H): ICD-10-CM

## 2020-12-21 LAB
CAPILLARY BLOOD COLLECTION: NORMAL
INR PPP: 3.1 (ref 0.86–1.14)

## 2020-12-21 PROCEDURE — 85610 PROTHROMBIN TIME: CPT | Performed by: INTERNAL MEDICINE

## 2020-12-21 PROCEDURE — 36416 COLLJ CAPILLARY BLOOD SPEC: CPT | Performed by: INTERNAL MEDICINE

## 2020-12-21 NOTE — PROGRESS NOTES
ANTICOAGULATION MANAGEMENT     Patient Name:  Oh Solares  Date:  12/21/2020    ASSESSMENT /SUBJECTIVE:    Today's INR result of 3.10 is therapeutic. Goal INR of 2.0-3.0      Warfarin dose taken: Warfarin taken as instructed    Diet: No new diet changes affecting INR    Medication changes/ interactions: No new medications/supplements affecting INR    Previous INR: Therapeutic     S/S of bleeding or thromboembolism: No    New injury or illness: No    Upcoming surgery, procedure or cardioversion: No    Additional findings: None      PLAN:    Telephone call with Oh regarding INR result and instructed:     Warfarin Dosing Instructions: Continue your current warfarin dose 7.5mg every Mon; 5mg all other days of the week.     Instructed patient to follow up no later than: Recommended recheck in 3 weeks, pt requested 4 weeks.  Lab visit scheduled    Education provided: Target INR goal and significance of current INR result and Importance of therapeutic range      Oh verbalizes understanding and agrees to warfarin dosing plan.    Instructed to call the Anticoagulation Clinic for any changes, questions or concerns. (#[phone])        Edgar Brower RN      OBJECTIVE:  Recent labs: (last 7 days)     12/21/20  1050   INR 3.10*         No question data found.  Anticoagulation Summary  As of 12/21/2020    INR goal:  2.0-3.0   TTR:  57.9 % (1 y)   INR used for dosing:  3.10 (12/21/2020)   Warfarin maintenance plan:  7.5 mg (5 mg x 1.5) every Mon; 5 mg (5 mg x 1) all other days   Full warfarin instructions:  7.5 mg every Mon; 5 mg all other days   Weekly warfarin total:  37.5 mg   Plan last modified:  Zulema Genao RN (5/28/2020)   Next INR check:     Priority:  Maintenance   Target end date:  Indefinite    Indications    Coagulation defect (HCC) [D68.9] [D68.9]  Factor V Leiden mutation (H) [D68.51]  Long-term (current) use of anticoagulants [Z79.01] [Z79.01]             Anticoagulation Episode Summary     INR  check location:      Preferred lab:      Send INR reminders to:  [facility]    Comments:        Anticoagulation Care Providers     Provider Role Specialty Phone number    Karina Galvez PA-C Referring Internal Medicine - Pediatrics [phone]    Brendon Kay MD  Internal Medicine - Pediatrics [phone]

## 2021-01-01 DIAGNOSIS — F41.9 ANXIETY: ICD-10-CM

## 2021-01-04 RX ORDER — ALPRAZOLAM 0.25 MG
TABLET ORAL
Qty: 30 TABLET | Refills: 0 | Status: SHIPPED | OUTPATIENT
Start: 2021-01-04 | End: 2021-02-17

## 2021-01-11 DIAGNOSIS — F51.01 PRIMARY INSOMNIA: ICD-10-CM

## 2021-01-13 RX ORDER — ZOLPIDEM TARTRATE 10 MG/1
TABLET ORAL
Qty: 31 TABLET | Refills: 2 | Status: SHIPPED | OUTPATIENT
Start: 2021-01-13 | End: 2021-03-15

## 2021-01-18 DIAGNOSIS — G89.29 CHRONIC BILATERAL LOW BACK PAIN WITHOUT SCIATICA: ICD-10-CM

## 2021-01-18 DIAGNOSIS — M54.50 CHRONIC BILATERAL LOW BACK PAIN WITHOUT SCIATICA: ICD-10-CM

## 2021-01-18 DIAGNOSIS — G89.4 CHRONIC PAIN SYNDROME: ICD-10-CM

## 2021-01-19 ENCOUNTER — OFFICE VISIT (OUTPATIENT)
Dept: LAB | Facility: CLINIC | Age: 76
End: 2021-01-19
Payer: COMMERCIAL

## 2021-01-19 ENCOUNTER — ANTICOAGULATION THERAPY VISIT (OUTPATIENT)
Dept: FAMILY MEDICINE | Facility: CLINIC | Age: 76
End: 2021-01-19

## 2021-01-19 DIAGNOSIS — D68.51 FACTOR V LEIDEN MUTATION (H): ICD-10-CM

## 2021-01-19 DIAGNOSIS — Z79.01 LONG TERM CURRENT USE OF ANTICOAGULANT THERAPY: ICD-10-CM

## 2021-01-19 DIAGNOSIS — D68.9 COAGULATION DEFECT (H): ICD-10-CM

## 2021-01-19 LAB
CAPILLARY BLOOD COLLECTION: NORMAL
INR PPP: 3.6 (ref 0.86–1.14)

## 2021-01-19 PROCEDURE — 85610 PROTHROMBIN TIME: CPT | Performed by: INTERNAL MEDICINE

## 2021-01-19 PROCEDURE — 36416 COLLJ CAPILLARY BLOOD SPEC: CPT | Performed by: INTERNAL MEDICINE

## 2021-01-19 NOTE — PROGRESS NOTES
Call from the patient who reports he is going to be out of state until 3/1/21. Will not be checking his INR during that time.    In light of this information will change maintenance dose to 5 mg daily (< 6.7%).  Appointment made for 3/2/21 recheck and calendar updated. Patient voiced understanding and agreed to plan of care. Liat Cardozo RN January 19, 2021 1:58 PM

## 2021-01-19 NOTE — PROGRESS NOTES
Anticoagulation Management    Unable to reach [facility] today.    Today's INR result of 3.6 is supratherapeutic (goal INR of 2.0-3.0).  Result received from: Clinic Lab    Follow up required to confirm warfarin dose taken and assess for changes    left messge to hold dose today make follow up for 2 weeks and call if reason for change and sins and symptoms of bleeding.      Anticoagulation clinic to follow up    Ledy Mccall RN

## 2021-01-20 RX ORDER — HYDROCODONE BITARTRATE AND ACETAMINOPHEN 5; 325 MG/1; MG/1
1 TABLET ORAL EVERY 6 HOURS PRN
Qty: 30 TABLET | Refills: 0 | Status: SHIPPED | OUTPATIENT
Start: 2021-01-20 | End: 2021-03-04

## 2021-02-17 DIAGNOSIS — F41.9 ANXIETY: ICD-10-CM

## 2021-02-17 RX ORDER — ALPRAZOLAM 0.25 MG
TABLET ORAL
Qty: 30 TABLET | Refills: 0 | Status: SHIPPED | OUTPATIENT
Start: 2021-02-17 | End: 2021-04-02

## 2021-02-17 RX ORDER — ALPRAZOLAM 0.25 MG
TABLET ORAL
Qty: 30 TABLET | Refills: 0 | Status: SHIPPED | OUTPATIENT
Start: 2021-02-17 | End: 2021-02-17

## 2021-03-02 ENCOUNTER — ANTICOAGULATION THERAPY VISIT (OUTPATIENT)
Dept: FAMILY MEDICINE | Facility: CLINIC | Age: 76
End: 2021-03-02

## 2021-03-02 DIAGNOSIS — D68.9 COAGULATION DEFECT (H): ICD-10-CM

## 2021-03-02 DIAGNOSIS — Z79.01 LONG TERM CURRENT USE OF ANTICOAGULANT THERAPY: ICD-10-CM

## 2021-03-02 DIAGNOSIS — D68.51 FACTOR V LEIDEN MUTATION (H): ICD-10-CM

## 2021-03-02 LAB
CAPILLARY BLOOD COLLECTION: NORMAL
INR PPP: 3.8 (ref 0.86–1.14)

## 2021-03-02 PROCEDURE — 85610 PROTHROMBIN TIME: CPT | Performed by: INTERNAL MEDICINE

## 2021-03-02 PROCEDURE — 36416 COLLJ CAPILLARY BLOOD SPEC: CPT | Performed by: INTERNAL MEDICINE

## 2021-03-02 NOTE — PROGRESS NOTES
Call to patient, spoke with wife who requested [facility] call back in 30-60 minutes because the patient is currently on the phone with his insurance company.     [facility] will follow up with patient @ requested time.     Edgar Brower RN

## 2021-03-02 NOTE — PROGRESS NOTES
ANTICOAGULATION MANAGEMENT     Patient Name:  Oh Solares  Date:  3/2/2021    ASSESSMENT /SUBJECTIVE:    Today's INR result of 3.80 is supratherapeutic. Goal INR of 2.0-3.0      Warfarin dose taken: Warfarin taken as instructed    Diet: Decreased greens/vitamin K in diet; plans to resume previous intake - Reports he was on vacation down in [place] and had decreased greens and increased alcohol, plans to resume to regular diet and minimal alcohol.     Medication changes/ interactions: No new medications/supplements affecting INR    Previous INR: Supratherapeutic     S/S of bleeding or thromboembolism: No    New injury or illness: No    Upcoming surgery, procedure or cardioversion: No    Additional findings: Just got back from [place] a couple of days ago after being on vacation for a couple of months.       PLAN:    Telephone call with Oh regarding INR result and instructed:     Warfarin Dosing Instructions: Hold warfarin tonight then continue your current warfarin dose of 5mg everyday.     Instructed patient to follow up no later than: 2 weeks  Lab visit scheduled    Education provided: Potential interaction between warfarin and alcohol, Target INR goal and significance of current INR result and Importance of therapeutic range      Oh verbalizes understanding and agrees to warfarin dosing plan.    Instructed to call the Anticoagulation Clinic for any changes, questions or concerns. (#[phone])        Edgar Brower RN      OBJECTIVE:  Recent labs: (last 7 days)     03/02/21  0950   INR 3.80*         No question data found.  Anticoagulation Summary  As of 3/2/2021    INR goal:  2.0-3.0   TTR:  39.9 % (1 y)   INR used for dosing:  3.80 (3/2/2021)   Warfarin maintenance plan:  5 mg (5 mg x 1) every day   Full warfarin instructions:  5 mg every day   Weekly warfarin total:  35 mg   Plan last modified:  Liat Huertas RN (1/19/2021)   Next INR check:     Priority:  Maintenance   Target end date:  Indefinite     Indications    Coagulation defect (HCC) [D68.9] [D68.9]  Factor V Leiden mutation (H) [D68.51]  Long-term (current) use of anticoagulants [Z79.01] [Z79.01]             Anticoagulation Episode Summary     INR check location:      Preferred lab:      Send INR reminders to:  JESSICA MOY    Comments:        Anticoagulation Care Providers     Provider Role Specialty Phone number    Karina Galvez PA-C Referring Internal Medicine - Pediatrics [phone]    Brendon Kay MD  Internal Medicine - Pediatrics [phone]

## 2021-03-04 DIAGNOSIS — M54.50 CHRONIC BILATERAL LOW BACK PAIN WITHOUT SCIATICA: ICD-10-CM

## 2021-03-04 DIAGNOSIS — G89.4 CHRONIC PAIN SYNDROME: ICD-10-CM

## 2021-03-04 DIAGNOSIS — G89.29 CHRONIC BILATERAL LOW BACK PAIN WITHOUT SCIATICA: ICD-10-CM

## 2021-03-04 RX ORDER — HYDROCODONE BITARTRATE AND ACETAMINOPHEN 5; 325 MG/1; MG/1
1 TABLET ORAL EVERY 6 HOURS PRN
Qty: 30 TABLET | Refills: 0 | Status: SHIPPED | OUTPATIENT
Start: 2021-03-04 | End: 2021-05-03

## 2021-03-04 NOTE — TELEPHONE ENCOUNTER
Controlled Substance Refill Request for HYDROcodone-acetaminophen (NORCO) 5-325 MG tablet  Problem List Complete:  Yes   checked in past 3 months?  No, route to RN

## 2021-03-04 NOTE — TELEPHONE ENCOUNTER
Reason for call:  Medication   If this is a refill request, has the caller requested the refill from the pharmacy already? No  Will the patient be using a [facility]? No  Name of the pharmacy and phone number for the current request:   [facility] [address] [place] [facility], [place] - [address]    Name of the medication requested: HYDROcodone-acetaminophen (NORCO) 5-325 MG tablet    Other request: patient is out of medications at this time    Phone number to reach patient:      Cell number on file:    Telephone Information:   Mobile [phone]       Best Time:  anytime    Can we leave a detailed message on this number?  YES    Travel screening: Not Applicable

## 2021-03-08 ENCOUNTER — IMMUNIZATION (OUTPATIENT)
Dept: NURSING | Facility: CLINIC | Age: 76
End: 2021-03-08
Payer: COMMERCIAL

## 2021-03-08 PROCEDURE — 0001A PR COVID VAC PFIZER DIL RECON 30 MCG/0.3 ML IM: CPT

## 2021-03-08 PROCEDURE — 91300 PR COVID VAC PFIZER DIL RECON 30 MCG/0.3 ML IM: CPT

## 2021-03-11 DIAGNOSIS — Z79.01 LONG TERM CURRENT USE OF ANTICOAGULANT THERAPY: ICD-10-CM

## 2021-03-11 RX ORDER — WARFARIN SODIUM 5 MG/1
TABLET ORAL
Qty: 100 TABLET | Refills: 4 | Status: SHIPPED | OUTPATIENT
Start: 2021-03-11 | End: 2022-03-25

## 2021-03-15 ENCOUNTER — ANTICOAGULATION THERAPY VISIT (OUTPATIENT)
Dept: FAMILY MEDICINE | Facility: CLINIC | Age: 76
End: 2021-03-15

## 2021-03-15 DIAGNOSIS — Z79.01 LONG TERM CURRENT USE OF ANTICOAGULANT THERAPY: ICD-10-CM

## 2021-03-15 DIAGNOSIS — D68.9 COAGULATION DEFECT (H): ICD-10-CM

## 2021-03-15 DIAGNOSIS — D68.51 FACTOR V LEIDEN MUTATION (H): ICD-10-CM

## 2021-03-15 DIAGNOSIS — F51.01 PRIMARY INSOMNIA: ICD-10-CM

## 2021-03-15 LAB
CAPILLARY BLOOD COLLECTION: NORMAL
INR PPP: 2.1 (ref 0.86–1.14)

## 2021-03-15 PROCEDURE — 85610 PROTHROMBIN TIME: CPT | Performed by: INTERNAL MEDICINE

## 2021-03-15 PROCEDURE — 36416 COLLJ CAPILLARY BLOOD SPEC: CPT | Performed by: INTERNAL MEDICINE

## 2021-03-15 PROCEDURE — 99207 PR NO CHARGE NURSE ONLY: CPT | Performed by: INTERNAL MEDICINE

## 2021-03-15 RX ORDER — ZOLPIDEM TARTRATE 10 MG/1
TABLET ORAL
Qty: 31 TABLET | Refills: 2 | Status: SHIPPED | OUTPATIENT
Start: 2021-03-15 | End: 2021-06-18

## 2021-03-15 NOTE — PROGRESS NOTES
ANTICOAGULATION FOLLOW-UP CLINIC VISIT    Patient Name:  Oh Solares  Date:  3/15/2021  Contact Type:  Telephone    SUBJECTIVE:  Patient Findings         Clinical Outcomes     Negatives:  Major bleeding event, Thromboembolic event, Anticoagulation-related hospital admission, Anticoagulation-related ED visit, Anticoagulation-related fatality           OBJECTIVE    Recent labs: (last 7 days)     03/15/21  1040   INR 2.10*       ASSESSMENT / PLAN  INR assessment THER    Recheck INR In: 4 WEEKS    INR Location Clinic      Anticoagulation Summary  As of 3/15/2021    INR goal:  2.0-3.0   TTR:  38.3 % (1 y)   INR used for dosin.10 (3/15/2021)   Warfarin maintenance plan:  5 mg (5 mg x 1) every day   Full warfarin instructions:  5 mg every day   Weekly warfarin total:  35 mg   No change documented:  Ledy Mccall RN   Plan last modified:  Liat Huertas RN (2021)   Next INR check:  2021   Priority:  Maintenance   Target end date:  Indefinite    Indications    Coagulation defect (HCC) [D68.9] [D68.9]  Factor V Leiden mutation (H) [D68.51]  Long-term (current) use of anticoagulants [Z79.01] [Z79.01]             Anticoagulation Episode Summary     INR check location:      Preferred lab:      Send INR reminders to:  JESSICA MOY    Comments:        Anticoagulation Care Providers     Provider Role Specialty Phone number    Karina Galvez PA-C Referring Family Medicine [phone]    Brendon Kay MD  Internal Medicine - Pediatrics [phone]            See the Encounter Report to view Anticoagulation Flowsheet and Dosing Calendar (Go to Encounters tab in chart review, and find the Anticoagulation Therapy Visit)        Ledy Mccall RN

## 2021-03-29 ENCOUNTER — IMMUNIZATION (OUTPATIENT)
Dept: NURSING | Facility: CLINIC | Age: 76
End: 2021-03-29
Attending: FAMILY MEDICINE
Payer: COMMERCIAL

## 2021-03-29 PROCEDURE — 0002A PR COVID VAC PFIZER DIL RECON 30 MCG/0.3 ML IM: CPT

## 2021-03-29 PROCEDURE — 91300 PR COVID VAC PFIZER DIL RECON 30 MCG/0.3 ML IM: CPT

## 2021-04-01 DIAGNOSIS — F41.9 ANXIETY: ICD-10-CM

## 2021-04-02 RX ORDER — ALPRAZOLAM 0.25 MG
TABLET ORAL
Qty: 30 TABLET | Refills: 0 | Status: SHIPPED | OUTPATIENT
Start: 2021-04-02 | End: 2021-06-03

## 2021-04-14 DIAGNOSIS — Z79.01 LONG TERM CURRENT USE OF ANTICOAGULANT THERAPY: Primary | ICD-10-CM

## 2021-04-19 ENCOUNTER — ANTICOAGULATION THERAPY VISIT (OUTPATIENT)
Dept: FAMILY MEDICINE | Facility: CLINIC | Age: 76
End: 2021-04-19

## 2021-04-19 DIAGNOSIS — D68.51 FACTOR V LEIDEN MUTATION (H): ICD-10-CM

## 2021-04-19 DIAGNOSIS — Z79.01 LONG TERM CURRENT USE OF ANTICOAGULANT THERAPY: ICD-10-CM

## 2021-04-19 DIAGNOSIS — D68.9 COAGULATION DEFECT (H): ICD-10-CM

## 2021-04-19 LAB
CAPILLARY BLOOD COLLECTION: NORMAL
INR PPP: 2.3 (ref 0.86–1.14)

## 2021-04-19 PROCEDURE — 85610 PROTHROMBIN TIME: CPT | Performed by: INTERNAL MEDICINE

## 2021-04-19 PROCEDURE — 99207 PR NO CHARGE NURSE ONLY: CPT | Performed by: INTERNAL MEDICINE

## 2021-04-19 PROCEDURE — 36416 COLLJ CAPILLARY BLOOD SPEC: CPT | Performed by: INTERNAL MEDICINE

## 2021-04-19 NOTE — PROGRESS NOTES
ANTICOAGULATION FOLLOW-UP CLINIC VISIT    Patient Name:  Oh Solares  Date:  2021  Contact Type:  Telephone    SUBJECTIVE:  Patient Findings     Comments:    Assessed for S/S bleeding, clotting, medication, diet, health, activity and alcohol changes          Clinical Outcomes     Negatives:  Major bleeding event, Thromboembolic event, Anticoagulation-related hospital admission, Anticoagulation-related ED visit, Anticoagulation-related fatality    Comments:    Assessed for S/S bleeding, clotting, medication, diet, health, activity and alcohol changes             OBJECTIVE    Recent labs: (last 7 days)     21  1125   INR 2.30*       ASSESSMENT / PLAN  INR assessment THER    Recheck INR In: 6 WEEKS    INR Location Clinic      Anticoagulation Summary  As of 2021    INR goal:  2.0-3.0   TTR:  43.6 % (1 y)   INR used for dosin.30 (2021)   Warfarin maintenance plan:  5 mg (5 mg x 1) every day   Full warfarin instructions:  5 mg every day   Weekly warfarin total:  35 mg   No change documented:  Ledy Mccall RN   Plan last modified:  Liat Huertas RN (2021)   Next INR check:  2021   Priority:  Maintenance   Target end date:  Indefinite    Indications    Coagulation defect (HCC) [D68.9] [D68.9]  Factor V Leiden mutation (H) [D68.51]  Long-term (current) use of anticoagulants [Z79.01] [Z79.01]             Anticoagulation Episode Summary     INR check location:      Preferred lab:      Send INR reminders to:  JESSICA MOY    Comments:        Anticoagulation Care Providers     Provider Role Specialty Phone number    Karina Galvez PA-C Referring Family Medicine [phone]    Brendon Kay MD  Internal Medicine - Pediatrics [phone]            See the Encounter Report to view Anticoagulation Flowsheet and Dosing Calendar (Go to Encounters tab in chart review, and find the Anticoagulation Therapy Visit)        Ledy Mccall RN

## 2021-04-23 ENCOUNTER — TELEPHONE (OUTPATIENT)
Dept: FAMILY MEDICINE | Facility: CLINIC | Age: 76
End: 2021-04-23

## 2021-04-23 ENCOUNTER — TRANSFERRED RECORDS (OUTPATIENT)
Dept: HEALTH INFORMATION MANAGEMENT | Facility: CLINIC | Age: 76
End: 2021-04-23

## 2021-04-23 NOTE — TELEPHONE ENCOUNTER
Reason for Call:  Other test results.  DANIELLE     Detailed comments: Patient had a Quantaflo test done this morning.  In his left foot the results were normal, right foot were moderately decreased.  She did advise patient to be physically active, to keep consumption of fats lower in his diet, to basically live a healthy life style.    She will fax report to [phone].    Call taken on 4/23/2021 at 12:00 PM by Geovanna Murillo

## 2021-05-03 DIAGNOSIS — G89.4 CHRONIC PAIN SYNDROME: ICD-10-CM

## 2021-05-03 DIAGNOSIS — M54.50 CHRONIC BILATERAL LOW BACK PAIN WITHOUT SCIATICA: ICD-10-CM

## 2021-05-03 DIAGNOSIS — G89.29 CHRONIC BILATERAL LOW BACK PAIN WITHOUT SCIATICA: ICD-10-CM

## 2021-05-03 NOTE — TELEPHONE ENCOUNTER
Reason for Call:  Medication or medication refill:    Do you use a [facility]?  Name of the pharmacy and phone number for the current request:  [facility] [place] [phone]    Name of the medication requested: HYDROcodone-acetaminophen (NORCO) 5-325 MG tablet    Other request:     Can we leave a detailed message on this number? YES    Phone number patient can be reached at: Cell number on file:    Telephone Information:   Mobile [phone]       Best Time: any    Call taken on 5/3/2021 at 7:58 AM by Liz Rojo

## 2021-05-03 NOTE — TELEPHONE ENCOUNTER
Requested Prescriptions   Pending Prescriptions Disp Refills     HYDROcodone-acetaminophen (NORCO) 5-325 MG tablet 30 tablet 0     Sig: Take 1 tablet by mouth every 6 hours as needed for pain       There is no refill protocol information for this order        Routing refill request to provider for review/approval because:  Drug not on the [facility] refill protocol

## 2021-05-05 RX ORDER — HYDROCODONE BITARTRATE AND ACETAMINOPHEN 5; 325 MG/1; MG/1
1 TABLET ORAL EVERY 6 HOURS PRN
Qty: 30 TABLET | Refills: 0 | Status: SHIPPED | OUTPATIENT
Start: 2021-05-05 | End: 2021-07-06

## 2021-05-27 ENCOUNTER — ANTICOAGULATION THERAPY VISIT (OUTPATIENT)
Dept: FAMILY MEDICINE | Facility: CLINIC | Age: 76
End: 2021-05-27

## 2021-05-27 DIAGNOSIS — Z79.01 LONG TERM CURRENT USE OF ANTICOAGULANT THERAPY: ICD-10-CM

## 2021-05-27 DIAGNOSIS — D68.51 FACTOR V LEIDEN MUTATION (H): ICD-10-CM

## 2021-05-27 DIAGNOSIS — D68.9 COAGULATION DEFECT (H): ICD-10-CM

## 2021-05-27 LAB
CAPILLARY BLOOD COLLECTION: NORMAL
INR PPP: 3.2 (ref 0.86–1.14)

## 2021-05-27 PROCEDURE — 36416 COLLJ CAPILLARY BLOOD SPEC: CPT | Performed by: INTERNAL MEDICINE

## 2021-05-27 PROCEDURE — 85610 PROTHROMBIN TIME: CPT | Performed by: INTERNAL MEDICINE

## 2021-05-27 NOTE — PROGRESS NOTES
ANTICOAGULATION MANAGEMENT     Patient Name:  Oh Solares  Date:  5/27/2021    ASSESSMENT /SUBJECTIVE:    Today's INR result of 3.2 is supratherapeutic. Goal INR of 2.0-3.0      Warfarin dose taken: Warfarin taken as instructed    Diet: had a couple drinks yesterday    Medication changes/ interactions: No new medications/supplements affecting INR    Previous INR: Therapeutic     S/S of bleeding or thromboembolism: No    New injury or illness: No    Upcoming surgery, procedure or cardioversion: No    Additional findings: None      PLAN:    Telephone call with Oh regarding INR result and instructed:     Warfarin Dosing Instructions: Continue your current warfarin dose 5 mg daily    Instructed patient to follow up no later than: 2 weeks  Patient elected to schedule next visit 6/14    Education provided: Target INR goal and significance of current INR result      Oh verbalizes understanding and agrees to warfarin dosing plan.    Instructed to call the Anticoagulation Clinic for any changes, questions or concerns. (#[phone])        Danay Pradhan RN      OBJECTIVE:  Recent labs: (last 7 days)     05/27/21  0710   INR 3.20*         No question data found.  Anticoagulation Summary  As of 5/27/2021    INR goal:  2.0-3.0   TTR:  49.3 % (1 y)   INR used for dosing:  3.20 (5/27/2021)   Warfarin maintenance plan:  5 mg (5 mg x 1) every day   Full warfarin instructions:  5 mg every day   Weekly warfarin total:  35 mg   Plan last modified:  Liat Huertas RN (1/19/2021)   Next INR check:  6/10/2021   Priority:  Maintenance   Target end date:  Indefinite    Indications    Coagulation defect (HCC) [D68.9] [D68.9]  Factor V Leiden mutation (H) [D68.51]  Long-term (current) use of anticoagulants [Z79.01] [Z79.01]             Anticoagulation Episode Summary     INR check location:      Preferred lab:      Send INR reminders to:  JESSICA MOY    Comments:        Anticoagulation Care Providers      Provider Role Specialty Phone number    Karina Galvez PA-C Referring Family Medicine [phone]    Brendon Kay MD  Internal Medicine - Pediatrics [phone]

## 2021-05-27 NOTE — PROGRESS NOTES
Anticoagulation Management    Unable to reach Oh today.    Today's INR result of 3.2 is subtherapeutic (goal INR of 2.0-3.0).  Result received from: Clinic Lab    Follow up required to confirm warfarin dose taken and assess for changes    No instructions provided. Unable to leave voicemail.     Tentative plan: continue maintenance dose and recheck in 2 weeks    Anticoagulation clinic to follow up    Danay Pradhan RN

## 2021-06-02 DIAGNOSIS — F41.9 ANXIETY: ICD-10-CM

## 2021-06-03 RX ORDER — ALPRAZOLAM 0.25 MG
TABLET ORAL
Qty: 30 TABLET | Refills: 0 | Status: SHIPPED | OUTPATIENT
Start: 2021-06-03 | End: 2021-08-04

## 2021-06-03 NOTE — TELEPHONE ENCOUNTER
Routing refill request to provider for review/approval because:  Drug not on the [facility] refill protocol     Susana GILLESPIE RN, BSN  [facility]

## 2021-06-14 ENCOUNTER — ANTICOAGULATION THERAPY VISIT (OUTPATIENT)
Dept: NURSING | Facility: CLINIC | Age: 76
End: 2021-06-14

## 2021-06-14 DIAGNOSIS — D68.9 COAGULATION DEFECT (H): ICD-10-CM

## 2021-06-14 DIAGNOSIS — Z79.01 LONG TERM CURRENT USE OF ANTICOAGULANT THERAPY: ICD-10-CM

## 2021-06-14 DIAGNOSIS — D68.51 FACTOR V LEIDEN MUTATION (H): ICD-10-CM

## 2021-06-14 LAB
CAPILLARY BLOOD COLLECTION: NORMAL
INR PPP: 3.3 (ref 0.86–1.14)

## 2021-06-14 PROCEDURE — 85610 PROTHROMBIN TIME: CPT | Performed by: INTERNAL MEDICINE

## 2021-06-14 PROCEDURE — 36416 COLLJ CAPILLARY BLOOD SPEC: CPT | Performed by: INTERNAL MEDICINE

## 2021-06-14 NOTE — PROGRESS NOTES
ANTICOAGULATION MANAGEMENT     Patient Name:  Oh Solares  Date:  6/14/2021    ASSESSMENT /SUBJECTIVE:    Today's INR result of 3.30 is supratherapeutic. Goal INR of 2.0-3.0      Warfarin dose taken: Warfarin taken as instructed    Diet: Decreased greens/vitamin K in diet; plans to resume previous intake    Medication changes/ interactions: No new medications/supplements affecting INR    Previous INR: Supratherapeutic 3.20    S/S of bleeding or thromboembolism: No    New injury or illness: No    Upcoming surgery, procedure or cardioversion: No    Additional findings: None      PLAN:    Warfarin Dosing Instructions: Continue your current warfarin dose 5mg daily    Instructed patient to follow up no later than: 2 weeks  Lab visit scheduled    Education provided: Contact [facility] Anticoagulation: [phone]  with any changes, questions or concerns.      Usually with 2 high INRs we start to talk about decreasing the warfarin dose. Patient states he definitely did not eat as many greens as usual and would not like to adjust his dose.     Telephone call with Oh whom verbalizes understanding and agrees to plan    Instructed to call the Anticoagulation Clinic for any changes, questions or concerns. (#[phone])        Sophia Wei RN      OBJECTIVE:  Recent labs: (last 7 days)     06/14/21  1149   INR 3.30*         INR assessment SUPRA    Recheck INR In: 2 WEEKS    INR Location Clinic      Anticoagulation Summary  As of 6/14/2021    INR goal:  2.0-3.0   TTR:  44.3 % (1 y)   INR used for dosing:  3.30 (6/14/2021)   Warfarin maintenance plan:  5 mg (5 mg x 1) every day   Full warfarin instructions:  5 mg every day   Weekly warfarin total:  35 mg   No change documented:  Sophia Wei RN   Plan last modified:  Liat Huertas RN (1/19/2021)   Next INR check:  6/28/2021   Priority:  Maintenance   Target end date:  Indefinite    Indications    Coagulation defect (HCC) [D68.9]  [D68.9]  Factor V Leiden mutation (H) [D68.51]  Long-term (current) use of anticoagulants [Z79.01] [Z79.01]             Anticoagulation Episode Summary     INR check location:      Preferred lab:      Send INR reminders to:  JESSICA MOY    Comments:        Anticoagulation Care Providers     Provider Role Specialty Phone number    Karina Galvez PA-C Referring Family Medicine [phone]    Brendon Kay MD  Internal Medicine - Pediatrics [phone]

## 2021-06-15 ENCOUNTER — TELEPHONE (OUTPATIENT)
Dept: FAMILY MEDICINE | Facility: CLINIC | Age: 76
End: 2021-06-15

## 2021-06-15 DIAGNOSIS — D68.51 FACTOR V LEIDEN MUTATION (H): ICD-10-CM

## 2021-06-15 DIAGNOSIS — Z79.01 LONG TERM CURRENT USE OF ANTICOAGULANT THERAPY: ICD-10-CM

## 2021-06-15 DIAGNOSIS — G89.4 CHRONIC PAIN SYNDROME: Primary | ICD-10-CM

## 2021-06-15 DIAGNOSIS — E78.5 HYPERLIPIDEMIA LDL GOAL <100: ICD-10-CM

## 2021-06-15 NOTE — TELEPHONE ENCOUNTER
Reason for Call: Request for an order or referral:    Order or referral being requested: lab orders for complete labs before physical on 10/4    Date needed: within one week of phyiscal appt on 10/4    Has the patient been seen by the PCP for this problem? Not Applicable    Additional comments: Pt wanting to schedule labs prior to physical in October, no orders available so unable to schedule at this time. Please call patient to schedule.     Phone number Patient can be reached at:  Home number on file [phone] (home)    Best Time:  Anytime     Can we leave a detailed message on this number?  YES    Call taken on 6/15/2021 at 3:12 PM by Rob Briseno

## 2021-06-16 DIAGNOSIS — F51.01 PRIMARY INSOMNIA: ICD-10-CM

## 2021-06-17 NOTE — TELEPHONE ENCOUNTER
Routing refill request to provider for review/approval because:  Drug not on the [facility] refill protocol   Desire Chavez RN

## 2021-06-18 RX ORDER — ZOLPIDEM TARTRATE 10 MG/1
TABLET ORAL
Qty: 31 TABLET | Refills: 0 | Status: SHIPPED | OUTPATIENT
Start: 2021-06-18 | End: 2021-07-20

## 2021-06-28 ENCOUNTER — ANTICOAGULATION THERAPY VISIT (OUTPATIENT)
Dept: FAMILY MEDICINE | Facility: CLINIC | Age: 76
End: 2021-06-28

## 2021-06-28 DIAGNOSIS — Z79.01 LONG TERM CURRENT USE OF ANTICOAGULANT THERAPY: ICD-10-CM

## 2021-06-28 DIAGNOSIS — D68.9 COAGULATION DEFECT (H): ICD-10-CM

## 2021-06-28 DIAGNOSIS — D68.51 FACTOR V LEIDEN MUTATION (H): ICD-10-CM

## 2021-06-28 LAB
CAPILLARY BLOOD COLLECTION: NORMAL
INR BLD: 3.1 (ref 0.86–1.14)

## 2021-06-28 PROCEDURE — 36416 COLLJ CAPILLARY BLOOD SPEC: CPT | Performed by: INTERNAL MEDICINE

## 2021-06-28 PROCEDURE — 85610 PROTHROMBIN TIME: CPT | Performed by: INTERNAL MEDICINE

## 2021-06-28 NOTE — PROGRESS NOTES
ANTICOAGULATION MANAGEMENT     Oh Solares 76 year old male is on warfarin with supratherapeutic INR result. (Goal INR 2.0-3.0)    Recent labs: (last 7 days)     06/28/21  1328   INR 3.1*       ASSESSMENT     Source(s): Chart Review and Patient/Caregiver Call     Warfarin doses taken: Warfarin taken as instructed  Diet: Increased greens/vitamin K in diet; plans to resume previous intake  New illness, injury, or hospitalization: No  Medication/supplement changes: None noted  Signs or symptoms of bleeding or clotting: No  Previous INR: Supratherapeutic  Additional findings: None     PLAN     Recommended plan for no diet, medication or health factor changes affecting INR     Dosing Instructions:  Decrease your warfarin dose (7.1% change) with next INR in 2 weeks       Summary  As of 6/28/2021    Full warfarin instructions:  2.5 mg every Tue; 5 mg all other days   Next INR check:  7/12/2021             Telephone call with Oh whom verbalizes understanding and agrees to plan    Lab visit scheduled    Education provided: Importance of consistent vitamin K intake, Impact of vitamin K foods on INR, Target INR goal and significance of current INR result and Importance of therapeutic range    Plan made per [facility] anticoagulation protocol    Edgar Brower RN  Anticoagulation Clinic  6/28/2021    _______________________________________________________________________     Anticoagulation Episode Summary     Current INR goal:  2.0-3.0   TTR:  40.5 % (1 y)   Target end date:  Indefinite   Send INR reminders to:  JESSICA MOY    Indications    Coagulation defect (HCC) [D68.9] [D68.9]  Factor V Leiden mutation (H) [D68.51]  Long-term (current) use of anticoagulants [Z79.01] [Z79.01]           Comments:           Anticoagulation Care Providers     Provider Role Specialty Phone number    Karina Galvez PA-C Referring Family Medicine [phone]    Brendon Kay MD  Internal Medicine - Pediatrics [phone]

## 2021-07-06 DIAGNOSIS — M54.50 CHRONIC BILATERAL LOW BACK PAIN WITHOUT SCIATICA: ICD-10-CM

## 2021-07-06 DIAGNOSIS — G89.29 CHRONIC BILATERAL LOW BACK PAIN WITHOUT SCIATICA: ICD-10-CM

## 2021-07-06 DIAGNOSIS — G89.4 CHRONIC PAIN SYNDROME: ICD-10-CM

## 2021-07-06 NOTE — TELEPHONE ENCOUNTER
Reason for Call:  Medication or medication refill:    Do you use a [facility]?  Name of the pharmacy and phone number for the current request:  [facility] [address] [place] [facility], [place] [address] [address]    Name of the medication requested: HYDROcodone-acetaminophen (NORCO) 5-325 MG tablet    Other request:     Can we leave a detailed message on this number? YES    Phone number patient can be reached at: Home number on file [phone] (home)    Best Time: anytime    Call taken on 7/6/2021 at 1:32 PM by Marguerite Morgan

## 2021-07-06 NOTE — TELEPHONE ENCOUNTER
Requested Prescriptions   Pending Prescriptions Disp Refills     HYDROcodone-acetaminophen (NORCO) 5-325 MG tablet 30 tablet 0     Sig: Take 1 tablet by mouth every 6 hours as needed for pain       There is no refill protocol information for this order        Routing refill request to provider for review/approval because:  Drug not on the [facility] refill protocol

## 2021-07-08 RX ORDER — HYDROCODONE BITARTRATE AND ACETAMINOPHEN 5; 325 MG/1; MG/1
1 TABLET ORAL EVERY 6 HOURS PRN
Qty: 30 TABLET | Refills: 0 | Status: SHIPPED | OUTPATIENT
Start: 2021-07-08 | End: 2021-09-03

## 2021-07-12 ENCOUNTER — LAB (OUTPATIENT)
Dept: LAB | Facility: CLINIC | Age: 76
End: 2021-07-12
Payer: COMMERCIAL

## 2021-07-12 ENCOUNTER — ANTICOAGULATION THERAPY VISIT (OUTPATIENT)
Dept: FAMILY MEDICINE | Facility: CLINIC | Age: 76
End: 2021-07-12

## 2021-07-12 DIAGNOSIS — D68.9 COAGULATION DEFECT (H): ICD-10-CM

## 2021-07-12 DIAGNOSIS — D68.51 FACTOR V LEIDEN MUTATION (H): ICD-10-CM

## 2021-07-12 DIAGNOSIS — Z79.01 LONG TERM CURRENT USE OF ANTICOAGULANT THERAPY: ICD-10-CM

## 2021-07-12 DIAGNOSIS — D68.9 COAGULATION DEFECT (H): Primary | ICD-10-CM

## 2021-07-12 LAB — INR BLD: 2 (ref 0.9–1.1)

## 2021-07-12 PROCEDURE — 36416 COLLJ CAPILLARY BLOOD SPEC: CPT

## 2021-07-12 PROCEDURE — 85610 PROTHROMBIN TIME: CPT

## 2021-07-19 DIAGNOSIS — F51.01 PRIMARY INSOMNIA: ICD-10-CM

## 2021-07-19 NOTE — TELEPHONE ENCOUNTER
Routing refill request to provider for review/approval because:  Drug not on the [facility] refill protocol           Pending Prescriptions:                       Disp   Refills    zolpidem (AMBIEN) 10 MG tablet [Pharmacy M*31 tab*0        Sig: TAKE 1 TABLET BY MOUTH EVERY DAY AT BEDTIME AS NEEDED        David Resendiz RN

## 2021-07-20 RX ORDER — ZOLPIDEM TARTRATE 10 MG/1
TABLET ORAL
Qty: 31 TABLET | Refills: 0 | Status: SHIPPED | OUTPATIENT
Start: 2021-07-20 | End: 2021-09-13

## 2021-08-03 DIAGNOSIS — F41.9 ANXIETY: ICD-10-CM

## 2021-08-03 NOTE — TELEPHONE ENCOUNTER
Routing refill request to provider for review/approval because:  Drug not on the [facility] refill protocol           Pending Prescriptions:                       Disp   Refills    ALPRAZolam (XANAX) 0.25 MG tablet [Pharmac*30 tab*0        Sig: TAKE 1 TABLET BY MOUTH THREE TIMES A DAY AS NEEDED           FOR ANXIETY        Kit RUBENS Resendiz

## 2021-08-04 RX ORDER — ALPRAZOLAM 0.25 MG
TABLET ORAL
Qty: 30 TABLET | Refills: 0 | Status: SHIPPED | OUTPATIENT
Start: 2021-08-04 | End: 2021-08-20

## 2021-08-09 ENCOUNTER — LAB (OUTPATIENT)
Dept: LAB | Facility: CLINIC | Age: 76
End: 2021-08-09
Payer: COMMERCIAL

## 2021-08-09 ENCOUNTER — DOCUMENTATION ONLY (OUTPATIENT)
Dept: FAMILY MEDICINE | Facility: CLINIC | Age: 76
End: 2021-08-09

## 2021-08-09 ENCOUNTER — ANTICOAGULATION THERAPY VISIT (OUTPATIENT)
Dept: ANTICOAGULATION | Facility: CLINIC | Age: 76
End: 2021-08-09

## 2021-08-09 DIAGNOSIS — D68.51 FACTOR V LEIDEN MUTATION (H): ICD-10-CM

## 2021-08-09 DIAGNOSIS — Z79.01 LONG TERM CURRENT USE OF ANTICOAGULANT THERAPY: ICD-10-CM

## 2021-08-09 DIAGNOSIS — D68.9 COAGULATION DEFECT (H): Primary | ICD-10-CM

## 2021-08-09 DIAGNOSIS — D68.51 FACTOR V LEIDEN MUTATION (H): Primary | ICD-10-CM

## 2021-08-09 DIAGNOSIS — D68.9 COAGULATION DEFECT (H): ICD-10-CM

## 2021-08-09 LAB — INR BLD: 2.2 (ref 0.9–1.1)

## 2021-08-09 PROCEDURE — 85610 PROTHROMBIN TIME: CPT

## 2021-08-09 NOTE — PROGRESS NOTES
ANTICOAGULATION MANAGEMENT     Oh Solares 76 year old male is on warfarin with therapeutic INR result. (Goal INR 2.0-3.0)    Recent labs: (last 7 days)     08/09/21  0708   INR 2.2*       ASSESSMENT     Source(s): Chart Review and Patient/Caregiver Call       Warfarin doses taken: Warfarin taken as instructed    Diet: No new diet changes identified    New illness, injury, or hospitalization: No    Medication/supplement changes: None noted    Signs or symptoms of bleeding or clotting: No    Previous INR: Therapeutic last visit; previously outside of goal range    Additional findings: None     PLAN     Recommended plan for no diet, medication or health factor changes affecting INR     Dosing Instructions: Continue your current warfarin dose with next INR in 5 weeks       Summary  As of 8/9/2021    Full warfarin instructions:  2.5 mg every Tue; 5 mg all other days   Next INR check:  8/30/2021             Telephone call with Oh who verbalizes understanding and agrees to plan. Requested 6 wks apt instead of 5 wks    Lab visit scheduled    Education provided: Please call back if any changes to your diet, medications or how you've been taking warfarin    Plan made per [facility] anticoagulation protocol    Liss Guerra, RN  Anticoagulation Clinic  8/9/2021    _______________________________________________________________________     Anticoagulation Episode Summary     Current INR goal:  2.0-3.0   TTR:  47.3 % (1 y)   Target end date:  Indefinite   Send INR reminders to:  JESSICA MOY    Indications    Coagulation defect (HCC) [D68.9] [D68.9]  Factor V Leiden mutation (H) [D68.51]  Long-term (current) use of anticoagulants [Z79.01] [Z79.01]           Comments:           Anticoagulation Care Providers     Provider Role Specialty Phone number    Karina Galvez PA-C Referring Family Medicine [phone]    Brendon Kay MD  Internal Medicine - Pediatrics [phone]

## 2021-08-09 NOTE — PROGRESS NOTES
ANTICOAGULATION MANAGEMENT      Oh Solares due for annual renewal of referral to anticoagulation monitoring. Order pended for your review and signature.      ANTICOAGULATION SUMMARY      Warfarin indication(s)     DVT  Factor V     Heart valve present?  NO       Current goal range   INR: 2.0-3.0     Goal appropriate for indication? Yes, INR 2-3 appropriate for hx of DVT, PE, hypercoagulable state, Afib, LVAD, or bileaflet AVR without risk factors     Current duration of therapy Indefinite/long term therapy   Time in Therapeutic Range (TTR)  (Goal > 60%) 40.1%       Office visit with referring provider's group within last year yes on 9/27/20       Liss Guerra RN

## 2021-08-09 NOTE — PROGRESS NOTES
Anticoagulation Management    Unable to reach Ho today.    Today's INR result of 2.2 is therapeutic (goal INR of 2.0-3.0).  Result received from: Clinic Lab    Follow up required to confirm warfarin dose taken and assess for changes    left message to call nurse back today      Anticoagulation clinic to follow up    Liss Guerra RN

## 2021-08-19 DIAGNOSIS — F41.9 ANXIETY: ICD-10-CM

## 2021-08-20 RX ORDER — ALPRAZOLAM 0.25 MG
TABLET ORAL
Qty: 30 TABLET | Refills: 0 | Status: SHIPPED | OUTPATIENT
Start: 2021-08-20 | End: 2021-10-04

## 2021-09-03 DIAGNOSIS — G89.29 CHRONIC BILATERAL LOW BACK PAIN WITHOUT SCIATICA: ICD-10-CM

## 2021-09-03 DIAGNOSIS — M54.50 CHRONIC BILATERAL LOW BACK PAIN WITHOUT SCIATICA: ICD-10-CM

## 2021-09-03 DIAGNOSIS — G89.4 CHRONIC PAIN SYNDROME: ICD-10-CM

## 2021-09-03 RX ORDER — HYDROCODONE BITARTRATE AND ACETAMINOPHEN 5; 325 MG/1; MG/1
1 TABLET ORAL EVERY 6 HOURS PRN
Qty: 30 TABLET | Refills: 0 | Status: SHIPPED | OUTPATIENT
Start: 2021-09-03 | End: 2021-11-04

## 2021-09-03 NOTE — TELEPHONE ENCOUNTER
Received call from patient. He states that he needs a refill on HYDROcodone-acetaminophen (NORCO) 5-325 MG tablet. He is currently out of medication.    Routing refill request to provider for review/approval because:  Drug not on the [facility] refill protocol     REJI Bloom RN  [facility]

## 2021-09-11 DIAGNOSIS — F51.01 PRIMARY INSOMNIA: ICD-10-CM

## 2021-09-13 RX ORDER — ZOLPIDEM TARTRATE 10 MG/1
TABLET ORAL
Qty: 31 TABLET | Refills: 0 | Status: SHIPPED | OUTPATIENT
Start: 2021-09-13 | End: 2021-10-13

## 2021-09-20 ENCOUNTER — LAB (OUTPATIENT)
Dept: LAB | Facility: CLINIC | Age: 76
End: 2021-09-20
Payer: COMMERCIAL

## 2021-09-20 ENCOUNTER — ANTICOAGULATION THERAPY VISIT (OUTPATIENT)
Dept: ANTICOAGULATION | Facility: CLINIC | Age: 76
End: 2021-09-20

## 2021-09-20 DIAGNOSIS — Z79.01 LONG TERM CURRENT USE OF ANTICOAGULANT THERAPY: ICD-10-CM

## 2021-09-20 DIAGNOSIS — D68.9 COAGULATION DEFECT (H): ICD-10-CM

## 2021-09-20 DIAGNOSIS — D68.51 FACTOR V LEIDEN MUTATION (H): ICD-10-CM

## 2021-09-20 DIAGNOSIS — D68.9 COAGULATION DEFECT (H): Primary | ICD-10-CM

## 2021-09-20 LAB — INR BLD: 2.4 (ref 0.9–1.1)

## 2021-09-20 PROCEDURE — 36415 COLL VENOUS BLD VENIPUNCTURE: CPT

## 2021-09-20 PROCEDURE — 85610 PROTHROMBIN TIME: CPT

## 2021-09-20 NOTE — PROGRESS NOTES
Unable to reach Oh, dosing instructions left and instructed to make lab apt in 4 weeks.  Bony Browne Rn  [facility]

## 2021-09-20 NOTE — PROGRESS NOTES
Anticoagulation Management    Unable to reach yanely today.    Today's INR result of 2.4 is therapeutic (goal INR of 2.0-3.0).  Result received from: Clinic Lab    Follow up required to confirm warfarin dose taken and assess for changes    left message to call for dosing       Anticoagulation clinic to follow up    Ledy Mccall RN

## 2021-09-21 NOTE — PROGRESS NOTES
09/21/21  UPDATE: Patient called scheduling back on 9/20 to make an appointment for an INR check.    Armond HENDERSON RN

## 2021-10-03 DIAGNOSIS — F41.9 ANXIETY: ICD-10-CM

## 2021-10-04 ENCOUNTER — OFFICE VISIT (OUTPATIENT)
Dept: FAMILY MEDICINE | Facility: CLINIC | Age: 76
End: 2021-10-04
Payer: COMMERCIAL

## 2021-10-04 VITALS
HEART RATE: 65 BPM | OXYGEN SATURATION: 100 % | WEIGHT: 142.6 LBS | HEIGHT: 65 IN | DIASTOLIC BLOOD PRESSURE: 70 MMHG | BODY MASS INDEX: 23.76 KG/M2 | SYSTOLIC BLOOD PRESSURE: 125 MMHG | TEMPERATURE: 97.6 F

## 2021-10-04 DIAGNOSIS — E78.5 HYPERLIPIDEMIA LDL GOAL <100: ICD-10-CM

## 2021-10-04 DIAGNOSIS — Z79.01 LONG TERM CURRENT USE OF ANTICOAGULANT THERAPY: ICD-10-CM

## 2021-10-04 DIAGNOSIS — Z23 NEEDS FLU SHOT: ICD-10-CM

## 2021-10-04 DIAGNOSIS — F11.90 CHRONIC, CONTINUOUS USE OF OPIOIDS: ICD-10-CM

## 2021-10-04 DIAGNOSIS — G89.4 CHRONIC PAIN SYNDROME: ICD-10-CM

## 2021-10-04 DIAGNOSIS — Z13.6 CARDIOVASCULAR SCREENING; LDL GOAL LESS THAN 160: Primary | ICD-10-CM

## 2021-10-04 DIAGNOSIS — D68.51 FACTOR V LEIDEN MUTATION (H): ICD-10-CM

## 2021-10-04 LAB
ALBUMIN SERPL-MCNC: 3.8 G/DL (ref 3.4–5)
ALP SERPL-CCNC: 60 U/L (ref 40–150)
ALT SERPL W P-5'-P-CCNC: 31 U/L (ref 0–70)
AMPHETAMINES UR QL: NOT DETECTED
ANION GAP SERPL CALCULATED.3IONS-SCNC: 3 MMOL/L (ref 3–14)
AST SERPL W P-5'-P-CCNC: 20 U/L (ref 0–45)
BARBITURATES UR QL SCN: NOT DETECTED
BASOPHILS # BLD AUTO: 0 10E3/UL (ref 0–0.2)
BASOPHILS NFR BLD AUTO: 0 %
BENZODIAZ UR QL SCN: NOT DETECTED
BILIRUB SERPL-MCNC: 0.4 MG/DL (ref 0.2–1.3)
BUN SERPL-MCNC: 15 MG/DL (ref 7–30)
BUPRENORPHINE UR QL: NOT DETECTED
CALCIUM SERPL-MCNC: 9 MG/DL (ref 8.5–10.1)
CANNABINOIDS UR QL: NOT DETECTED
CHLORIDE BLD-SCNC: 105 MMOL/L (ref 94–109)
CHOLEST SERPL-MCNC: 239 MG/DL
CO2 SERPL-SCNC: 30 MMOL/L (ref 20–32)
COCAINE UR QL SCN: NOT DETECTED
CREAT SERPL-MCNC: 1.02 MG/DL (ref 0.66–1.25)
D-METHAMPHET UR QL: NOT DETECTED
EOSINOPHIL # BLD AUTO: 0.3 10E3/UL (ref 0–0.7)
EOSINOPHIL NFR BLD AUTO: 7 %
ERYTHROCYTE [DISTWIDTH] IN BLOOD BY AUTOMATED COUNT: 15 % (ref 10–15)
FASTING STATUS PATIENT QL REPORTED: YES
GFR SERPL CREATININE-BSD FRML MDRD: 72 ML/MIN/1.73M2
GLUCOSE BLD-MCNC: 107 MG/DL (ref 70–99)
HCT VFR BLD AUTO: 46 % (ref 40–53)
HDLC SERPL-MCNC: 94 MG/DL
HGB BLD-MCNC: 14.9 G/DL (ref 13.3–17.7)
LDLC SERPL CALC-MCNC: 123 MG/DL
LYMPHOCYTES # BLD AUTO: 1 10E3/UL (ref 0.8–5.3)
LYMPHOCYTES NFR BLD AUTO: 21 %
MCH RBC QN AUTO: 29.6 PG (ref 26.5–33)
MCHC RBC AUTO-ENTMCNC: 32.4 G/DL (ref 31.5–36.5)
MCV RBC AUTO: 91 FL (ref 78–100)
METHADONE UR QL SCN: NOT DETECTED
MONOCYTES # BLD AUTO: 0.5 10E3/UL (ref 0–1.3)
MONOCYTES NFR BLD AUTO: 11 %
NEUTROPHILS # BLD AUTO: 2.9 10E3/UL (ref 1.6–8.3)
NEUTROPHILS NFR BLD AUTO: 61 %
NONHDLC SERPL-MCNC: 145 MG/DL
OPIATES UR QL SCN: NOT DETECTED
OXYCODONE UR QL SCN: NOT DETECTED
PCP UR QL SCN: NOT DETECTED
PLATELET # BLD AUTO: 207 10E3/UL (ref 150–450)
POTASSIUM BLD-SCNC: 4.6 MMOL/L (ref 3.4–5.3)
PROPOXYPH UR QL: NOT DETECTED
PROT SERPL-MCNC: 7.2 G/DL (ref 6.8–8.8)
RBC # BLD AUTO: 5.04 10E6/UL (ref 4.4–5.9)
SODIUM SERPL-SCNC: 138 MMOL/L (ref 133–144)
TRICYCLICS UR QL SCN: NOT DETECTED
TRIGL SERPL-MCNC: 111 MG/DL
TSH SERPL DL<=0.005 MIU/L-ACNC: 2.54 MU/L (ref 0.4–4)
WBC # BLD AUTO: 4.8 10E3/UL (ref 4–11)

## 2021-10-04 PROCEDURE — 85025 COMPLETE CBC W/AUTO DIFF WBC: CPT | Performed by: INTERNAL MEDICINE

## 2021-10-04 PROCEDURE — 99397 PER PM REEVAL EST PAT 65+ YR: CPT | Mod: 25 | Performed by: INTERNAL MEDICINE

## 2021-10-04 PROCEDURE — 84443 ASSAY THYROID STIM HORMONE: CPT | Performed by: INTERNAL MEDICINE

## 2021-10-04 PROCEDURE — 36415 COLL VENOUS BLD VENIPUNCTURE: CPT | Performed by: INTERNAL MEDICINE

## 2021-10-04 PROCEDURE — 80306 DRUG TEST PRSMV INSTRMNT: CPT | Performed by: INTERNAL MEDICINE

## 2021-10-04 PROCEDURE — 80053 COMPREHEN METABOLIC PANEL: CPT | Performed by: INTERNAL MEDICINE

## 2021-10-04 PROCEDURE — 90662 IIV NO PRSV INCREASED AG IM: CPT | Performed by: INTERNAL MEDICINE

## 2021-10-04 PROCEDURE — G0008 ADMIN INFLUENZA VIRUS VAC: HCPCS | Performed by: INTERNAL MEDICINE

## 2021-10-04 PROCEDURE — 80061 LIPID PANEL: CPT | Performed by: INTERNAL MEDICINE

## 2021-10-04 RX ORDER — ALPRAZOLAM 0.25 MG
TABLET ORAL
Qty: 30 TABLET | Refills: 0 | Status: SHIPPED | OUTPATIENT
Start: 2021-10-04 | End: 2021-12-06

## 2021-10-04 ASSESSMENT — ACTIVITIES OF DAILY LIVING (ADL): CURRENT_FUNCTION: NO ASSISTANCE NEEDED

## 2021-10-04 ASSESSMENT — MIFFLIN-ST. JEOR: SCORE: 1313.09

## 2021-10-04 NOTE — LETTER
October 8, 2021      Oh Madridkapilmeena  [address]  [place] [address]        Dear ,    We are writing to inform you of your test results.    Overall, labs are stable.  The cholesterol is at a level that would benefit from a low dose of statin medication like atorvastatin.  Let me know if this is a treatment you are interested in         Resulted Orders   Drug Abuse Screen Panel 13, Urine (Pain Care Package) - lab collect   Result Value Ref Range    Cannabinoids (96-yla-8-carboxy-9-THC) Not Detected Not Detected, Indeterminate      Comment:      Cutoff for a negative cannabinoid is 50 ng/mL or less.    Phencyclidine Not Detected Not Detected, Indeterminate      Comment:      Cutoff for a negative PCP is 25 ng/mL or less.    Cocaine (Benzoylecgonine) Not Detected Not Detected, Indeterminate      Comment:      Cutoff for a negative cocaine is 150 ng/ml or less.    Methamphetamine (d-Methamphetamine) Not Detected Not Detected, Indeterminate      Comment:      Cutoff for a negative methamphetamine is 500 ng/ml or less.    Opiates (Morphine) Not Detected Not Detected, Indeterminate      Comment:      Cutoff for a negative opiate is 100 ng/ml or less.    Amphetamine (d-Amphetamine) Not Detected Not Detected, Indeterminate      Comment:      Cutoff for a negative amphetamine is 500 ng/mL or less.    Benzodiazepines (Nordiazepam) Not Detected Not Detected, Indeterminate      Comment:      Cutoff for a negative benzodiazepine is 150 ng/ml or less.    Tricyclic Antidepressants (Desipramine) Not Detected Not Detected, Indeterminate      Comment:      Cutoff for a negative tricyclic antidepressant is 300 ng/ml or less.    Methadone Not Detected Not Detected, Indeterminate      Comment:      Cutoff for a negative methadone is 200 ng/ml or less.    Barbiturates (Butalbital) Not Detected Not Detected, Indeterminate      Comment:      Cutoff for a negative barbituate is 200 ng/ml or less.    Oxycodone Not  Detected Not Detected, Indeterminate      Comment:      Cutoff for a negative oxycodone is 100 ng/mL or less.    Propoxyphene (Norpropoxyphene) Not Detected Not Detected, Indeterminate      Comment:      Cutoff for a negative propoxyphene is 300 ng/ml or less.    Buprenorphine Not Detected Not Detected, Indeterminate      Comment:      Cutoff for a negative buprenorphine is 10 ng/ml or less.   Comprehensive metabolic panel (BMP + Alb, Alk Phos, ALT, AST, Total. Bili, TP)   Result Value Ref Range    Sodium 138 133 - 144 mmol/L    Potassium 4.6 3.4 - 5.3 mmol/L    Chloride 105 94 - 109 mmol/L    Carbon Dioxide (CO2) 30 20 - 32 mmol/L    Anion Gap 3 3 - 14 mmol/L    Urea Nitrogen 15 7 - 30 mg/dL    Creatinine 1.02 0.66 - 1.25 mg/dL    Calcium 9.0 8.5 - 10.1 mg/dL    Glucose 107 (H) 70 - 99 mg/dL    Alkaline Phosphatase 60 40 - 150 U/L    AST 20 0 - 45 U/L    ALT 31 0 - 70 U/L    Protein Total 7.2 6.8 - 8.8 g/dL    Albumin 3.8 3.4 - 5.0 g/dL    Bilirubin Total 0.4 0.2 - 1.3 mg/dL    GFR Estimate 72 >60 mL/min/1.73m2      Comment:      As of July 11, 2021, eGFR is calculated by the CKD-EPI creatinine equation, without race adjustment. eGFR can be influenced by muscle mass, exercise, and diet. The reported eGFR is an estimation only and is only applicable if the renal function is stable.   Lipid panel reflex to direct LDL Fasting   Result Value Ref Range    Cholesterol 239 (H) <200 mg/dL    Triglycerides 111 <150 mg/dL    Direct Measure HDL 94 >=40 mg/dL    LDL Cholesterol Calculated 123 (H) <=100 mg/dL    Non HDL Cholesterol 145 (H) <130 mg/dL    Patient Fasting > 8hrs? Yes     Narrative    Cholesterol  Desirable:  <200 mg/dL    Triglycerides  Normal:  Less than 150 mg/dL  Borderline High:  150-199 mg/dL  High:  200-499 mg/dL  Very High:  Greater than or equal to 500 mg/dL    Direct Measure HDL  Female:  Greater than or equal to 50 mg/dL   Male:  Greater than or equal to 40 mg/dL    LDL Cholesterol  Desirable:   <100mg/dL  Above Desirable:  100-129 mg/dL   Borderline High:  130-159 mg/dL   High:  160-189 mg/dL   Very High:  >= 190 mg/dL    Non HDL Cholesterol  Desirable:  130 mg/dL  Above Desirable:  130-159 mg/dL  Borderline High:  160-189 mg/dL  High:  190-219 mg/dL  Very High:  Greater than or equal to 220 mg/dL   **TSH with free T4 reflex FUTURE anytime   Result Value Ref Range    TSH 2.54 0.40 - 4.00 mU/L   CBC with platelets and differential   Result Value Ref Range    WBC Count 4.8 4.0 - 11.0 10e3/uL    RBC Count 5.04 4.40 - 5.90 10e6/uL    Hemoglobin 14.9 13.3 - 17.7 g/dL    Hematocrit 46.0 40.0 - 53.0 %    MCV 91 78 - 100 fL    MCH 29.6 26.5 - 33.0 pg    MCHC 32.4 31.5 - 36.5 g/dL    RDW 15.0 10.0 - 15.0 %    Platelet Count 207 150 - 450 10e3/uL    % Neutrophils 61 %    % Lymphocytes 21 %    % Monocytes 11 %    % Eosinophils 7 %    % Basophils 0 %    Absolute Neutrophils 2.9 1.6 - 8.3 10e3/uL    Absolute Lymphocytes 1.0 0.8 - 5.3 10e3/uL    Absolute Monocytes 0.5 0.0 - 1.3 10e3/uL    Absolute Eosinophils 0.3 0.0 - 0.7 10e3/uL    Absolute Basophils 0.0 0.0 - 0.2 10e3/uL       If you have any questions or concerns, please call the clinic at the number listed above.       Sincerely,      Brendon Kay MD/scott

## 2021-10-04 NOTE — PROGRESS NOTES
"SUBJECTIVE:   Oh Solares is a 76 year old male who presents for Preventive Visit.      Patient has been advised of split billing requirements and indicates understanding: Yes   Are you in the first 12 months of your Medicare coverage?  No    Healthy Habits:    In general, how would you rate your overall health?  Excellent    Frequency of exercise:  2-3 days/week    Duration of exercise:  30-45 minutes    Do you usually eat at least 4 servings of fruit and vegetables a day, include whole grains    & fiber and avoid regularly eating high fat or \"junk\" foods?  No    Taking medications regularly:  Yes    Barriers to taking medications:  Not applicable    Medication side effects:  None    Ability to successfully perform activities of daily living:  No assistance needed    Home Safety:  No safety concerns identified    Hearing Impairment:  No hearing concerns    In the past 6 months, have you been bothered by leaking of urine?  No    In general, how would you rate your overall mental or emotional health?  Very good      PHQ-2 Total Score:    Additional concerns today:  No    Do you feel safe in your environment? Yes    Have you ever done Advance Care Planning? (For example, a Health Directive, POLST, or a discussion with a medical provider or your loved ones about your wishes): No, advance care planning information given to patient to review.  Advanced care planning was discussed at today's visit.     alprazolam and xanax    Fall risk  Fallen 2 or more times in the past year?: No  Any fall with injury in the past year?: No    Cognitive Screening   1) Repeat 3 items (Leader, Season, Table)     2) Clock draw: ABNORMAL   3) 3 item recall: Recalls 3 objects  Results: NORMAL clock, 1-2 items recalled: COGNITIVE IMPAIRMENT LESS LIKELY    Mini-CogTM Copyright ELAINE Mike. Licensed by the author for use in [facility]; reprinted with permission (eamon@.[facility]). All rights reserved.      Do you have sleep apnea, " excessive snoring or daytime drowsiness?: no    Reviewed and updated as needed this visit by clinical staff  Tobacco  Allergies  Meds   Med Hx  Surg Hx  Fam Hx  Soc Hx        Reviewed and updated as needed this visit by Provider                Social History     Tobacco Use     Smoking status: Former Smoker     Quit date: 4/15/1984     Years since quittin.4     Smokeless tobacco: Never Used   Substance Use Topics     Alcohol use: Yes     Comment: 1 beer every  day     If you drink alcohol do you typically have >3 drinks per day or >7 drinks per week? No    Alcohol Use 10/4/2021   Prescreen: >3 drinks/day or >7 drinks/week? -   Prescreen: >3 drinks/day or >7 drinks/week? No       Addressed medication effects of cognitive abilities    Also anxiety/stress related to wife's illness        Current providers sharing in care for this patient include:   Patient Care Team:  Brendon Kay MD as PCP - General  Brendon Kay MD as Assigned PCP    The following health maintenance items are reviewed in Epic and correct as of today:  Health Maintenance Due   Topic Date Due     PHQ-2  2021     Lab work is in process  Labs reviewed in EPIC  BP Readings from Last 3 Encounters:   10/04/21 125/70   20 121/63   20 132/68    Wt Readings from Last 3 Encounters:   10/04/21 64.7 kg (142 lb 9.6 oz)   20 67.1 kg (148 lb)   19 66.2 kg (146 lb)                  Patient Active Problem List   Diagnosis     Factor V Leiden mutation (H)     GI bleed     ED (erectile dysfunction)     CARDIOVASCULAR SCREENING; LDL GOAL LESS THAN 160     Advanced directives, counseling/discussion     Sleep stage dysfunction     Anxiety     Dupuytren's contracture - left     Chronic pain     Long-term (current) use of anticoagulants [Z79.01]     Coagulation defect (HCC) [D68.9]     Chronic pain syndrome     Past Surgical History:   Procedure Laterality Date     C VENOUS THROMBOSIS STUDY       CLOSED RX ELBOW DISLOCATION   96    left     COLECTOMY  2002     COLONOSCOPY       COLONOSCOPY WITH CO2 INSUFFLATION N/A 9/3/2014    Procedure: COLONOSCOPY WITH CO2 INSUFFLATION;  Surgeon: West Jerez MD;  Location: [facility] OR     COLONOSCOPY WITH CO2 INSUFFLATION N/A 2020    Procedure: COLONOSCOPY, WITH CO2 INSUFFLATION;  Surgeon: Jonathan Rivera MD;  Location: [facility] OR     SMALL BOWEL RESECTION      partial     VASECTOMY         Social History     Tobacco Use     Smoking status: Former Smoker     Quit date: 4/15/1984     Years since quittin.4     Smokeless tobacco: Never Used   Substance Use Topics     Alcohol use: Yes     Comment: 1 beer every  day     History reviewed. No pertinent family history.      Current Outpatient Medications   Medication Sig Dispense Refill     HYDROcodone-acetaminophen (NORCO) 5-325 MG tablet Take 1 tablet by mouth every 6 hours as needed for pain 30 tablet 0     Pediatric Multiple Vit-Vit C (VITAMIN DAILY PO) Take 500 mg by mouth daily.       saw palmetto ([employer] SAW PALMETTO) 160 MG CAPS Take 3 capsules by mouth daily. 1 capsule 12     TYLENOL 325 MG OR TABS as needed       warfarin ANTICOAGULANT (COUMADIN) 5 MG tablet TAKE 7.5 MG EVERY MON AND 5 MG ALL OTHER DAYS OR AS DIRECTED BY THE INR CLINIC 100 tablet 4     zolpidem (AMBIEN) 10 MG tablet TAKE 1 TABLET BY MOUTH EVERY DAY AT BEDTIME AS NEEDED 31 tablet 0     ALPRAZolam (XANAX) 0.25 MG tablet TAKE 1 TABLET BY MOUTH THREE TIMES A DAY AS NEEDED FOR ANXIETY 30 tablet 0     bisacodyl (DULCOLAX) 5 MG EC tablet Take 1 tablet (5 mg) by mouth See Admin Instructions --Day prior to procedure: Take 1 tablet bisacodyl at 12:00. (Patient not taking: Reported on 10/4/2021) 1 tablet 0     Na Sulfate-K Sulfate-Mg Sulf (SUPREP BOWEL PREP) solution Take 177 mLs (1 Bottle) by mouth See Admin Instructions -Evening before procedure complete steps 1 through 4 (see package) using (1) 6 ounce bottle before bed.  Morning of procedure, repeat steps 1 through 4  "using the other 6 ounce bottle. (Patient not taking: Reported on 10/4/2021) 2 Bottle 0     polyethylene glycol (GOLYTELY) 236 g suspension Take 4,000 mLs (4 L) by mouth See Admin Instructions -Day prior to procedure: 1. Take 1 tablet bisacodyl at 12:00.  2. Mix GoLytely as directed on container.  3.  At 6:00 PM, drink an 8 oz. glass of solution and continue drinking an 8 oz. glass every 15 minutes until bottle is empty. (Patient not taking: Reported on 10/4/2021) 4 L 0     Allergies   Allergen Reactions     Penicillins Rash             Review of Systems  Constitutional, HEENT, cardiovascular, pulmonary, gi and gu systems are negative, except as otherwise noted.    OBJECTIVE:   /70   Pulse 65   Temp 97.6  F (36.4  C) (Temporal)   Ht 1.65 m (5' 4.96\")   Wt 64.7 kg (142 lb 9.6 oz)   SpO2 100%   BMI 23.76 kg/m   Estimated body mass index is 23.76 kg/m  as calculated from the following:    Height as of this encounter: 1.65 m (5' 4.96\").    Weight as of this encounter: 64.7 kg (142 lb 9.6 oz).  Physical Exam  GENERAL: healthy, alert and no distress  EYES: Eyes grossly normal to inspection, PERRL and conjunctivae and sclerae normal  HENT: ear canals and TM's normal, nose and mouth without ulcers or lesions  NECK: no adenopathy, no asymmetry, masses, or scars and thyroid normal to palpation  RESP: lungs clear to auscultation - no rales, rhonchi or wheezes  CV: regular rate and rhythm, normal S1 S2, no S3 or S4, no murmur, click or rub, no peripheral edema and peripheral pulses strong  ABDOMEN: soft, nontender, no hepatosplenomegaly, no masses and bowel sounds normal  MS: no gross musculoskeletal defects noted, no edema  SKIN: no suspicious lesions or rashes  NEURO: Normal strength and tone, mentation intact and speech normal  BACK: no CVA tenderness, no paralumbar tenderness  PSYCH: mentation appears normal, affect normal/bright  LYMPH: no cervical, supraclavicular, axillary, or inguinal adenopathy    Diagnostic " Test Results:  Labs reviewed in Epic  Results for orders placed or performed in visit on 10/04/21   Drug Abuse Screen Panel 13, Urine (Pain Care Package) - lab collect     Status: Normal   Result Value Ref Range    Cannabinoids (39-gcb-7-carboxy-9-THC) Not Detected Not Detected, Indeterminate    Phencyclidine Not Detected Not Detected, Indeterminate    Cocaine (Benzoylecgonine) Not Detected Not Detected, Indeterminate    Methamphetamine (d-Methamphetamine) Not Detected Not Detected, Indeterminate    Opiates (Morphine) Not Detected Not Detected, Indeterminate    Amphetamine (d-Amphetamine) Not Detected Not Detected, Indeterminate    Benzodiazepines (Nordiazepam) Not Detected Not Detected, Indeterminate    Tricyclic Antidepressants (Desipramine) Not Detected Not Detected, Indeterminate    Methadone Not Detected Not Detected, Indeterminate    Barbiturates (Butalbital) Not Detected Not Detected, Indeterminate    Oxycodone Not Detected Not Detected, Indeterminate    Propoxyphene (Norpropoxyphene) Not Detected Not Detected, Indeterminate    Buprenorphine Not Detected Not Detected, Indeterminate   Comprehensive metabolic panel (BMP + Alb, Alk Phos, ALT, AST, Total. Bili, TP)     Status: Abnormal   Result Value Ref Range    Sodium 138 133 - 144 mmol/L    Potassium 4.6 3.4 - 5.3 mmol/L    Chloride 105 94 - 109 mmol/L    Carbon Dioxide (CO2) 30 20 - 32 mmol/L    Anion Gap 3 3 - 14 mmol/L    Urea Nitrogen 15 7 - 30 mg/dL    Creatinine 1.02 0.66 - 1.25 mg/dL    Calcium 9.0 8.5 - 10.1 mg/dL    Glucose 107 (H) 70 - 99 mg/dL    Alkaline Phosphatase 60 40 - 150 U/L    AST 20 0 - 45 U/L    ALT 31 0 - 70 U/L    Protein Total 7.2 6.8 - 8.8 g/dL    Albumin 3.8 3.4 - 5.0 g/dL    Bilirubin Total 0.4 0.2 - 1.3 mg/dL    GFR Estimate 72 >60 mL/min/1.73m2   Lipid panel reflex to direct LDL Fasting     Status: Abnormal   Result Value Ref Range    Cholesterol 239 (H) <200 mg/dL    Triglycerides 111 <150 mg/dL    Direct Measure HDL 94 >=40  mg/dL    LDL Cholesterol Calculated 123 (H) <=100 mg/dL    Non HDL Cholesterol 145 (H) <130 mg/dL    Patient Fasting > 8hrs? Yes     Narrative    Cholesterol  Desirable:  <200 mg/dL    Triglycerides  Normal:  Less than 150 mg/dL  Borderline High:  150-199 mg/dL  High:  200-499 mg/dL  Very High:  Greater than or equal to 500 mg/dL    Direct Measure HDL  Female:  Greater than or equal to 50 mg/dL   Male:  Greater than or equal to 40 mg/dL    LDL Cholesterol  Desirable:  <100mg/dL  Above Desirable:  100-129 mg/dL   Borderline High:  130-159 mg/dL   High:  160-189 mg/dL   Very High:  >= 190 mg/dL    Non HDL Cholesterol  Desirable:  130 mg/dL  Above Desirable:  130-159 mg/dL  Borderline High:  160-189 mg/dL  High:  190-219 mg/dL  Very High:  Greater than or equal to 220 mg/dL   **CBC with platelets differential FUTURE 2mo     Status: None    Narrative    The following orders were created for panel order **CBC with platelets differential FUTURE 2mo.  Procedure                               Abnormality         Status                     ---------                               -----------         ------                     CBC with platelets and d...[[id number]]                      Final result                 Please view results for these tests on the individual orders.   **TSH with free T4 reflex FUTURE anytime     Status: Normal   Result Value Ref Range    TSH 2.54 0.40 - 4.00 mU/L   CBC with platelets and differential     Status: None   Result Value Ref Range    WBC Count 4.8 4.0 - 11.0 10e3/uL    RBC Count 5.04 4.40 - 5.90 10e6/uL    Hemoglobin 14.9 13.3 - 17.7 g/dL    Hematocrit 46.0 40.0 - 53.0 %    MCV 91 78 - 100 fL    MCH 29.6 26.5 - 33.0 pg    MCHC 32.4 31.5 - 36.5 g/dL    RDW 15.0 10.0 - 15.0 %    Platelet Count 207 150 - 450 10e3/uL    % Neutrophils 61 %    % Lymphocytes 21 %    % Monocytes 11 %    % Eosinophils 7 %    % Basophils 0 %    Absolute Neutrophils 2.9 1.6 - 8.3 10e3/uL    Absolute Lymphocytes 1.0 0.8  "- 5.3 10e3/uL    Absolute Monocytes 0.5 0.0 - 1.3 10e3/uL    Absolute Eosinophils 0.3 0.0 - 0.7 10e3/uL    Absolute Basophils 0.0 0.0 - 0.2 10e3/uL       ASSESSMENT / PLAN:   Oh was seen today for physical.    Diagnoses and all orders for this visit:    CARDIOVASCULAR SCREENING; LDL GOAL LESS THAN 160  -     Lipid panel reflex to direct LDL Fasting; Future  -     Comprehensive metabolic panel (BMP + Alb, Alk Phos, ALT, AST, Total. Bili, TP); Future  -     Comprehensive metabolic panel (BMP + Alb, Alk Phos, ALT, AST, Total. Bili, TP)  -     Lipid panel reflex to direct LDL Fasting    Long-term (current) use of anticoagulants [Z79.01]    Chronic, continuous use of opioids    Chronic pain syndrome  -     Drug Abuse Screen Panel 13, Urine (Pain Care Package) - lab collect; Future  -     Drug Abuse Screen Panel 13, Urine (Pain Care Package) - lab collect  -     Cancel: Urine Drugs of Abuse Screen Panel 13  -     Cancel: **Basic metabolic panel FUTURE 2mo    Needs flu shot  -     REVIEW OF HEALTH MAINTENANCE PROTOCOL ORDERS  -     INFLUENZA, QUAD, HD, PF, 65+ (FLUZONE HD)    Factor V Leiden mutation (H)  -     **CBC with platelets differential FUTURE 2mo    Hyperlipidemia LDL goal <100  -     **TSH with free T4 reflex FUTURE anytime        Patient has been advised of split billing requirements and indicates understanding: Yes  COUNSELING:  Reviewed preventive health counseling, as reflected in patient instructions       Regular exercise       Healthy diet/nutrition       Vision screening       Hearing screening       Dental care       Bladder control       Colon cancer screening    Estimated body mass index is 23.76 kg/m  as calculated from the following:    Height as of this encounter: 1.65 m (5' 4.96\").    Weight as of this encounter: 64.7 kg (142 lb 9.6 oz).    Weight management plan: Discussed healthy diet and exercise guidelines    He reports that he quit smoking about 37 years ago. He has never used smokeless " tobacco.      Appropriate preventive services were discussed with this patient, including applicable screening as appropriate for cardiovascular disease, diabetes, osteopenia/osteoporosis, and glaucoma.  As appropriate for age/gender, discussed screening for colorectal cancer, prostate cancer, breast cancer, and cervical cancer. Checklist reviewing preventive services available has been given to the patient.    Reviewed patients plan of care and provided an AVS. The Basic Care Plan (routine screening as documented in Health Maintenance) for Oh meets the Care Plan requirement. This Care Plan has been established and reviewed with the Patient.    ICD-10-CM    1. CARDIOVASCULAR SCREENING; LDL GOAL LESS THAN 160  Z13.6 Lipid panel reflex to direct LDL Fasting     Comprehensive metabolic panel (BMP + Alb, Alk Phos, ALT, AST, Total. Bili, TP)     Comprehensive metabolic panel (BMP + Alb, Alk Phos, ALT, AST, Total. Bili, TP)     Lipid panel reflex to direct LDL Fasting   2. Long-term (current) use of anticoagulants [Z79.01]  Z79.01    3. Chronic, continuous use of opioids  F11.90    4. Chronic pain syndrome  G89.4 Drug Abuse Screen Panel 13, Urine (Pain Care Package) - lab collect     Drug Abuse Screen Panel 13, Urine (Pain Care Package) - lab collect     CANCELED: Urine Drugs of Abuse Screen Panel 13     CANCELED: **Basic metabolic panel FUTURE 2mo   5. Needs flu shot  Z23 REVIEW OF HEALTH MAINTENANCE PROTOCOL ORDERS     INFLUENZA, QUAD, HD, PF, 65+ (FLUZONE HD)   6. Factor V Leiden mutation (H)  D68.51 **CBC with platelets differential FUTURE 2mo   7. Hyperlipidemia LDL goal <100  E78.5 **TSH with free T4 reflex FUTURE anytime     Reviewed the need to transition off the xanax and ambien over time   Reviewed lexapro and other options   Patient does not want changes at this time due to wife's condition  Reviewed the BEERs recommendations and FDA warnings with the patient      Counseling Resources:  ATP IV  Guidelines  Pooled Cohorts Equation Calculator  Breast Cancer Risk Calculator  Breast Cancer: Medication to Reduce Risk  FRAX Risk Assessment  ICSI Preventive Guidelines  Dietary Guidelines for Americans, 2010  [employer]s MyPlate  ASA Prophylaxis  Lung CA Screening    Brendon Kay MD  [facility]    Identified Health Risks:

## 2021-10-04 NOTE — TELEPHONE ENCOUNTER
Routing refill request to provider for review/approval because:  Drug not on the [facility] refill protocol     Liliana Bear RN  [facility]

## 2021-10-04 NOTE — LETTER
Opioid / Opioid Plus Controlled Substance Agreement    This is an agreement between you and your provider about the safe and appropriate use of controlled substance/opioids prescribed by your care team. Controlled substances are medicines that can cause physical and mental dependence (abuse).    There are strict laws about having and using these medicines. We here at [facility] are committing to working with you in your efforts to get better. To support you in this work, we ll help you schedule regular office appointments for medicine refills. If we must cancel or change your appointment for any reason, we ll make sure you have enough medicine to last until your next appointment.     As a Provider, I will:    Listen carefully to your concerns and treat you with respect.     Recommend a treatment plan that I believe is in your best interest. This plan may involve therapies other than opioid pain medication.     Talk with you often about the possible benefits, and the risk of harm of any medicine that we prescribe for you.     Provide a plan on how to taper (discontinue or go off) using this medicine if the decision is made to stop its use.    As a Patient, I understand that opioid(s):     Are a controlled substance prescribed by my care team to help me function or work and manage my condition(s).     Are strong medicines and can cause serious side effects such as:    Drowsiness, which can seriously affect my driving ability    A lower breathing rate, enough to cause death    Harm to my thinking ability     Depression     Abuse of and addiction to this medicine    Need to be taken exactly as prescribed. Combining opioids with certain medicines or chemicals (such as illegal drugs, sedatives, sleeping pills, and benzodiazepines) can be dangerous or even fatal. If I stop opioids suddenly, I may have severe withdrawal symptoms.    Do not work for all types of pain nor for all patients. If they re not helpful, I may  be asked to stop them.        The risks, benefits and side effects of these medicine(s) were explained to me. I agree that:  1. I will take part in other treatments as advised by my care team. This may be psychiatry or counseling, physical therapy, behavioral therapy, group treatment or a referral to a specialist.     2. I will keep all my appointments. I understand that this is part of the monitoring of opioids. My care team may require an office visit for EVERY opioid/controlled substance refill. If I miss appointments or don t follow instructions, my care team may stop my medicine.    3. I will take my medicines as prescribed. I will not change the dose or schedule unless my care team tells me to. There will be no refills if I run out early.     4. I may be asked to come to the clinic and complete a urine drug test or complete a pill count at any time. If I don t give a urine sample or participate in a pill count, the care team may stop my medicine.    5. I will only receive prescriptions from this clinic for chronic pain. If I am treated by another provider for acute pain issues, I will tell them that I am taking opioid pain medication for chronic pain and that I have a treatment agreement with this provider. I will inform my [facility] care team within one business day if I am given a prescription for any pain medication by another healthcare provider. My [facility] care team can contact other providers and pharmacists about my use of any medicines.    6. It is up to me to make sure that I don t run out of my medicines on weekends or holidays. If my care team is willing to refill my opioid prescription without a visit, I must request refills only during office hours. Refills may take up to 3 business days to process. I will use one pharmacy to fill all my opioid and other controlled substance prescriptions. I will notify the clinic about any changes to my insurance or medication  availability.    7. I am responsible for my prescriptions. If the medicine/prescription is lost, stolen or destroyed, it will not be replaced. I also agree not to share controlled substance medicines with anyone.    8. I am aware I should not use any illegal or recreational drugs. I agree not to drink alcohol unless my care team says I can.       9. If I enroll in the [place] [facility] program, I will tell my care team prior to my next refill.     10. I will tell my care team right away if I become pregnant, have a new medical problem treated outside of my regular clinic, or have a change in my medications.    11. I understand that this medicine can affect my thinking, judgment and reaction time. Alcohol and drugs affect the brain and body, which can affect the safety of my driving. Being under the influence of alcohol or drugs can affect my decision-making, behaviors, personal safety, and the safety of others. Driving while impaired (DWI) can occur if a person is driving, operating, or in physical control of a car, motorcycle, boat, snowmobile, ATV, motorbike, off-road vehicle, or any other motor vehicle ([place] Statute 169A.20). I understand the risk if I choose to drive or operate any vehicle or machinery.    I understand that if I do not follow any of the conditions above, my prescriptions or treatment may be stopped or changed.          Opioids  What You Need to Know    What are opioids?   Opioids are pain medicines that must be prescribed by a doctor. They are also known as narcotics.     Examples are:   1. morphine (MS Contin, Isis)  2. oxycodone (Oxycontin)  3. oxycodone and acetaminophen (Percocet)  4. hydrocodone and acetaminophen (Vicodin, Norco)   5. fentanyl patch (Duragesic)   6. hydromorphone (Dilaudid)   7. methadone  8. codeine (Tylenol #3)     What do opioids do well?   Opioids are best for severe short-term pain such as after a surgery or injury. They may work well for cancer pain. They may  help some people with long-lasting (chronic) pain.     What do opioids NOT do well?   Opioids never get rid of pain entirely, and they don t work well for most patients with chronic pain. Opioids don t reduce swelling, one of the causes of pain.                                    Other ways to manage chronic pain and improve function include:       Treat the health problem that may be causing pain    Anti-inflammation medicines, which reduce swelling and tenderness, such as ibuprofen (Advil, Motrin) or naproxen (Aleve)    Acetaminophen (Tylenol)    Antidepressants and anti-seizure medicines, especially for nerve pain    Topical treatments such as patches or creams    Injections or nerve blocks    Chiropractic or osteopathic treatment    Acupuncture, massage, deep breathing, meditation, visual imagery, aromatherapy    Use heat or ice at the pain site    Physical therapy     Exercise    Stop smoking    Take part in therapy       Risks and side effects     Talk to your doctor before you start or decide to keep taking opioids. Possible side effects include:      Lowering your breathing rate enough to cause death    Overdose, including death, especially if taking higher than prescribed doses    Worse depression symptoms; less pleasure in things you usually enjoy    Feeling tired or sluggish    Slower thoughts or cloudy thinking    Being more sensitive to pain over time; pain is harder to control    Trouble sleeping or restless sleep    Changes in hormone levels (for example, less testosterone)    Changes in sex drive or ability to have sex    Constipation    Unsafe driving    Itching and sweating    Dizziness    Nausea, throwing up and dry mouth    What else should I know about opioids?    Opioids may lead to dependence, tolerance, or addiction.      Dependence means that if you stop or reduce the medicine too quickly, you will have withdrawal symptoms. These include loose poop (diarrhea), jitters, flu-like symptoms,  nervousness and tremors. Dependence is not the same as addiction.                       Tolerance means needing higher doses over time to get the same effect. This may increase the chance of serious side effects.      Addiction is when people improperly use a substance that harms their body, their mind or their relations with others. Use of opiates can cause a relapse of addiction if you have a history of drug or alcohol abuse.      People who have used opioids for a long time may have a lower quality of life, worse depression, higher levels of pain and more visits to doctors.    You can overdose on opioids. Take these steps to lower your risk of overdose:    1. Recognize the signs:  Signs of overdose include decrease or loss of consciousness (blackout), slowed breathing, trouble waking up and blue lips. If someone is worried about overdose, they should call 911.    2. Talk to your doctor about Narcan (naloxone).   If you are at risk for overdose, you may be given a prescription for Narcan. This medicine very quickly reverses the effects of opioids.   If you overdose, a friend or family member can give you Narcan while waiting for the ambulance. They need to know the signs of overdose and how to give Narcan.     3. Don't use alcohol or street drugs.   Taking them with opioids can cause death.    4. Do not take any of these medicines unless your doctor says it s OK. Taking these with opioids can cause death:    Benzodiazepines, such as lorazepam (Ativan), alprazolam (Xanax) or diazepam (Valium)    Muscle relaxers, such as cyclobenzaprine (Flexeril)    Sleeping pills like zolpidem (Ambien)     Other opioids      How to keep you and other people safe while taking opioids:    1. Never share your opioids with others.  Opioid medicines are regulated by the Drug Enforcement Agency ([employer]ID[employer]). Selling or sharing medications is a criminal act.    2. Be sure to store opioids in a secure place, locked up if possible. Young children  can easily swallow them and overdose.    3. When you are traveling with your medicines, keep them in the original bottles. If you use a pill box, be sure you also carry a copy of your medicine list from your clinic or pharmacy.    4. Safe disposal of opioids    Most pharmacies have places to get rid of medicine, called disposal kiosks. Medicine disposal options are also available in every [facility]. Search your county and  medication disposal  to find more options. You can find more details at:  [email or web address]     I agree that my provider, clinic care team, and pharmacy may work with any city, state or federal law enforcement agency that investigates the misuse, sale, or other diversion of my controlled medicine. I will allow my provider to discuss my care with, or share a copy of, this agreement with any other treating provider, pharmacy or emergency room where I receive care.    I have read this agreement and have asked questions about anything I did not understand.    _______________________________________________________  Patient Signature - Oh Solares _____________________                   Date     _______________________________________________________  Provider Signature - Brendon Kay MD   _____________________                   Date     _______________________________________________________  Witness Signature (required if provider not present while patient signing)   _____________________                   Date

## 2021-10-11 DIAGNOSIS — F51.01 PRIMARY INSOMNIA: ICD-10-CM

## 2021-10-13 RX ORDER — ZOLPIDEM TARTRATE 10 MG/1
TABLET ORAL
Qty: 31 TABLET | Refills: 0 | Status: SHIPPED | OUTPATIENT
Start: 2021-10-13 | End: 2021-11-10

## 2021-10-25 ENCOUNTER — IMMUNIZATION (OUTPATIENT)
Dept: NURSING | Facility: CLINIC | Age: 76
End: 2021-10-25
Payer: COMMERCIAL

## 2021-10-25 ENCOUNTER — ANTICOAGULATION THERAPY VISIT (OUTPATIENT)
Dept: ANTICOAGULATION | Facility: CLINIC | Age: 76
End: 2021-10-25

## 2021-10-25 ENCOUNTER — LAB (OUTPATIENT)
Dept: LAB | Facility: CLINIC | Age: 76
End: 2021-10-25
Payer: COMMERCIAL

## 2021-10-25 DIAGNOSIS — Z23 HIGH PRIORITY FOR 2019-NCOV VACCINE: Primary | ICD-10-CM

## 2021-10-25 DIAGNOSIS — D68.51 FACTOR V LEIDEN MUTATION (H): ICD-10-CM

## 2021-10-25 DIAGNOSIS — D68.9 COAGULATION DEFECT (H): ICD-10-CM

## 2021-10-25 DIAGNOSIS — D68.9 COAGULATION DEFECT (H): Primary | ICD-10-CM

## 2021-10-25 DIAGNOSIS — Z79.01 LONG TERM CURRENT USE OF ANTICOAGULANT THERAPY: ICD-10-CM

## 2021-10-25 LAB — INR BLD: 2.4 (ref 0.9–1.1)

## 2021-10-25 PROCEDURE — 91300 COVID-19,PF,PFIZER (12+ YRS): CPT

## 2021-10-25 PROCEDURE — 0004A COVID-19,PF,PFIZER (12+ YRS): CPT

## 2021-10-25 PROCEDURE — 36415 COLL VENOUS BLD VENIPUNCTURE: CPT

## 2021-10-25 PROCEDURE — 99207 PR NO CHARGE LOS: CPT

## 2021-10-25 PROCEDURE — 85610 PROTHROMBIN TIME: CPT

## 2021-10-25 NOTE — PROGRESS NOTES
Prior to injection, verified patient identity using patient's name and date of birth. Due to injection administration, patient instructed to remain in clinic for 15 minutes afterwards, and to report any adverse reaction to me immediately.  Lsia Adhikari MA

## 2021-10-25 NOTE — PROGRESS NOTES
ANTICOAGULATION MANAGEMENT     Oh Solares 76 year old male is on warfarin with therapeutic INR result. (Goal INR 2.0-3.0)    Recent labs: (last 7 days)     10/25/21  1317   INR 2.4*       ASSESSMENT     Source(s): Chart Review and Patient/Caregiver Call     Warfarin doses taken: Warfarin taken as instructed  Diet: No new diet changes identified  New illness, injury, or hospitalization: No  Medication/supplement changes: None noted  Signs or symptoms of bleeding or clotting: No  Previous INR: Therapeutic last 2(+) visits  Additional findings: None     PLAN     Recommended plan for no diet, medication or health factor changes affecting INR     Dosing Instructions: Continue your current warfarin dose with next INR in 6 weeks       Summary  As of 10/25/2021    Full warfarin instructions:  2.5 mg every Tue; 5 mg all other days   Next INR check:  12/6/2021             Telephone call with Oh who verbalizes understanding and agrees to plan and who agrees to plan and repeated back plan correctly    Lab visit scheduled    Education provided: Please call back if any changes to your diet, medications or how you've been taking warfarin    Plan made per [facility] anticoagulation protocol    Arminda Plolock RN  Anticoagulation Clinic  10/25/2021    _______________________________________________________________________     Anticoagulation Episode Summary     Current INR goal:  2.0-3.0   TTR:  57.8 % (1 y)   Target end date:  Indefinite   Send INR reminders to:  JESSICA MOY    Indications    Coagulation defect (HCC) [D68.9] [D68.9]  Factor V Leiden mutation (H) [D68.51]  Long-term (current) use of anticoagulants [Z79.01] [Z79.01]           Comments:           Anticoagulation Care Providers     Provider Role Specialty Phone number    Brendon Kay MD Referring Internal Medicine - Pediatrics [phone]    Karina Galvez PA-C Referring Family Medicine [phone]

## 2021-11-03 DIAGNOSIS — M54.50 CHRONIC BILATERAL LOW BACK PAIN WITHOUT SCIATICA: ICD-10-CM

## 2021-11-03 DIAGNOSIS — G89.29 CHRONIC BILATERAL LOW BACK PAIN WITHOUT SCIATICA: ICD-10-CM

## 2021-11-03 DIAGNOSIS — G89.4 CHRONIC PAIN SYNDROME: ICD-10-CM

## 2021-11-03 NOTE — TELEPHONE ENCOUNTER
Reason for Call:  Medication or medication refill:    Do you use a [facility]?  Name of the pharmacy and phone number for the current request:  [facility]  - [place] [phone]    Name of the medication requested: HYDROcodone-acetaminophen (NORCO) 5-325 MG tablet    Other request: Patient is currently out of medication and in need of a refill.     Can we leave a detailed message on this number? YES    Phone number patient can be reached at: Home number on file [phone] (home) or Cell number on file:    Telephone Information:   Mobile [phone]       Best Time: N/a    Call taken on 11/3/2021 at 1:09 PM by Sara Andujar

## 2021-11-04 RX ORDER — HYDROCODONE BITARTRATE AND ACETAMINOPHEN 5; 325 MG/1; MG/1
1 TABLET ORAL EVERY 6 HOURS PRN
Qty: 30 TABLET | Refills: 0 | Status: SHIPPED | OUTPATIENT
Start: 2021-11-04 | End: 2022-01-04

## 2021-11-10 DIAGNOSIS — F51.01 PRIMARY INSOMNIA: ICD-10-CM

## 2021-11-10 RX ORDER — ZOLPIDEM TARTRATE 10 MG/1
TABLET ORAL
Qty: 31 TABLET | Refills: 0 | Status: SHIPPED | OUTPATIENT
Start: 2021-11-10 | End: 2021-12-13

## 2021-11-10 NOTE — TELEPHONE ENCOUNTER
Routing refill request to provider for review/approval because:  Drug not on the [facility] refill protocol     Vesta Rogers RN

## 2021-12-04 DIAGNOSIS — F41.9 ANXIETY: ICD-10-CM

## 2021-12-06 ENCOUNTER — LAB (OUTPATIENT)
Dept: LAB | Facility: CLINIC | Age: 76
End: 2021-12-06
Payer: COMMERCIAL

## 2021-12-06 ENCOUNTER — ANTICOAGULATION THERAPY VISIT (OUTPATIENT)
Dept: ANTICOAGULATION | Facility: CLINIC | Age: 76
End: 2021-12-06

## 2021-12-06 DIAGNOSIS — D68.51 FACTOR V LEIDEN MUTATION (H): ICD-10-CM

## 2021-12-06 DIAGNOSIS — D68.9 COAGULATION DEFECT (H): Primary | ICD-10-CM

## 2021-12-06 DIAGNOSIS — D68.9 COAGULATION DEFECT (H): ICD-10-CM

## 2021-12-06 DIAGNOSIS — Z79.01 LONG TERM CURRENT USE OF ANTICOAGULANT THERAPY: ICD-10-CM

## 2021-12-06 LAB — INR BLD: 2.2 (ref 0.9–1.1)

## 2021-12-06 PROCEDURE — 85610 PROTHROMBIN TIME: CPT

## 2021-12-06 PROCEDURE — 36416 COLLJ CAPILLARY BLOOD SPEC: CPT

## 2021-12-06 RX ORDER — ALPRAZOLAM 0.25 MG
TABLET ORAL
Qty: 30 TABLET | Refills: 0 | Status: SHIPPED | OUTPATIENT
Start: 2021-12-06 | End: 2022-02-03

## 2021-12-06 NOTE — PROGRESS NOTES
ANTICOAGULATION MANAGEMENT     Oh Solares 76 year old male is on warfarin with therapeutic INR result. (Goal INR 2.0-3.0)    Recent labs: (last 7 days)     12/06/21  1037   INR 2.2*       ASSESSMENT     Source(s): Chart Review       Warfarin doses taken: Warfarin taken as instructed    Diet: No new diet changes identified    New illness, injury, or hospitalization: No    Medication/supplement changes: None noted    Signs or symptoms of bleeding or clotting: No    Previous INR: Therapeutic last 2(+) visits    Additional findings: None     PLAN     Recommended plan for no diet, medication or health factor changes affecting INR     Dosing Instructions: Continue your current warfarin dose with next INR in 6 weeks       Summary  As of 12/6/2021    Full warfarin instructions:  2.5 mg every Tue; 5 mg all other days   Next INR check:               Telephone call with Oh who verbalizes understanding and agrees to plan    Lab visit scheduled    Education provided: Please call back if any changes to your diet, medications or how you've been taking warfarin    Plan made per [facility] anticoagulation protocol    Ledy Mccall, RN  Anticoagulation Clinic  12/6/2021    _______________________________________________________________________     Anticoagulation Episode Summary     Current INR goal:  2.0-3.0   TTR:  64.6 % (1 y)   Target end date:  Indefinite   Send INR reminders to:  ANTICONIKUNJ MOY    Indications    Coagulation defect (HCC) [D68.9] [D68.9]  Factor V Leiden mutation (H) [D68.51]  Long-term (current) use of anticoagulants [Z79.01] [Z79.01]           Comments:           Anticoagulation Care Providers     Provider Role Specialty Phone number    Brendon Kay MD Referring Internal Medicine - Pediatrics [phone]    Karina Galvez PA-C Referring Family Medicine [phone]

## 2021-12-12 DIAGNOSIS — F51.01 PRIMARY INSOMNIA: ICD-10-CM

## 2021-12-12 NOTE — TELEPHONE ENCOUNTER
Routing refill request to provider for review/approval because:  Drug not on the [facility] refill protocol   zolpidem (AMBIEN) 10 MG tablet 31 tablet 0 11/10/2021  No   Sig: TAKE 1 TABLET BY MOUTH EVERY DAY AT BEDTIME AS NEEDED   LAST OV-10/4/21

## 2021-12-13 RX ORDER — ZOLPIDEM TARTRATE 10 MG/1
TABLET ORAL
Qty: 31 TABLET | Refills: 0 | Status: SHIPPED | OUTPATIENT
Start: 2021-12-13 | End: 2022-01-10

## 2022-01-03 DIAGNOSIS — G89.4 CHRONIC PAIN SYNDROME: ICD-10-CM

## 2022-01-03 DIAGNOSIS — M54.50 CHRONIC BILATERAL LOW BACK PAIN WITHOUT SCIATICA: ICD-10-CM

## 2022-01-03 DIAGNOSIS — G89.29 CHRONIC BILATERAL LOW BACK PAIN WITHOUT SCIATICA: ICD-10-CM

## 2022-01-04 RX ORDER — HYDROCODONE BITARTRATE AND ACETAMINOPHEN 5; 325 MG/1; MG/1
1 TABLET ORAL EVERY 6 HOURS PRN
Qty: 30 TABLET | Refills: 0 | Status: SHIPPED | OUTPATIENT
Start: 2022-01-04 | End: 2022-03-07

## 2022-01-10 DIAGNOSIS — F51.01 PRIMARY INSOMNIA: ICD-10-CM

## 2022-01-10 RX ORDER — ZOLPIDEM TARTRATE 10 MG/1
TABLET ORAL
Qty: 31 TABLET | Refills: 0 | Status: SHIPPED | OUTPATIENT
Start: 2022-01-10 | End: 2022-02-27

## 2022-01-17 ENCOUNTER — ANTICOAGULATION THERAPY VISIT (OUTPATIENT)
Dept: ANTICOAGULATION | Facility: CLINIC | Age: 77
End: 2022-01-17

## 2022-01-17 ENCOUNTER — LAB (OUTPATIENT)
Dept: LAB | Facility: CLINIC | Age: 77
End: 2022-01-17
Payer: COMMERCIAL

## 2022-01-17 DIAGNOSIS — D68.51 FACTOR V LEIDEN MUTATION (H): ICD-10-CM

## 2022-01-17 DIAGNOSIS — D68.9 COAGULATION DEFECT (H): Primary | ICD-10-CM

## 2022-01-17 DIAGNOSIS — D68.9 COAGULATION DEFECT (H): ICD-10-CM

## 2022-01-17 DIAGNOSIS — Z79.01 LONG TERM CURRENT USE OF ANTICOAGULANT THERAPY: ICD-10-CM

## 2022-01-17 LAB — INR BLD: 2.2 (ref 0.9–1.1)

## 2022-01-17 PROCEDURE — 85610 PROTHROMBIN TIME: CPT

## 2022-01-17 PROCEDURE — 36415 COLL VENOUS BLD VENIPUNCTURE: CPT

## 2022-01-17 NOTE — PROGRESS NOTES
ANTICOAGULATION MANAGEMENT     Oh Solares 76 year old male is on warfarin with therapeutic INR result. (Goal INR 2.0-3.0)    Recent labs: (last 7 days)     01/17/22  1048   INR 2.2*       ASSESSMENT     Source(s): Chart Review and Patient/Caregiver Call     Warfarin doses taken: Warfarin taken as instructed  Diet: No new diet changes identified  New illness, injury, or hospitalization: No  Medication/supplement changes: None noted  Signs or symptoms of bleeding or clotting: No  Previous INR: Therapeutic last 2(+) visits  Additional findings: None     PLAN     Recommended plan for no diet, medication or health factor changes affecting INR     Dosing Instructions: Continue your current warfarin dose with next INR in 6 weeks       Summary  As of 1/17/2022    Full warfarin instructions:  2.5 mg every Tue; 5 mg all other days   Next INR check:  2/28/2022             Telephone call with Oh who verbalizes understanding and agrees to plan    Lab visit scheduled    Education provided: None required    Plan made per [facility] anticoagulation protocol    Anitra Rogers RN  Anticoagulation Clinic  1/17/2022    _______________________________________________________________________     Anticoagulation Episode Summary     Current INR goal:  2.0-3.0   TTR:  74.7 % (1 y)   Target end date:  Indefinite   Send INR reminders to:  [facility]    Indications    Coagulation defect (HCC) [D68.9] [D68.9]  Factor V Leiden mutation (H) [D68.51]  Long-term (current) use of anticoagulants [Z79.01] [Z79.01]           Comments:           Anticoagulation Care Providers     Provider Role Specialty Phone number    Brendon Kay MD Referring Internal Medicine - Pediatrics [phone]    Karina Galvez PA-C Referring Family Medicine [phone]

## 2022-02-02 DIAGNOSIS — F41.9 ANXIETY: ICD-10-CM

## 2022-02-02 NOTE — TELEPHONE ENCOUNTER
Routing refill request to provider for review/approval because:  Drug not on the [facility] refill protocol.     Milly Boykin RN   [facility]

## 2022-02-03 RX ORDER — ALPRAZOLAM 0.25 MG
TABLET ORAL
Qty: 30 TABLET | Refills: 0 | Status: SHIPPED | OUTPATIENT
Start: 2022-02-03 | End: 2022-04-04

## 2022-02-24 DIAGNOSIS — F51.01 PRIMARY INSOMNIA: ICD-10-CM

## 2022-02-27 RX ORDER — ZOLPIDEM TARTRATE 10 MG/1
TABLET ORAL
Qty: 31 TABLET | Refills: 0 | Status: SHIPPED | OUTPATIENT
Start: 2022-02-27 | End: 2022-03-25

## 2022-02-28 ENCOUNTER — ANTICOAGULATION THERAPY VISIT (OUTPATIENT)
Dept: ANTICOAGULATION | Facility: CLINIC | Age: 77
End: 2022-02-28

## 2022-02-28 ENCOUNTER — LAB (OUTPATIENT)
Dept: LAB | Facility: CLINIC | Age: 77
End: 2022-02-28
Payer: COMMERCIAL

## 2022-02-28 DIAGNOSIS — D68.51 FACTOR V LEIDEN MUTATION (H): ICD-10-CM

## 2022-02-28 DIAGNOSIS — Z79.01 LONG TERM CURRENT USE OF ANTICOAGULANT THERAPY: ICD-10-CM

## 2022-02-28 DIAGNOSIS — D68.9 COAGULATION DEFECT (H): Primary | ICD-10-CM

## 2022-02-28 DIAGNOSIS — D68.9 COAGULATION DEFECT (H): ICD-10-CM

## 2022-02-28 LAB — INR BLD: 3.1 (ref 0.9–1.1)

## 2022-02-28 PROCEDURE — 36415 COLL VENOUS BLD VENIPUNCTURE: CPT

## 2022-02-28 PROCEDURE — 85610 PROTHROMBIN TIME: CPT

## 2022-02-28 NOTE — PROGRESS NOTES
ANTICOAGULATION MANAGEMENT     Oh Solares 77 year old male is on warfarin with therapeutic INR result. (Goal INR 2.0-3.0)    Recent labs: (last 7 days)     02/28/22  0950   INR 3.1*       ASSESSMENT       Source(s): Chart Review and Patient/Caregiver Call       Warfarin doses taken: Warfarin taken as instructed    Diet: No new diet changes identified    New illness, injury, or hospitalization: No    Medication/supplement changes: None noted    Signs or symptoms of bleeding or clotting: No    Previous INR: Therapeutic last 2(+) visits    Additional findings: None       PLAN     Recommended plan for no diet, medication or health factor changes affecting INR     Dosing Instructions: Continue your current warfarin dose with next INR in 2 weeks       Summary  As of 2/28/2022    Full warfarin instructions:  2.5 mg every Tue; 5 mg all other days   Next INR check:  3/14/2022             Telephone call with Oh who verbalizes understanding and agrees to plan    Lab visit scheduled    Education provided: Goal range and significance of current result, Monitoring for bleeding signs and symptoms and Monitoring for clotting signs and symptoms    Plan made per [facility] anticoagulation protocol    Tawny Morales RN  Anticoagulation Clinic  2/28/2022    _______________________________________________________________________     Anticoagulation Episode Summary     Current INR goal:  2.0-3.0   TTR:  84.9 % (1 y)   Target end date:  Indefinite   Send INR reminders to:  JESSICA MOY    Indications    Coagulation defect (HCC) [D68.9] [D68.9]  Factor V Leiden mutation (H) [D68.51]  Long-term (current) use of anticoagulants [Z79.01] [Z79.01]           Comments:           Anticoagulation Care Providers     Provider Role Specialty Phone number    Brendon Kay MD Referring Internal Medicine - Pediatrics [phone]    Karina Galvez PA-C Referring Family Medicine [phone]

## 2022-03-05 DIAGNOSIS — G89.29 CHRONIC BILATERAL LOW BACK PAIN WITHOUT SCIATICA: ICD-10-CM

## 2022-03-05 DIAGNOSIS — G89.4 CHRONIC PAIN SYNDROME: ICD-10-CM

## 2022-03-05 DIAGNOSIS — M54.50 CHRONIC BILATERAL LOW BACK PAIN WITHOUT SCIATICA: ICD-10-CM

## 2022-03-05 NOTE — TELEPHONE ENCOUNTER
Reason for Call:  Other prescription    Detailed comments: HYDROCODONE    Phone Number Patient can be reached at: Other phone number:  [phone]    Best Time: ANYTIME    Can we leave a detailed message on this number? YES    Call taken on 3/5/2022 at 10:24 AM by Cheri Edouard

## 2022-03-07 RX ORDER — HYDROCODONE BITARTRATE AND ACETAMINOPHEN 5; 325 MG/1; MG/1
1 TABLET ORAL EVERY 6 HOURS PRN
Qty: 30 TABLET | Refills: 0 | Status: SHIPPED | OUTPATIENT
Start: 2022-03-07 | End: 2022-05-05

## 2022-03-09 NOTE — TELEPHONE ENCOUNTER
Last Rx Noco was 30 tabs 5/2/17.    Last office visit:  8/2/16.   Annual visit advised at that time.  However, plan under chronic pain in problem list advises every 6 mos follow up.    Routing refill request to provider for review/approval because:  Drug not on the [facility] refill protocol     Lina Byrnes RN  [facility]                
Ok refill    
Prescription of HYDROcodone-acetaminophen (NORCO) 5-325 MG was brought to the  and patient informed to .  
Reason for Call:  Medication or medication refill:    Do you use a [facility]?  Name of the pharmacy and phone number for the current request:  Patient picks up at     Name of the medication requested: HYDROcodone-acetaminophen (NORCO) 5-325 MG per tablet      Can we leave a detailed message on this number? YES    Phone number patient can be reached at: Cell number on file:    Telephone Information:   Mobile [phone]       Best Time: Anytime  Call taken on 7/17/2017 at 4:06 PM by Vesta Bassett      
no

## 2022-03-15 ENCOUNTER — ANTICOAGULATION THERAPY VISIT (OUTPATIENT)
Dept: ANTICOAGULATION | Facility: CLINIC | Age: 77
End: 2022-03-15

## 2022-03-15 ENCOUNTER — LAB (OUTPATIENT)
Dept: LAB | Facility: CLINIC | Age: 77
End: 2022-03-15
Payer: COMMERCIAL

## 2022-03-15 DIAGNOSIS — D68.9 COAGULATION DEFECT (H): Primary | ICD-10-CM

## 2022-03-15 DIAGNOSIS — D68.9 COAGULATION DEFECT (H): ICD-10-CM

## 2022-03-15 DIAGNOSIS — D68.51 FACTOR V LEIDEN MUTATION (H): ICD-10-CM

## 2022-03-15 DIAGNOSIS — Z79.01 LONG TERM CURRENT USE OF ANTICOAGULANT THERAPY: ICD-10-CM

## 2022-03-15 LAB — INR BLD: 2.5 (ref 0.9–1.1)

## 2022-03-15 PROCEDURE — 36415 COLL VENOUS BLD VENIPUNCTURE: CPT

## 2022-03-15 PROCEDURE — 85610 PROTHROMBIN TIME: CPT

## 2022-03-15 NOTE — PROGRESS NOTES
ANTICOAGULATION MANAGEMENT     Oh oSlares 77 year old male is on warfarin with therapeutic INR result. (Goal INR 2.0-3.0)    Recent labs: (last 7 days)     03/15/22  0936   INR 2.5*       ASSESSMENT       Source(s): Chart Review and Patient/Caregiver Call       Warfarin doses taken: Warfarin taken as instructed    Diet: No new diet changes identified    New illness, injury, or hospitalization: No    Medication/supplement changes: None noted    Signs or symptoms of bleeding or clotting: No    Previous INR: Therapeutic last 2(+) visits    Additional findings: None       PLAN     Recommended plan for no diet, medication or health factor changes affecting INR     Dosing Instructions: Continue your current warfarin dose with next INR in 5 weeks       Summary  As of 3/15/2022    Full warfarin instructions:  2.5 mg every Tue; 5 mg all other days   Next INR check:  4/19/2022             Telephone call with Oh who verbalizes understanding and agrees to plan    Lab visit scheduled    Education provided: Goal range and significance of current result    Plan made per [facility] anticoagulation protocol    Danay Pradhan RN  Anticoagulation Clinic  3/15/2022    _______________________________________________________________________     Anticoagulation Episode Summary     Current INR goal:  2.0-3.0   TTR:  86.6 % (1 y)   Target end date:  Indefinite   Send INR reminders to:  [facility]    Indications    Coagulation defect (HCC) [D68.9] [D68.9]  Factor V Leiden mutation (H) [D68.51]  Long-term (current) use of anticoagulants [Z79.01] [Z79.01]           Comments:           Anticoagulation Care Providers     Provider Role Specialty Phone number    Brendon Kay MD Referring Internal Medicine - Pediatrics [phone]    Karina Galvez PA-C Referring Family Medicine [phone]

## 2022-03-23 DIAGNOSIS — F51.01 PRIMARY INSOMNIA: ICD-10-CM

## 2022-03-23 DIAGNOSIS — Z79.01 LONG TERM CURRENT USE OF ANTICOAGULANT THERAPY: ICD-10-CM

## 2022-03-25 RX ORDER — WARFARIN SODIUM 5 MG/1
TABLET ORAL
Qty: 100 TABLET | Refills: 0 | Status: SHIPPED | OUTPATIENT
Start: 2022-03-25 | End: 2022-05-02

## 2022-03-25 RX ORDER — ZOLPIDEM TARTRATE 10 MG/1
TABLET ORAL
Qty: 31 TABLET | Refills: 0 | Status: SHIPPED | OUTPATIENT
Start: 2022-03-25 | End: 2022-04-25

## 2022-03-25 NOTE — TELEPHONE ENCOUNTER
"Routing refill request to provider for review/approval because:  zolpidem (AMBIEN)  not on the [facility] refill protocol       Requested Prescriptions   Pending Prescriptions Disp Refills     zolpidem (AMBIEN) 10 MG tablet [Pharmacy Med Name: ZOLPIDEM TARTRATE 10 MG TABLET] 31 tablet 0     Sig: TAKE 1 TABLET BY MOUTH EVERY DAY AT BEDTIME AS NEEDED       There is no refill protocol information for this order      Signed Prescriptions Disp Refills    warfarin ANTICOAGULANT (COUMADIN) 5 MG tablet 100 tablet 0     Sig: Take half a tab (2.5mg) every Tue AND 1 TAB (5 MG) ALL OTHER DAYS OR AS DIRECTED BY INR CLINIC       Vitamin K Antagonists Failed - 3/23/2022  2:58 PM        Failed - INR is within goal in the past 6 weeks     Confirm INR is within goal in the past 6 weeks.     Recent Labs   Lab Test 03/15/22  0936   INR 2.5*                       Passed - Recent (12 mo) or future (30 days) visit within the authorizing provider's specialty     Patient has had an office visit with the authorizing provider or a provider within the authorizing providers department within the previous 12 mos or has a future within next 30 days. See \"Patient Info\" tab in inbasket, or \"Choose Columns\" in Meds & Orders section of the refill encounter.              Passed - Medication is active on med list        Passed - Patient is 18 years of age or older           Yun Almazan RN    "

## 2022-04-04 ENCOUNTER — TELEPHONE (OUTPATIENT)
Dept: FAMILY MEDICINE | Facility: CLINIC | Age: 77
End: 2022-04-04
Payer: COMMERCIAL

## 2022-04-04 DIAGNOSIS — F41.9 ANXIETY: ICD-10-CM

## 2022-04-04 DIAGNOSIS — L98.9 SKIN LESION: Primary | ICD-10-CM

## 2022-04-04 RX ORDER — ALPRAZOLAM 0.25 MG
TABLET ORAL
Qty: 30 TABLET | Refills: 0 | Status: SHIPPED | OUTPATIENT
Start: 2022-04-04 | End: 2022-06-07

## 2022-04-04 NOTE — TELEPHONE ENCOUNTER
Reason for call:  Symptom   Symptom or request:   Left side of his body, trunk of his body on area of his ribs  Patient thinks his has skin cancer and wants a referral to Derm.    Duration (how long have symptoms been present): Few weeks / month  Have you been treated for this before? No    Additional comments: He is scheduled to see the provider.    Next 5 appointments (look out 90 days)    Apr 08, 2022 12:00 PM  (Arrive by 11:40 AM)  Provider Visit with Brendon Kay MD  [facility] ([facility] ) [address] [place] [id number] [phone]          Phone number to reach patient:  Cell number on file:    Telephone Information:   Mobile [phone]       Best Time:      Can we leave a detailed message on this number?  YES    Travel screening: Not Applicable

## 2022-04-04 NOTE — TELEPHONE ENCOUNTER
Routing refill request to provider for review/approval because:  Drug not on the [facility] refill protocol     Requested Prescriptions   Pending Prescriptions Disp Refills    ALPRAZolam (XANAX) 0.25 MG tablet [Pharmacy Med Name: ALPRAZOLAM 0.25 MG TABLET] 30 tablet 0     Sig: TAKE 1 TABLET BY MOUTH THREE TIMES A DAY AS NEEDED FOR ANXIETY        There is no refill protocol information for this order           Lesley Wilde RN  [facility]

## 2022-04-18 ENCOUNTER — ANTICOAGULATION THERAPY VISIT (OUTPATIENT)
Dept: ANTICOAGULATION | Facility: CLINIC | Age: 77
End: 2022-04-18

## 2022-04-18 ENCOUNTER — LAB (OUTPATIENT)
Dept: LAB | Facility: CLINIC | Age: 77
End: 2022-04-18
Payer: COMMERCIAL

## 2022-04-18 ENCOUNTER — OFFICE VISIT (OUTPATIENT)
Dept: FAMILY MEDICINE | Facility: CLINIC | Age: 77
End: 2022-04-18

## 2022-04-18 VITALS
WEIGHT: 152 LBS | OXYGEN SATURATION: 96 % | DIASTOLIC BLOOD PRESSURE: 73 MMHG | SYSTOLIC BLOOD PRESSURE: 126 MMHG | HEIGHT: 65 IN | BODY MASS INDEX: 25.33 KG/M2 | HEART RATE: 86 BPM

## 2022-04-18 DIAGNOSIS — Z79.01 LONG TERM CURRENT USE OF ANTICOAGULANT THERAPY: ICD-10-CM

## 2022-04-18 DIAGNOSIS — L98.9 SKIN LESION: Primary | ICD-10-CM

## 2022-04-18 DIAGNOSIS — D68.9 COAGULATION DEFECT (H): ICD-10-CM

## 2022-04-18 DIAGNOSIS — D68.51 FACTOR V LEIDEN MUTATION (H): ICD-10-CM

## 2022-04-18 DIAGNOSIS — D68.9 COAGULATION DEFECT (H): Primary | ICD-10-CM

## 2022-04-18 LAB — INR BLD: 2.8 (ref 0.9–1.1)

## 2022-04-18 PROCEDURE — 36415 COLL VENOUS BLD VENIPUNCTURE: CPT

## 2022-04-18 PROCEDURE — 85610 PROTHROMBIN TIME: CPT

## 2022-04-18 PROCEDURE — 99213 OFFICE O/P EST LOW 20 MIN: CPT | Performed by: INTERNAL MEDICINE

## 2022-04-18 NOTE — PROGRESS NOTES
"  Assessment & Plan   Problem List Items Addressed This Visit    None     Visit Diagnoses     Skin lesion    -  Primary         Left chest wall, large actinic keratosis  Frozen   . All lesions are frozen with LN2 x3. Patient tolerated procedure well.     ASSESSMENT:  Actinic keratosis    PLAN:  WART CARE DISCUSSED. USE OF OTC PRODUCT STARTING IN FEW DAYS. GENTLE ABRAISION WITH PUMICE STONE OR EMERY BOARD WITH GOOD HANDWASHING AFTER. RETURN IN TWO WEEKS FOR REFREEZING UNTIL RESOLVED.           BMI:   Estimated body mass index is 25.37 kg/m  as calculated from the following:    Height as of this encounter: 1.648 m (5' 4.9\").    Weight as of this encounter: 68.9 kg (152 lb).   Weight management plan: Discussed healthy diet and exercise guidelines    Work on weight loss  Regular exercise    Return in about 6 months (around 10/18/2022) for follow up of condition, medication management.    Brendon Kay MD  [facility] CHINMAY Casiano is a 77 year old who presents for the following health issues     History of Present Illness       Reason for visit:  Check skin for cancer  Symptom onset:  3-4 weeks ago  Symptom intensity:  Mild  Symptom progression:  Staying the same  Had these symptoms before:  No    He eats 0-1 servings of fruits and vegetables daily.He consumes 4 sweetened beverage(s) daily.He exercises with enough effort to increase his heart rate 20 to 29 minutes per day.  He exercises with enough effort to increase his heart rate 7 days per week.   He is taking medications regularly.         Review of Systems   Constitutional, HEENT, cardiovascular, pulmonary, gi and gu systems are negative, except as otherwise noted.      Objective    /73 (BP Location: Right arm, Patient Position: Chair, Cuff Size: Adult Regular)   Pulse 86   Ht 1.648 m (5' 4.9\")   Wt 68.9 kg (152 lb)   SpO2 96%   BMI 25.37 kg/m    Body mass index is 25.37 kg/m .  Physical Exam   GENERAL: healthy, alert and " no distress  EYES: Eyes grossly normal to inspection, PERRL and conjunctivae and sclerae normal  HENT: ear canals and TM's normal, nose and mouth without ulcers or lesions  NECK: no adenopathy, no asymmetry, masses, or scars and thyroid normal to palpation  RESP: lungs clear to auscultation - no rales, rhonchi or wheezes  CV: regular rate and rhythm, normal S1 S2, no S3 or S4, no murmur, click or rub, no peripheral edema and peripheral pulses strong  ABDOMEN: soft, nontender, no hepatosplenomegaly, no masses and bowel sounds normal  MS: no gross musculoskeletal defects noted, no edema  AK large left chest wall  Frozen 3 times with liquid nitrogen    Results for orders placed or performed in visit on 04/18/22   INR point of care     Status: Abnormal   Result Value Ref Range    INR 2.8 (H) 0.9 - 1.1    Narrative    This test is intended for monitoring Coumadin therapy. Results are not accurate in patients with prolonged INR due to factor deficiency.

## 2022-04-18 NOTE — PROGRESS NOTES
ANTICOAGULATION MANAGEMENT     Oh Solares 77 year old male is on warfarin with therapeutic INR result. (Goal INR 2.0-3.0)    Recent labs: (last 7 days)     04/18/22  1313   INR 2.8*       ASSESSMENT       Source(s): Chart Review and Patient/Caregiver Call       Warfarin doses taken: Warfarin taken as instructed    Diet: No new diet changes identified    New illness, injury, or hospitalization: No    Medication/supplement changes: None noted    Signs or symptoms of bleeding or clotting: No    Previous INR: Therapeutic last 2(+) visits    Additional findings: None       PLAN     Recommended plan for no diet, medication or health factor changes affecting INR     Dosing Instructions: continue your current warfarin dose with next INR in 6 weeks       Summary  As of 4/18/2022    Full warfarin instructions:  2.5 mg every Tue; 5 mg all other days   Next INR check:  5/31/2022             Telephone call with Oh who verbalizes understanding and agrees to plan    Lab visit scheduled    Education provided: Please call back if any changes to your diet, medications or how you've been taking warfarin    Plan made per [facility] anticoagulation protocol    Leyd Mccall RN  Anticoagulation Clinic  4/18/2022    _______________________________________________________________________     Anticoagulation Episode Summary     Current INR goal:  2.0-3.0   TTR:  86.6 % (1 y)   Target end date:  Indefinite   Send INR reminders to:  JESSICA MOY    Indications    Coagulation defect (HCC) [D68.9] [D68.9]  Factor V Leiden mutation (H) [D68.51]  Long-term (current) use of anticoagulants [Z79.01] [Z79.01]           Comments:           Anticoagulation Care Providers     Provider Role Specialty Phone number    Brendon Kay MD Referring Internal Medicine - Pediatrics [phone]    Karina Galvez PA-C Referring Family Medicine [phone]

## 2022-04-25 DIAGNOSIS — F51.01 PRIMARY INSOMNIA: ICD-10-CM

## 2022-04-25 RX ORDER — ZOLPIDEM TARTRATE 10 MG/1
TABLET ORAL
Qty: 31 TABLET | Refills: 0 | Status: SHIPPED | OUTPATIENT
Start: 2022-04-25 | End: 2022-05-27

## 2022-04-25 NOTE — TELEPHONE ENCOUNTER
Routing refill request to provider for review/approval because:  Drug not on the [facility] refill protocol     Requested Prescriptions   Pending Prescriptions Disp Refills    zolpidem (AMBIEN) 10 MG tablet [Pharmacy Med Name: ZOLPIDEM TARTRATE 10 MG TABLET] 31 tablet 0     Sig: TAKE 1 TABLET BY MOUTH EVERY DAY AT BEDTIME AS NEEDED        There is no refill protocol information for this order            Milly Boykin RN   [facility]

## 2022-04-30 DIAGNOSIS — Z79.01 LONG TERM CURRENT USE OF ANTICOAGULANT THERAPY: ICD-10-CM

## 2022-05-02 RX ORDER — WARFARIN SODIUM 5 MG/1
TABLET ORAL
Qty: 100 TABLET | Refills: 1 | Status: SHIPPED | OUTPATIENT
Start: 2022-05-02 | End: 2022-10-13

## 2022-05-02 NOTE — TELEPHONE ENCOUNTER
ANTICOAGULATION MANAGEMENT:  Medication Refill    Anticoagulation Summary  As of 4/18/2022    Warfarin maintenance plan:  2.5 mg (5 mg x 0.5) every Tue; 5 mg (5 mg x 1) all other days   Next INR check:  5/31/2022   Target end date:  Indefinite    Indications    Coagulation defect (HCC) [D68.9] [D68.9]  Factor V Leiden mutation (H) [D68.51]  Long-term (current) use of anticoagulants [Z79.01] [Z79.01]             Anticoagulation Care Providers     Provider Role Specialty Phone number    Brendon Kay MD Referring Internal Medicine - Pediatrics [phone]    Karina Galvez PA-C Referring Family Medicine [phone]          Visit with referring provider/group within last year: Yes    [facility] referral signed within last year: Yes    Oh meets all criteria for refill (current [facility] referral, office visit with referring provider/group in last year, lab monitoring up to date or not exceeding 2 weeks overdue). Rx instructions and quantity match patient's current dosing plan. Warfarin 90 day supply with 1 refill granted per [facility] protocol     Tawny Morales, RN  Anticoagulation Clinic

## 2022-05-05 ENCOUNTER — NURSE TRIAGE (OUTPATIENT)
Dept: NURSING | Facility: CLINIC | Age: 77
End: 2022-05-05
Payer: COMMERCIAL

## 2022-05-05 DIAGNOSIS — G89.4 CHRONIC PAIN SYNDROME: ICD-10-CM

## 2022-05-05 DIAGNOSIS — G89.29 CHRONIC BILATERAL LOW BACK PAIN WITHOUT SCIATICA: ICD-10-CM

## 2022-05-05 DIAGNOSIS — M54.50 CHRONIC BILATERAL LOW BACK PAIN WITHOUT SCIATICA: ICD-10-CM

## 2022-05-05 RX ORDER — HYDROCODONE BITARTRATE AND ACETAMINOPHEN 5; 325 MG/1; MG/1
1 TABLET ORAL EVERY 6 HOURS PRN
Qty: 30 TABLET | Refills: 0 | Status: SHIPPED | OUTPATIENT
Start: 2022-05-05 | End: 2022-07-06

## 2022-05-05 NOTE — TELEPHONE ENCOUNTER
"RX refill request: Hydrocodone.   Pharmacy: [facility] [employer], [address], [place]  Request forwarded to provider. See other encounter.    Sierra Betancur RN Triage Nurse Advisor 3:15 PM 5/5/2022  Reason for Disposition    Caller requesting a CONTROLLED substance prescription refill (e.g., narcotics, ADHD medicines)    Additional Information    Negative: Drug overdose and triager unable to answer question    Negative: Caller requesting information unrelated to medicine    Negative: Caller requesting a prescription for Strep throat and has a positive culture result    Negative: Rash while taking a medication or within 3 days of stopping it    Negative: Immunization reaction suspected    Negative: [1] Asthma and [2] having symptoms of asthma (cough, wheezing, etc.)    Negative: [1] Influenza symptoms AND [2] anti-viral med prescription request, such as Tamiflu    Negative: [1] Symptom of illness (e.g., headache, abdominal pain, earache, vomiting) AND [2] more than mild    Negative: [1] DOUBLE DOSE (an extra dose or lesser amount) of prescription drug AND [2] NO symptoms (Exception: a double dose of antibiotics)    Negative: Diabetes drug error or overdose (e.g., took wrong type of insulin or took extra dose)    Negative: [1] Request for URGENT new prescription or refill of \"essential\" medication (i.e., likelihood of harm to patient if not taken) AND [2] triager unable to fill per unit policy    Negative: [1] Prescription not at pharmacy AND [2] was prescribed by PCP recently    Negative: [1] Pharmacy calling with prescription questions AND [2] triager unable to answer question    Negative: [1] Caller has URGENT medication question about med that PCP or specialist prescribed AND [2] triager unable to answer question    Negative: [1] Caller has NON-URGENT medication question about med that PCP prescribed AND [2] triager unable to answer question    Negative: [1] Caller requesting a NON-URGENT new prescription or refill " AND [2] triager unable to refill per unit policy    Negative: [1] Caller has medication question about med not prescribed by PCP AND [2] triager unable to answer question (e.g., compatibility with other med, storage)    Protocols used: MEDICATION QUESTION CALL-A-AH

## 2022-05-05 NOTE — TELEPHONE ENCOUNTER
Routing refill request to provider for review/approval because:  Drug not on the [facility] refill protocol     Last Written Prescription Date: 3/7/2022  Last Fill Quantity: 30,  # refills: 0   Last office visit provider: 4/18/2022 with PCP Dr VIOLETA Kay     Requested Prescriptions   Pending Prescriptions Disp Refills     HYDROcodone-acetaminophen (NORCO) 5-325 MG tablet 30 tablet 0     Sig: Take 1 tablet by mouth every 6 hours as needed for pain       There is no refill protocol information for this order          Abbey Betancur RN 05/05/22 3:11 PM

## 2022-05-26 DIAGNOSIS — F51.01 PRIMARY INSOMNIA: ICD-10-CM

## 2022-05-27 RX ORDER — ZOLPIDEM TARTRATE 10 MG/1
TABLET ORAL
Qty: 31 TABLET | Refills: 0 | Status: SHIPPED | OUTPATIENT
Start: 2022-05-27 | End: 2022-06-28

## 2022-05-31 ENCOUNTER — LAB (OUTPATIENT)
Dept: LAB | Facility: CLINIC | Age: 77
End: 2022-05-31
Payer: COMMERCIAL

## 2022-05-31 ENCOUNTER — ANTICOAGULATION THERAPY VISIT (OUTPATIENT)
Dept: ANTICOAGULATION | Facility: CLINIC | Age: 77
End: 2022-05-31

## 2022-05-31 DIAGNOSIS — D68.9 COAGULATION DEFECT (H): ICD-10-CM

## 2022-05-31 DIAGNOSIS — D68.9 COAGULATION DEFECT (H): Primary | ICD-10-CM

## 2022-05-31 DIAGNOSIS — D68.51 FACTOR V LEIDEN MUTATION (H): ICD-10-CM

## 2022-05-31 DIAGNOSIS — Z79.01 LONG TERM CURRENT USE OF ANTICOAGULANT THERAPY: ICD-10-CM

## 2022-05-31 LAB — INR BLD: 3.5 (ref 0.9–1.1)

## 2022-05-31 PROCEDURE — 85610 PROTHROMBIN TIME: CPT

## 2022-05-31 PROCEDURE — 36416 COLLJ CAPILLARY BLOOD SPEC: CPT

## 2022-05-31 NOTE — PROGRESS NOTES
ANTICOAGULATION MANAGEMENT     Oh Solares 77 year old male is on warfarin with supratherapeutic INR result. (Goal INR 2.0-3.0)    Recent labs: (last 7 days)     05/31/22  0721   INR 3.5*       ASSESSMENT       Source(s): Chart Review and Patient/Caregiver Call       Warfarin doses taken: Warfarin taken as instructed    Diet: Change in alcohol intake may be affecting INR. Plans to resume normal intake - less alcohol     New illness, injury, or hospitalization: No    Medication/supplement changes: None noted    Signs or symptoms of bleeding or clotting: No    Previous INR: Therapeutic last 2(+) visits    Additional findings: None     PLAN     Recommended plan for temporary change(s) affecting INR     Dosing Instructions: hold dose then continue your current warfarin dose with next INR in 2 weeks       Summary  As of 5/31/2022    Full warfarin instructions:  5/31: Hold; Otherwise 2.5 mg every Tue; 5 mg all other days   Next INR check:  6/14/2022             Telephone call with Oh who verbalizes understanding and agrees to plan    Lab visit scheduled    Education provided: Please call back if any changes to your diet, medications or how you've been taking warfarin and Monitoring for bleeding signs and symptoms    Plan made per [facility] anticoagulation protocol    Jing Fermin RN  Anticoagulation Clinic  5/31/2022    _______________________________________________________________________     Anticoagulation Episode Summary     Current INR goal:  2.0-3.0   TTR:  81.5 % (1 y)   Target end date:  Indefinite   Send INR reminders to:  JESSICA MOY    Indications    Coagulation defect (HCC) [D68.9] [D68.9]  Factor V Leiden mutation (H) [D68.51]  Long-term (current) use of anticoagulants [Z79.01] [Z79.01]           Comments:           Anticoagulation Care Providers     Provider Role Specialty Phone number    Brendon Kay MD Referring Internal Medicine - Pediatrics [phone]    Karina Galvez PA-C  [facility] [phone]

## 2022-06-07 DIAGNOSIS — F41.9 ANXIETY: ICD-10-CM

## 2022-06-07 RX ORDER — ALPRAZOLAM 0.25 MG
TABLET ORAL
Qty: 30 TABLET | Refills: 0 | Status: SHIPPED | OUTPATIENT
Start: 2022-06-07 | End: 2022-08-04

## 2022-06-07 NOTE — TELEPHONE ENCOUNTER
Routing refill request to provider for review/approval because:  Drug not on the [facility] refill protocol     Requested Prescriptions   Pending Prescriptions Disp Refills    ALPRAZolam (XANAX) 0.25 MG tablet [Pharmacy Med Name: ALPRAZOLAM 0.25 MG TABLET] 30 tablet 0     Sig: TAKE 1 TABLET BY MOUTH THREE TIMES A DAY AS NEEDED FOR ANXIETY        There is no refill protocol information for this order           Lesley Wilde RN  [facility]     PAIN SCALE 2 OF 10.

## 2022-06-13 ENCOUNTER — ANTICOAGULATION THERAPY VISIT (OUTPATIENT)
Dept: ANTICOAGULATION | Facility: CLINIC | Age: 77
End: 2022-06-13

## 2022-06-13 ENCOUNTER — LAB (OUTPATIENT)
Dept: LAB | Facility: CLINIC | Age: 77
End: 2022-06-13
Payer: COMMERCIAL

## 2022-06-13 DIAGNOSIS — D68.9 COAGULATION DEFECT (H): Primary | ICD-10-CM

## 2022-06-13 DIAGNOSIS — Z79.01 LONG TERM CURRENT USE OF ANTICOAGULANT THERAPY: ICD-10-CM

## 2022-06-13 DIAGNOSIS — D68.51 FACTOR V LEIDEN MUTATION (H): ICD-10-CM

## 2022-06-13 DIAGNOSIS — D68.9 COAGULATION DEFECT (H): ICD-10-CM

## 2022-06-13 LAB — INR BLD: 1.4 (ref 0.9–1.1)

## 2022-06-13 PROCEDURE — 85610 PROTHROMBIN TIME: CPT

## 2022-06-13 PROCEDURE — 36416 COLLJ CAPILLARY BLOOD SPEC: CPT

## 2022-06-13 NOTE — PROGRESS NOTES
ANTICOAGULATION MANAGEMENT     Oh Solares 77 year old male is on warfarin with subtherapeutic INR result. (Goal INR 2.0-3.0)    Recent labs: (last 7 days)     06/13/22  1123   INR 1.4*       ASSESSMENT       Source(s): Chart Review and Patient/Caregiver Call       Warfarin doses taken: Missed dose(s) may be affecting INR    Diet: Change in alcohol intake may be affecting INR. less alcohol     New illness, injury, or hospitalization: No    Medication/supplement changes: None noted    Signs or symptoms of bleeding or clotting: No    Previous INR: Supratherapeutic    Additional findings: None       PLAN     Recommended plan for temporary change(s) affecting INR     Dosing Instructions: booster dose then continue your current warfarin dose with next INR in 1 week       Summary  As of 6/13/2022    Full warfarin instructions:  6/13: 7.5 mg; Otherwise 2.5 mg every Mon; 5 mg all other days   Next INR check:  6/20/2022             Telephone call with Oh who verbalizes understanding and agrees to plan    Lab visit scheduled    Education provided: Please call back if any changes to your diet, medications or how you've been taking warfarin    Plan made per [facility] anticoagulation protocol    Ledy Mccall, RN  Anticoagulation Clinic  6/13/2022    _______________________________________________________________________     Anticoagulation Episode Summary     Current INR goal:  2.0-3.0   TTR:  83.2 % (1 y)   Target end date:  Indefinite   Send INR reminders to:  JESSICA MOY    Indications    Coagulation defect (HCC) [D68.9] [D68.9]  Factor V Leiden mutation (H) [D68.51]  Long-term (current) use of anticoagulants [Z79.01] [Z79.01]           Comments:           Anticoagulation Care Providers     Provider Role Specialty Phone number    Brendon Kay MD Referring Internal Medicine - Pediatrics [phone]    Karina Galvez PA-C Referring Family Medicine [phone]

## 2022-06-20 ENCOUNTER — LAB (OUTPATIENT)
Dept: LAB | Facility: CLINIC | Age: 77
End: 2022-06-20
Payer: COMMERCIAL

## 2022-06-20 ENCOUNTER — ANTICOAGULATION THERAPY VISIT (OUTPATIENT)
Dept: ANTICOAGULATION | Facility: CLINIC | Age: 77
End: 2022-06-20

## 2022-06-20 DIAGNOSIS — D68.51 FACTOR V LEIDEN MUTATION (H): ICD-10-CM

## 2022-06-20 DIAGNOSIS — D68.9 COAGULATION DEFECT (H): Primary | ICD-10-CM

## 2022-06-20 DIAGNOSIS — Z79.01 LONG TERM CURRENT USE OF ANTICOAGULANT THERAPY: ICD-10-CM

## 2022-06-20 DIAGNOSIS — D68.9 COAGULATION DEFECT (H): ICD-10-CM

## 2022-06-20 LAB — INR BLD: 2.7 (ref 0.9–1.1)

## 2022-06-20 PROCEDURE — 36416 COLLJ CAPILLARY BLOOD SPEC: CPT

## 2022-06-20 PROCEDURE — 85610 PROTHROMBIN TIME: CPT

## 2022-06-20 NOTE — PROGRESS NOTES
ANTICOAGULATION MANAGEMENT     Oh Solares 77 year old male is on warfarin with therapeutic INR result. (Goal INR 2.0-3.0)    Recent labs: (last 7 days)     06/20/22  1138   INR 2.7*       ASSESSMENT       Source(s): Chart Review and Patient/Caregiver Call       Warfarin doses taken: Warfarin taken as instructed    Diet: No new diet changes identified    New illness, injury, or hospitalization: No    Medication/supplement changes: None noted    Signs or symptoms of bleeding or clotting: No    Previous INR: Subtherapeutic    Additional findings: None       PLAN     Recommended plan for no diet, medication or health factor changes affecting INR     Dosing Instructions: continue your current warfarin dose with next INR in 6 weeks       Summary  As of 6/20/2022    Full warfarin instructions:  2.5 mg every Mon; 5 mg all other days   Next INR check:  8/1/2022             Telephone call with Oh who verbalizes understanding and agrees to plan and who agrees to plan and repeated back plan correctly    Lab visit scheduled    Education provided: Goal range and significance of current result, Importance of therapeutic range and Contact [phone]  with any changes, questions or concerns.     Plan made per [facility] anticoagulation protocol    Kristen Velazquez RN  Anticoagulation Clinic  6/20/2022    _______________________________________________________________________     Anticoagulation Episode Summary     Current INR goal:  2.0-3.0   TTR:  84.2 % (1 y)   Target end date:  Indefinite   Send INR reminders to:  JESSICA MOY    Indications    Coagulation defect (HCC) [D68.9] [D68.9]  Factor V Leiden mutation (H) [D68.51]  Long-term (current) use of anticoagulants [Z79.01] [Z79.01]           Comments:           Anticoagulation Care Providers     Provider Role Specialty Phone number    Brendon Kay MD Referring Internal Medicine - Pediatrics [phone]    Karina Galvez PA-C Referring Family Medicine  [phone]

## 2022-06-27 DIAGNOSIS — F51.01 PRIMARY INSOMNIA: ICD-10-CM

## 2022-06-28 RX ORDER — ZOLPIDEM TARTRATE 10 MG/1
TABLET ORAL
Qty: 31 TABLET | Refills: 0 | Status: SHIPPED | OUTPATIENT
Start: 2022-06-28 | End: 2022-07-29

## 2022-06-28 NOTE — TELEPHONE ENCOUNTER
Requested Prescriptions   Pending Prescriptions Disp Refills     zolpidem (AMBIEN) 10 MG tablet [Pharmacy Med Name: ZOLPIDEM TARTRATE 10 MG TABLET] 31 tablet 0     Sig: TAKE 1 TABLET BY MOUTH EVERY DAY AT BEDTIME AS NEEDED       There is no refill protocol information for this order        Routing refill request to provider for review/approval because:  Drug not on the [facility] refill protocol

## 2022-07-05 DIAGNOSIS — G89.29 CHRONIC BILATERAL LOW BACK PAIN WITHOUT SCIATICA: ICD-10-CM

## 2022-07-05 DIAGNOSIS — G89.4 CHRONIC PAIN SYNDROME: ICD-10-CM

## 2022-07-05 DIAGNOSIS — M54.50 CHRONIC BILATERAL LOW BACK PAIN WITHOUT SCIATICA: ICD-10-CM

## 2022-07-05 NOTE — TELEPHONE ENCOUNTER
Reason for Call:  Other prescription    Detailed comments: patient needs a refill hydrocodone    Phone Number Patient can be reached at: Work number on file:  There is no work phone number on file. or Cell number on file:    Telephone Information:   Mobile [phone]       Best Time: anytime     Can we leave a detailed message on this number? YES    Call taken on 7/5/2022 at 12:48 PM by Irasema Sanchez

## 2022-07-06 RX ORDER — HYDROCODONE BITARTRATE AND ACETAMINOPHEN 5; 325 MG/1; MG/1
1 TABLET ORAL EVERY 6 HOURS PRN
Qty: 30 TABLET | Refills: 0 | Status: SHIPPED | OUTPATIENT
Start: 2022-07-06 | End: 2022-09-06

## 2022-07-06 NOTE — TELEPHONE ENCOUNTER
Routing refill request to provider for review/approval because:  Drug not on the [facility] refill protocol     Thanks,  Alysha RN  [facility]

## 2022-07-14 DIAGNOSIS — Z79.01 LONG TERM CURRENT USE OF ANTICOAGULANT THERAPY: ICD-10-CM

## 2022-07-14 DIAGNOSIS — D68.51 FACTOR V LEIDEN MUTATION (H): Primary | ICD-10-CM

## 2022-07-15 ENCOUNTER — OFFICE VISIT (OUTPATIENT)
Dept: DERMATOLOGY | Facility: CLINIC | Age: 77
End: 2022-07-15
Attending: INTERNAL MEDICINE
Payer: COMMERCIAL

## 2022-07-15 DIAGNOSIS — D48.9 NEOPLASM OF UNCERTAIN BEHAVIOR: ICD-10-CM

## 2022-07-15 DIAGNOSIS — L81.4 LENTIGINES: ICD-10-CM

## 2022-07-15 DIAGNOSIS — L82.1 SEBORRHEIC KERATOSES: ICD-10-CM

## 2022-07-15 DIAGNOSIS — D22.9 MULTIPLE BENIGN NEVI: Primary | ICD-10-CM

## 2022-07-15 DIAGNOSIS — D18.01 CHERRY ANGIOMA: ICD-10-CM

## 2022-07-15 DIAGNOSIS — L57.0 AK (ACTINIC KERATOSIS): ICD-10-CM

## 2022-07-15 DIAGNOSIS — Z12.83 ENCOUNTER FOR SCREENING FOR MALIGNANT NEOPLASM OF SKIN: ICD-10-CM

## 2022-07-15 PROCEDURE — 17000 DESTRUCT PREMALG LESION: CPT | Mod: XS | Performed by: NURSE PRACTITIONER

## 2022-07-15 PROCEDURE — 88305 TISSUE EXAM BY PATHOLOGIST: CPT | Performed by: DERMATOLOGY

## 2022-07-15 PROCEDURE — 11102 TANGNTL BX SKIN SINGLE LES: CPT | Performed by: NURSE PRACTITIONER

## 2022-07-15 PROCEDURE — 99203 OFFICE O/P NEW LOW 30 MIN: CPT | Mod: 25 | Performed by: NURSE PRACTITIONER

## 2022-07-15 PROCEDURE — 11103 TANGNTL BX SKIN EA SEP/ADDL: CPT | Performed by: NURSE PRACTITIONER

## 2022-07-15 ASSESSMENT — PAIN SCALES - GENERAL: PAINLEVEL: NO PAIN (0)

## 2022-07-15 NOTE — PROGRESS NOTES
[facility] Dermatology Note  Encounter Date: Jul 15, 2022  Office Visit     Reviewed patients past medical history and pertinent chart review prior to patients visit today.     Dermatology Problem List:  History of actinic keratosis treated at internal medicine from left flank  Patient denies personal history of skin cancer or dysplastic nevi.    Patient denies family history of skin cancer or dysplastic nevi.      ____________________________________________    Assessment & Plan:     # Neoplasm of uncertain behavior:  Left chest  DDx includes BCC. Shave biopsy today.  # Neoplasm of uncertain behavior:  Right chest  DDx includes BCC. Shave biopsy today.    Procedure Note: Biopsy by shave technique  The risks and benefits of the procedure were described to the patient. These include but are not limited to bleeding, infection, scar, incomplete removal, and non-diagnostic biopsy. Verbal informed consent was obtained. The above site(s) was cleansed with an alcohol pad and injected with 1% lidocaine with epinephrine. Once anesthesia was obtained, a biopsy(ies) was performed with Gilette blade. The tissue(s) was placed in a labeled container(s) with formalin and sent to pathology. Hemostasis was achieved with aluminum chloride. Vaseline and a bandage were applied to the wound(s). The patient tolerated the procedure well and was given post biopsy care instructions.     # Actinic keratosis, right forehead. Premalignant nature discussed with patient. Treatment options discussed with patient today including no treatment, topical treatment, and cryotherapy. Patient elects to treat visible lesions today with cryotherapy. After verbal consent and discussion of risks and benefits including but no limited to dyspigmentation/scar, blister, and pain. A total of 1 actinic keratoses were treated with 1-2mm freeze border for 2 cycle with liquid nitrogen. Post cryotherapy instructions were provided.      # Benign  skin findings including: seborrheic keratoses, cherry angioma, lentigines and benign nevi.   - No further intervention required. Patient to report changes.   - Patient reassured of the benign nature of these lesions.    #Signs and Symptoms of non-melanoma skin cancer and ABCDEs of melanoma reviewed with patient. Patient encouraged to perform monthly self skin exams and educated on how to perform them. UV precautions reviewed with patient. Patient was asked about new or changing moles/lesions on body.     #Reviewed Sunscreen: Apply 20 minutes prior to going outdoors and reapply every two hours, when wet or sweating. We recommend using an SPF 30 or higher, and to use one that is water resistant.       Follow-up:  1 years for follow up full body skin exam, prn for new or changing lesions or new concerns    Lisa Marcano, HOLLIS CNP on 7/15/2022 at 7:28 AM    ____________________________________________    CC: Skin Check (Full body skin check/Area of concern: Had a spot that was frozen off on his side (left ribcage area). Says its doing fine, but wants it to be looked at )    HPI:  Mr. Oh Solares is a(n) 77 year old male who presents today as a new patient for a full body skin cancer screening. Patient has no concerns today.     Patient is otherwise feeling well, without additional skin concerns.     Physical Exam:  Vitals: There were no vitals taken for this visit.  SKIN: Total skin excluding the genitalia areas was performed. The exam included the head/face, neck, both arms, chest, back, abdomen, both legs, digits, mons pubis, buttock and nails.   -left chest, 1.4 x 0.9 cm irregular pink macule with brown pigmentation without network scattered throughout  -right chest, 5 mm pink shiny papule with surface vessels on dermoscopy  -left superior flank, well healed scar without sign of recurrent actinic keratosis   -right forehead, pink/brown slightly textured macule   -several 1-2mm red dome shaped symmetric  papules scattered on the trunk  -multiple tan/brown flat round macules and raised papules scattered throughout trunk, extremities and head. No worrisome features for malignancy noted on examination.  -scattered tan, homogenous macules scattered on sun exposed areas of trunk, extremities and face.   -scattered waxy, stuck on tan/brown papules and patches on the trunk     - No other lesions of concern on areas examined.     Medications:  Current Outpatient Medications   Medication     ALPRAZolam (XANAX) 0.25 MG tablet     HYDROcodone-acetaminophen (NORCO) 5-325 MG tablet     Pediatric Multiple Vit-Vit C (VITAMIN DAILY PO)     saw palmetto (SERENOA REPENS) 160 MG capsule     TYLENOL 325 MG OR TABS     warfarin ANTICOAGULANT (COUMADIN) 5 MG tablet     zolpidem (AMBIEN) 10 MG tablet     bisacodyl (DULCOLAX) 5 MG EC tablet     Na Sulfate-K Sulfate-Mg Sulf (SUPREP BOWEL PREP) solution     polyethylene glycol (GOLYTELY) 236 g suspension     No current facility-administered medications for this visit.      Past Medical History:   Patient Active Problem List   Diagnosis     Factor V Leiden mutation (H)     GI bleed     ED (erectile dysfunction)     CARDIOVASCULAR SCREENING; LDL GOAL LESS THAN 160     Advanced directives, counseling/discussion     Sleep stage dysfunction     Anxiety     Dupuytren's contracture - left     Chronic pain     Long-term (current) use of anticoagulants [Z79.01]     Coagulation defect (HCC) [D68.9]     Chronic pain syndrome     Past Medical History:   Diagnosis Date     Asthma     as child     ED (erectile dysfunction)      ETOH abuse      Factor V Leiden mutation (H)      Factor V Leiden mutation (H)      GI bleed      Neck pain     chiropractor treatment     Plantar fasciitis      Prostatitis      Renal dysfunction      Sleep stage dysfunction     ambien treats well     Thrombosis 1/02       CC Brendon Kay MD  [address] [facility]  [place] [id number] on close of this encounter  .

## 2022-07-15 NOTE — PATIENT INSTRUCTIONS
Wound Care Instructions     FOR SUPERFICIAL WOUNDS      [phone]          AFTER 24 HOURS YOU SHOULD REMOVE THE BANDAGE AND BEGIN DAILY DRESSING CHANGES AS FOLLOWS:     1) Remove Dressing.     2) Clean and dry the area with tap water using a Q-tip or sterile gauze pad.     3) Apply Vaseline, Aquaphor, Polysporin ointment or Bacitracin ointment over entire wound.  Do NOT use Neosporin ointment.     4) Cover the wound with a band-aid, or a sterile non-stick gauze pad and micropore paper tape      REPEAT THESE INSTRUCTIONS AT LEAST ONCE A DAY UNTIL THE WOUND HAS COMPLETELY HEALED.    It is an old wives tale that a wound heals better when it is exposed to air and allowed to dry out. The wound will heal faster with a better cosmetic result if it is kept moist with ointment and covered with a bandage.    **Do not let the wound dry out.**      Supplies Needed:      *Cotton tipped applicators (Q-tips)    *Polysporin Ointment or Bacitracin Ointment (NOT NEOSPORIN)    *Band-aids or non-stick gauze pads and micropore paper tape.      PATIENT INFORMATION:    During the healing process you will notice a number of changes. All wounds develop a small halo of redness surrounding the wound.  This means healing is occurring. Severe itching with extensive redness usually indicates sensitivity to the ointment or bandage tape used to dress the wound.  You should call our office if this develops.      Swelling  and/or discoloration around your surgical site is common, particularly when performed around the eye.    All wounds normally drain.  The larger the wound the more drainage there will be.  After 7-10 days, you will notice the wound beginning to shrink and new skin will begin to grow.  The wound is healed when you can see skin has formed over the entire area.  A healed wound has a healthy, shiny look to the surface and is red to dark pink in color to normalize.  Wounds may take approximately 4-6 weeks to heal.   Larger wounds may take 6-8 weeks.  After the wound is healed you may discontinue dressing changes.    You may experience a sensation of tightness as your wound heals. This is normal and will gradually subside.    Your healed wound may be sensitive to temperature changes. This sensitivity improves with time, but if you re having a lot of discomfort, try to avoid temperature extremes.    Patients frequently experience itching after their wound appears to have healed because of the continue healing under the skin.  Plain Vaseline will help relieve the itching.        POSSIBLE COMPLICATIONS    BLEEDING:    Leave the bandage in place.  Use tightly rolled up gauze or a cloth to apply direct pressure over the bandage for 30  minutes.  Reapply pressure for an additional 30 minutes if necessary  Use additional gauze and tape to maintain pressure once the bleeding has stopped.

## 2022-07-15 NOTE — LETTER
7/15/2022         RE: Oh Solares  [address]  [place] [address]        Dear Colleague,    Thank you for referring your patient, Oh Solares, to the [facility]. Please see a copy of my visit note below.    [facility] Dermatology Note  Encounter Date: Jul 15, 2022  Office Visit     Reviewed patients past medical history and pertinent chart review prior to patients visit today.     Dermatology Problem List:  History of actinic keratosis treated at internal medicine from left flank  Patient denies personal history of skin cancer or dysplastic nevi.    Patient denies family history of skin cancer or dysplastic nevi.      ____________________________________________    Assessment & Plan:     # Neoplasm of uncertain behavior:  Left chest  DDx includes BCC. Shave biopsy today.  # Neoplasm of uncertain behavior:  Right chest  DDx includes BCC. Shave biopsy today.    Procedure Note: Biopsy by shave technique  The risks and benefits of the procedure were described to the patient. These include but are not limited to bleeding, infection, scar, incomplete removal, and non-diagnostic biopsy. Verbal informed consent was obtained. The above site(s) was cleansed with an alcohol pad and injected with 1% lidocaine with epinephrine. Once anesthesia was obtained, a biopsy(ies) was performed with Gilette blade. The tissue(s) was placed in a labeled container(s) with formalin and sent to pathology. Hemostasis was achieved with aluminum chloride. Vaseline and a bandage were applied to the wound(s). The patient tolerated the procedure well and was given post biopsy care instructions.     # Actinic keratosis, right forehead. Premalignant nature discussed with patient. Treatment options discussed with patient today including no treatment, topical treatment, and cryotherapy. Patient elects to treat visible lesions today with cryotherapy. After verbal consent and  discussion of risks and benefits including but no limited to dyspigmentation/scar, blister, and pain. A total of 1 actinic keratoses were treated with 1-2mm freeze border for 2 cycle with liquid nitrogen. Post cryotherapy instructions were provided.      # Benign skin findings including: seborrheic keratoses, cherry angioma, lentigines and benign nevi.   - No further intervention required. Patient to report changes.   - Patient reassured of the benign nature of these lesions.    #Signs and Symptoms of non-melanoma skin cancer and ABCDEs of melanoma reviewed with patient. Patient encouraged to perform monthly self skin exams and educated on how to perform them. UV precautions reviewed with patient. Patient was asked about new or changing moles/lesions on body.     #Reviewed Sunscreen: Apply 20 minutes prior to going outdoors and reapply every two hours, when wet or sweating. We recommend using an SPF 30 or higher, and to use one that is water resistant.       Follow-up:  1 years for follow up full body skin exam, prn for new or changing lesions or new concerns    Lisa Marcano, APRN CNP on 7/15/2022 at 7:28 AM    ____________________________________________    CC: Skin Check (Full body skin check/Area of concern: Had a spot that was frozen off on his side (left ribcage area). Says its doing fine, but wants it to be looked at )    HPI:  Mr. Oh Solares is a(n) 77 year old male who presents today as a new patient for a full body skin cancer screening. Patient has no concerns today.     Patient is otherwise feeling well, without additional skin concerns.     Physical Exam:  Vitals: There were no vitals taken for this visit.  SKIN: Total skin excluding the genitalia areas was performed. The exam included the head/face, neck, both arms, chest, back, abdomen, both legs, digits, mons pubis, buttock and nails.   -left chest, 1.4 x 0.9 cm irregular pink macule with brown pigmentation without network scattered  throughout  -right chest, 5 mm pink shiny papule with surface vessels on dermoscopy  -left superior flank, well healed scar without sign of recurrent actinic keratosis   -right forehead, pink/brown slightly textured macule   -several 1-2mm red dome shaped symmetric papules scattered on the trunk  -multiple tan/brown flat round macules and raised papules scattered throughout trunk, extremities and head. No worrisome features for malignancy noted on examination.  -scattered tan, homogenous macules scattered on sun exposed areas of trunk, extremities and face.   -scattered waxy, stuck on tan/brown papules and patches on the trunk     - No other lesions of concern on areas examined.     Medications:  Current Outpatient Medications   Medication     ALPRAZolam (XANAX) 0.25 MG tablet     HYDROcodone-acetaminophen (NORCO) 5-325 MG tablet     Pediatric Multiple Vit-Vit C (VITAMIN DAILY PO)     saw palmetto (SERENOA REPENS) 160 MG capsule     TYLENOL 325 MG OR TABS     warfarin ANTICOAGULANT (COUMADIN) 5 MG tablet     zolpidem (AMBIEN) 10 MG tablet     bisacodyl (DULCOLAX) 5 MG EC tablet     Na Sulfate-K Sulfate-Mg Sulf (SUPREP BOWEL PREP) solution     polyethylene glycol (GOLYTELY) 236 g suspension     No current facility-administered medications for this visit.      Past Medical History:   Patient Active Problem List   Diagnosis     Factor V Leiden mutation (H)     GI bleed     ED (erectile dysfunction)     CARDIOVASCULAR SCREENING; LDL GOAL LESS THAN 160     Advanced directives, counseling/discussion     Sleep stage dysfunction     Anxiety     Dupuytren's contracture - left     Chronic pain     Long-term (current) use of anticoagulants [Z79.01]     Coagulation defect (HCC) [D68.9]     Chronic pain syndrome     Past Medical History:   Diagnosis Date     Asthma     as child     ED (erectile dysfunction)      ETOH abuse      Factor V Leiden mutation (H)      Factor V Leiden mutation (H)      GI bleed      Neck pain      chiropractor treatment     Plantar fasciitis      Prostatitis      Renal dysfunction      Sleep stage dysfunction     ambien treats well     Thrombosis 1/02       CC Brendon Kay MD  [address] [place] [address] on close of this encounter  .              Again, thank you for allowing me to participate in the care of your patient.        Sincerely,        HOLLIS Thompson CNP

## 2022-07-18 LAB
PATH REPORT.COMMENTS IMP SPEC: ABNORMAL
PATH REPORT.COMMENTS IMP SPEC: ABNORMAL
PATH REPORT.COMMENTS IMP SPEC: YES
PATH REPORT.FINAL DX SPEC: ABNORMAL
PATH REPORT.GROSS SPEC: ABNORMAL
PATH REPORT.MICROSCOPIC SPEC OTHER STN: ABNORMAL
PATH REPORT.RELEVANT HX SPEC: ABNORMAL

## 2022-07-20 ENCOUNTER — TELEPHONE (OUTPATIENT)
Dept: DERMATOLOGY | Facility: CLINIC | Age: 77
End: 2022-07-20

## 2022-07-20 NOTE — TELEPHONE ENCOUNTER
Spoke w patient and gave results. Explained what BCC and MOHS are.     Scheduled for MOHS x2 at [facility] on:  9/26 at 8 AM &  10/3 at 8 AM.     MOHS packet mailed w Tian letter .    Patient expressed understanding and had no questions.    Yue JOHNSON RN  [facility]  [phone]

## 2022-07-20 NOTE — TELEPHONE ENCOUNTER
----- Message from HOLLIS Esparza CNP sent at 7/20/2022  6:55 AM CDT -----  Please call patient and let them know both spots on the chest were BCC as we had expected.  Basal Cell skin cancer is the most common type of skin cancer, and usually caused by years of sun exposure. This is a very slow growing skin cancer that rarely spreads elsewhere in the body. Please schedule him or excision x2.     Ways to reduce your risk of more skin cancer are to wear clothing and sunscreen when outdoors, and using a daily facial moisturizer with at least SPF 30 or higher on a daily basis on your face, ears and neck.

## 2022-07-20 NOTE — LETTER
[facility]  [address] [place] [address]  [phone]      July 20, 2022    Oh Solares  [address] [address]      Dear Oh      You are scheduled for Mohs Surgery on:   Monday September 26th, 2022 at 8 AM and  Monday October 3rd, 2022 at 8 AM    Please check in at 2nd floor Dermatology Clinic.     You don't need to arrive more than 5-10 minutes prior to your appointment time.     Be sure to eat a good breakfast and bathe and wash your hair prior to Surgery. Please bring  with you if this is above your neck    If you are taking any anti-coagulants that are prescribed by your Doctor (such as Coumadin/warfarin, Plavix, Aspirin, Ibuprofen), please continue taking them.     However, If you are taking anti-coagulants over the counter without  a Doctor's order for a Medical condition, please discontinue them 10 days prior to Surgery.      Please wear loose comfortable clothing as it could possibly be 4-6 hours until your surgery is completed depending upon how many layers of tissue need to be removed.     Thank you,    Stanton Overton MD

## 2022-07-28 DIAGNOSIS — F51.01 PRIMARY INSOMNIA: ICD-10-CM

## 2022-07-29 RX ORDER — ZOLPIDEM TARTRATE 10 MG/1
TABLET ORAL
Qty: 31 TABLET | Refills: 0 | Status: SHIPPED | OUTPATIENT
Start: 2022-07-29 | End: 2022-08-29

## 2022-07-29 NOTE — TELEPHONE ENCOUNTER
Routing refill request to provider for review/approval because:  Drug not on the [facility] refill protocol       Susana GILLESPIE RN, BSN  [facility]

## 2022-07-30 ENCOUNTER — TRANSFERRED RECORDS (OUTPATIENT)
Dept: HEALTH INFORMATION MANAGEMENT | Facility: CLINIC | Age: 77
End: 2022-07-30

## 2022-08-01 ENCOUNTER — LAB (OUTPATIENT)
Dept: LAB | Facility: CLINIC | Age: 77
End: 2022-08-01
Payer: COMMERCIAL

## 2022-08-01 ENCOUNTER — ANTICOAGULATION THERAPY VISIT (OUTPATIENT)
Dept: ANTICOAGULATION | Facility: CLINIC | Age: 77
End: 2022-08-01

## 2022-08-01 DIAGNOSIS — D68.51 FACTOR V LEIDEN MUTATION (H): ICD-10-CM

## 2022-08-01 DIAGNOSIS — D68.9 COAGULATION DEFECT (H): Primary | ICD-10-CM

## 2022-08-01 DIAGNOSIS — Z79.01 LONG TERM CURRENT USE OF ANTICOAGULANT THERAPY: ICD-10-CM

## 2022-08-01 LAB — INR BLD: 3.2 (ref 0.9–1.1)

## 2022-08-01 PROCEDURE — 36415 COLL VENOUS BLD VENIPUNCTURE: CPT

## 2022-08-01 PROCEDURE — 85610 PROTHROMBIN TIME: CPT

## 2022-08-01 NOTE — PROGRESS NOTES
ANTICOAGULATION MANAGEMENT     Oh Solares 77 year old male is on warfarin with supratherapeutic INR result. (Goal INR 2.0-3.0)    Recent labs: (last 7 days)     08/01/22  1113   INR 3.2*       ASSESSMENT       Source(s): Chart Review and Patient/Caregiver Call       Warfarin doses taken: Warfarin taken as instructed    Diet: Decreased greens/vitamin K in diet; plans to resume previous intake    New illness, injury, or hospitalization: No    Medication/supplement changes: None noted    Signs or symptoms of bleeding or clotting: No    Previous INR: Therapeutic last 2(+) visits    Additional findings: None       PLAN     Recommended plan for no diet, medication or health factor changes affecting INR     Dosing Instructions: Continue your current warfarin dose with next INR in 2 weeks       Summary  As of 8/1/2022    Full warfarin instructions:  2.5 mg every Mon; 5 mg all other days   Next INR check:               Telephone call with Oh who verbalizes understanding and agrees to plan    Lab visit scheduled, would like to go back to 6 week schedule if in range      Education provided: Goal range and significance of current result, Importance of therapeutic range and Contact [phone]  with any changes, questions or concerns.     Plan made per [facility] anticoagulation protocol    Kristen Velazquez, RN  Anticoagulation Clinic  8/1/2022    _______________________________________________________________________     Anticoagulation Episode Summary     Current INR goal:  2.0-3.0   TTR:  82.3 % (1 y)   Target end date:  Indefinite   Send INR reminders to:  JESSICA [place]    Indications    Coagulation defect (HCC) [D68.9] [D68.9]  Factor V Leiden mutation (H) [D68.51]  Long-term (current) use of anticoagulants [Z79.01] [Z79.01]           Comments:           Anticoagulation Care Providers     Provider Role Specialty Phone number    Brendon Kay MD Referring Internal Medicine - Pediatrics [phone]    Leonor  Karina Holguin PA-C [facility] [phone]

## 2022-08-04 DIAGNOSIS — F41.9 ANXIETY: ICD-10-CM

## 2022-08-04 RX ORDER — ALPRAZOLAM 0.25 MG
TABLET ORAL
Qty: 30 TABLET | Refills: 0 | Status: SHIPPED | OUTPATIENT
Start: 2022-08-04 | End: 2022-10-13

## 2022-08-04 NOTE — TELEPHONE ENCOUNTER
Routing refill request to provider for review/approval because:  Drug not on the [facility] refill protocol     Requested Prescriptions   Pending Prescriptions Disp Refills    ALPRAZolam (XANAX) 0.25 MG tablet [Pharmacy Med Name: ALPRAZOLAM 0.25 MG TABLET] 30 tablet 0     Sig: TAKE 1 TABLET BY MOUTH THREE TIMES A DAY AS NEEDED FOR ANXIETY        There is no refill protocol information for this order            Milly Boykin RN   [facility]

## 2022-08-11 ENCOUNTER — DOCUMENTATION ONLY (OUTPATIENT)
Dept: ANTICOAGULATION | Facility: CLINIC | Age: 77
End: 2022-08-11

## 2022-08-11 DIAGNOSIS — D68.9 COAGULATION DEFECT (H): ICD-10-CM

## 2022-08-11 DIAGNOSIS — D68.51 FACTOR V LEIDEN MUTATION (H): Primary | ICD-10-CM

## 2022-08-11 NOTE — PROGRESS NOTES
ANTICOAGULATION CLINIC REFERRAL RENEWAL REQUEST       An annual renewal order is required for all patients referred to [facility].?  Please review and sign the pended referral order for Oh Solares.       ANTICOAGULATION SUMMARY      Warfarin indication(s)   Heterozygous Factor V Leiden    Mechanical heart valve present?  NO       Current goal range   INR: 2.0-3.0     Goal appropriate for indication? Goal INR 2-3, standard for indication(s) above     Time in Therapeutic Range (TTR)  (Goal > 60%) 82.3%       Office visit with referring provider's group within last year yes on 4/18/22       Radha Esquivel RN  [facility]

## 2022-08-15 ENCOUNTER — LAB (OUTPATIENT)
Dept: LAB | Facility: CLINIC | Age: 77
End: 2022-08-15
Payer: COMMERCIAL

## 2022-08-15 ENCOUNTER — ANTICOAGULATION THERAPY VISIT (OUTPATIENT)
Dept: ANTICOAGULATION | Facility: CLINIC | Age: 77
End: 2022-08-15

## 2022-08-15 DIAGNOSIS — D68.9 COAGULATION DEFECT (H): Primary | ICD-10-CM

## 2022-08-15 DIAGNOSIS — Z79.01 LONG TERM CURRENT USE OF ANTICOAGULANT THERAPY: ICD-10-CM

## 2022-08-15 DIAGNOSIS — D68.51 FACTOR V LEIDEN MUTATION (H): ICD-10-CM

## 2022-08-15 LAB — INR BLD: 2.1 (ref 0.9–1.1)

## 2022-08-15 PROCEDURE — 36416 COLLJ CAPILLARY BLOOD SPEC: CPT

## 2022-08-15 PROCEDURE — 85610 PROTHROMBIN TIME: CPT

## 2022-08-15 NOTE — PROGRESS NOTES
ANTICOAGULATION MANAGEMENT     Oh Solares 77 year old male is on warfarin with therapeutic INR result. (Goal INR 2.0-3.0)    Recent labs: (last 7 days)     08/15/22  1104   INR 2.1*       ASSESSMENT       Source(s): Chart Review and Patient/Caregiver Call       Warfarin doses taken: Warfarin taken as instructed    Diet: No new diet changes identified    New illness, injury, or hospitalization: No    Medication/supplement changes: None noted    Signs or symptoms of bleeding or clotting: No    Previous INR: Supratherapeutic    Additional findings: states he had covid around the time of his last elevated inr       PLAN     Recommended plan for no diet, medication or health factor changes affecting INR     Dosing Instructions: Continue your current warfarin dose with next INR in 4 weeks       Summary  As of 8/15/2022    Full warfarin instructions:  2.5 mg every Mon; 5 mg all other days   Next INR check:  9/15/2022             Telephone call with Oh who verbalizes understanding and agrees to plan    Lab visit scheduled    Education provided: Importance of therapeutic range, Importance of following up at instructed interval, Importance of taking warfarin as instructed, Monitoring for bleeding signs and symptoms, Monitoring for clotting signs and symptoms, Importance of notifying clinic for changes in medications; a sooner lab recheck maybe needed. and Contact [phone]  with any changes, questions or concerns.     Plan made per [facility] anticoagulation protocol    Kristen Velazquez RN  Anticoagulation Clinic  8/15/2022    _______________________________________________________________________     Anticoagulation Episode Summary     Current INR goal:  2.0-3.0   TTR:  81.6 % (1 y)   Target end date:  Indefinite   Send INR reminders to:  JESSICA MOY    Indications    Coagulation defect (HCC) [D68.9] [D68.9]  Factor V Leiden mutation (H) [D68.51]  Long-term (current) use of anticoagulants [Z79.01] [Z79.01]            Comments:           Anticoagulation Care Providers     Provider Role Specialty Phone number    Brendon Kay MD Referring Internal Medicine - Pediatrics [phone]    Karina Galvez PA-C Referring Family Medicine [phone]

## 2022-08-29 DIAGNOSIS — F51.01 PRIMARY INSOMNIA: ICD-10-CM

## 2022-08-29 RX ORDER — ZOLPIDEM TARTRATE 10 MG/1
TABLET ORAL
Qty: 31 TABLET | Refills: 0 | Status: SHIPPED | OUTPATIENT
Start: 2022-08-29 | End: 2022-09-28

## 2022-09-02 DIAGNOSIS — M54.50 CHRONIC BILATERAL LOW BACK PAIN WITHOUT SCIATICA: ICD-10-CM

## 2022-09-02 DIAGNOSIS — G89.4 CHRONIC PAIN SYNDROME: ICD-10-CM

## 2022-09-02 DIAGNOSIS — G89.29 CHRONIC BILATERAL LOW BACK PAIN WITHOUT SCIATICA: ICD-10-CM

## 2022-09-02 NOTE — TELEPHONE ENCOUNTER
Requested Prescriptions   Pending Prescriptions Disp Refills     HYDROcodone-acetaminophen (NORCO) 5-325 MG tablet 30 tablet 0     Sig: Take 1 tablet by mouth every 6 hours as needed for pain       There is no refill protocol information for this order        Routing refill request to provider for review/approval because:  Drug not on the [facility] refill protocol     [phone],  Ok for detailed vm.     Thanks,  Alysha RN  [facility]

## 2022-09-06 RX ORDER — HYDROCODONE BITARTRATE AND ACETAMINOPHEN 5; 325 MG/1; MG/1
1 TABLET ORAL EVERY 6 HOURS PRN
Qty: 30 TABLET | Refills: 0 | Status: SHIPPED | OUTPATIENT
Start: 2022-09-06 | End: 2022-10-05

## 2022-09-22 ENCOUNTER — ANTICOAGULATION THERAPY VISIT (OUTPATIENT)
Dept: ANTICOAGULATION | Facility: CLINIC | Age: 77
End: 2022-09-22

## 2022-09-22 ENCOUNTER — LAB (OUTPATIENT)
Dept: LAB | Facility: CLINIC | Age: 77
End: 2022-09-22
Payer: COMMERCIAL

## 2022-09-22 DIAGNOSIS — D68.9 COAGULATION DEFECT (H): Primary | ICD-10-CM

## 2022-09-22 DIAGNOSIS — D68.51 FACTOR V LEIDEN MUTATION (H): ICD-10-CM

## 2022-09-22 DIAGNOSIS — Z79.01 LONG TERM CURRENT USE OF ANTICOAGULANT THERAPY: ICD-10-CM

## 2022-09-22 LAB — INR BLD: 2 (ref 0.9–1.1)

## 2022-09-22 PROCEDURE — 36416 COLLJ CAPILLARY BLOOD SPEC: CPT

## 2022-09-22 PROCEDURE — 85610 PROTHROMBIN TIME: CPT

## 2022-09-22 NOTE — PROGRESS NOTES
ANTICOAGULATION MANAGEMENT     Oh Solares 77 year old male is on warfarin with therapeutic INR result. (Goal INR 2.0-3.0)    Recent labs: (last 7 days)     09/22/22  0803   INR 2.0*       ASSESSMENT       Source(s): Chart Review and Patient/Caregiver Call       Warfarin doses taken: Warfarin taken as instructed    Diet: No new diet changes identified    New illness, injury, or hospitalization: No    Medication/supplement changes: None noted    Signs or symptoms of bleeding or clotting: No    Previous INR: Therapeutic last 2(+) visits    Additional findings: None       PLAN     Recommended plan for no diet, medication or health factor changes affecting INR     Dosing Instructions: Continue your current warfarin dose with next INR in 6 weeks       Summary  As of 9/22/2022    Full warfarin instructions:  2.5 mg every Mon; 5 mg all other days   Next INR check:  11/3/2022             Telephone call with Oh who verbalizes understanding and agrees to plan    Lab visit scheduled    Education provided: Goal range and significance of current result    Plan made per [facility] anticoagulation protocol    Danay Pradhan RN  Anticoagulation Clinic  9/22/2022    _______________________________________________________________________     Anticoagulation Episode Summary     Current INR goal:  2.0-3.0   TTR:  81.6 % (1 y)   Target end date:  Indefinite   Send INR reminders to:  JESSICA MOY    Indications    Coagulation defect (HCC) [D68.9] [D68.9]  Factor V Leiden mutation (H) [D68.51]  Long-term (current) use of anticoagulants [Z79.01] [Z79.01]           Comments:           Anticoagulation Care Providers     Provider Role Specialty Phone number    Brendon Kay MD Referring Internal Medicine - Pediatrics [phone]    Karina Galvez PA-C Referring Family Medicine [phone]

## 2022-09-26 ENCOUNTER — TELEPHONE (OUTPATIENT)
Dept: DERMATOLOGY | Facility: CLINIC | Age: 77
End: 2022-09-26

## 2022-09-26 ENCOUNTER — OFFICE VISIT (OUTPATIENT)
Dept: DERMATOLOGY | Facility: CLINIC | Age: 77
End: 2022-09-26
Payer: COMMERCIAL

## 2022-09-26 VITALS — SYSTOLIC BLOOD PRESSURE: 123 MMHG | OXYGEN SATURATION: 98 % | DIASTOLIC BLOOD PRESSURE: 84 MMHG | HEART RATE: 77 BPM

## 2022-09-26 DIAGNOSIS — L82.1 SEBORRHEIC KERATOSIS: ICD-10-CM

## 2022-09-26 DIAGNOSIS — D18.01 ANGIOMA OF SKIN: ICD-10-CM

## 2022-09-26 DIAGNOSIS — D23.9 DERMAL NEVUS: ICD-10-CM

## 2022-09-26 DIAGNOSIS — C44.519 BASAL CELL CARCINOMA OF ANTERIOR CHEST: Primary | ICD-10-CM

## 2022-09-26 DIAGNOSIS — L81.4 LENTIGO: ICD-10-CM

## 2022-09-26 PROCEDURE — 99213 OFFICE O/P EST LOW 20 MIN: CPT | Mod: 25 | Performed by: DERMATOLOGY

## 2022-09-26 PROCEDURE — 11602 EXC TR-EXT MAL+MARG 1.1-2 CM: CPT | Performed by: DERMATOLOGY

## 2022-09-26 PROCEDURE — 88331 PATH CONSLTJ SURG 1 BLK 1SPC: CPT | Performed by: DERMATOLOGY

## 2022-09-26 PROCEDURE — 11603 EXC TR-EXT MAL+MARG 2.1-3 CM: CPT | Performed by: DERMATOLOGY

## 2022-09-26 PROCEDURE — 12032 INTMD RPR S/A/T/EXT 2.6-7.5: CPT | Performed by: DERMATOLOGY

## 2022-09-26 NOTE — TELEPHONE ENCOUNTER
[facility] Call Center    Phone Message    May a detailed message be left on voicemail: yes     Reason for Call: Other: Pt states he needs to change his 10/3/22 bandage change visit from 1pm to 3pm. Please call Pt back to discuss.      Thank you.     Action Taken: Other: [place] [facility]    Travel Screening: Not Applicable

## 2022-09-26 NOTE — LETTER
2022         RE: Oh Solares  [address] [facility] [id number]        Dear Colleague,    Thank you for referring your patient, Oh Solares, to the [facility]. Please see a copy of my visit note below.    Surgical Office Location :   [facility]  [address] [place] [address], [place] [address]      Oh Solares is an extremely pleasant 77 year old year old male patient I was asked to see by ABHISHEK Marcano for basal cell carcinoma on chest.  Patient has no other skin complaints today.  Remainder of the HPI, Meds, PMH, Allergies, FH, and SH was reviewed in chart.      Past Medical History:   Diagnosis Date     Asthma     as child     ED (erectile dysfunction)      ETOH abuse      Factor V Leiden mutation (H)      Factor V Leiden mutation (H)      GI bleed      Neck pain     chiropractor treatment     Plantar fasciitis      Prostatitis      Renal dysfunction      Sleep stage dysfunction     ambien treats well     Thrombosis        Past Surgical History:   Procedure Laterality Date     C VENOUS THROMBOSIS STUDY       CLOSED RX ELBOW DISLOCATION  96    left     COLECTOMY  2002     COLONOSCOPY       COLONOSCOPY WITH CO2 INSUFFLATION N/A 9/3/2014    Procedure: COLONOSCOPY WITH CO2 INSUFFLATION;  Surgeon: West Jerez MD;  Location: [facility]     COLONOSCOPY WITH CO2 INSUFFLATION N/A 2020    Procedure: COLONOSCOPY, WITH CO2 INSUFFLATION;  Surgeon: Jonathan Rivera MD;  Location: [facility]     SMALL BOWEL RESECTION      partial     VASECTOMY          No family history on file.    Social History     Socioeconomic History     Marital status:      Spouse name: Not on file     Number of children: Not on file     Years of education: Not on file     Highest education level: Not on file   Occupational History     Not on file   Tobacco Use     Smoking status: Former Smoker     Quit date: 4/15/1984     Years since quittin.4     Smokeless  tobacco: Never Used   Vaping Use     Vaping Use: Never used   Substance and Sexual Activity     Alcohol use: Yes     Comment: 1 beer every  day     Drug use: No     Sexual activity: Never     Partners: Female   Other Topics Concern     Parent/sibling w/ CABG, MI or angioplasty before 65F 55M? No   Social History Narrative     Not on file     Social Determinants of Health     Financial Resource Strain: Not on file   Food Insecurity: Not on file   Transportation Needs: Not on file   Physical Activity: Not on file   Stress: Not on file   Social Connections: Not on file   Intimate Partner Violence: Not on file   Housing Stability: Not on file       Outpatient Encounter Medications as of 9/26/2022   Medication Sig Dispense Refill     ALPRAZolam (XANAX) 0.25 MG tablet TAKE 1 TABLET BY MOUTH THREE TIMES A DAY AS NEEDED FOR ANXIETY 30 tablet 0     bisacodyl (DULCOLAX) 5 MG EC tablet Take 1 tablet (5 mg) by mouth See Admin Instructions --Day prior to procedure: Take 1 tablet bisacodyl at 12:00. (Patient not taking: No sig reported) 1 tablet 0     HYDROcodone-acetaminophen (NORCO) 5-325 MG tablet Take 1 tablet by mouth every 6 hours as needed for pain 30 tablet 0     Na Sulfate-K Sulfate-Mg Sulf (SUPREP BOWEL PREP) solution Take 177 mLs (1 Bottle) by mouth See Admin Instructions -Evening before procedure complete steps 1 through 4 (see package) using (1) 6 ounce bottle before bed.  Morning of procedure, repeat steps 1 through 4 using the other 6 ounce bottle. (Patient not taking: No sig reported) 2 Bottle 0     Pediatric Multiple Vit-Vit C (VITAMIN DAILY PO) Take 500 mg by mouth daily.       polyethylene glycol (GOLYTELY) 236 g suspension Take 4,000 mLs (4 L) by mouth See Admin Instructions -Day prior to procedure: 1. Take 1 tablet bisacodyl at 12:00.  2. Mix GoLytely as directed on container.  3.  At 6:00 PM, drink an 8 oz. glass of solution and continue drinking an 8 oz. glass every 15 minutes until bottle is empty.  (Patient not taking: No sig reported) 4 L 0     saw palmetto (SERENOA REPENS) 160 MG capsule Take 3 capsules by mouth daily. 1 capsule 12     TYLENOL 325 MG OR TABS as needed       warfarin ANTICOAGULANT (COUMADIN) 5 MG tablet TAKE HALF A TAB (2.5MG) EVERY TUE AND 1 TAB (5 MG) ALL OTHER DAYS OR AS DIRECTED BY INR CLINIC 100 tablet 1     zolpidem (AMBIEN) 10 MG tablet TAKE 1 TABLET BY MOUTH EVERY DAY AT BEDTIME AS NEEDED 31 tablet 0     No facility-administered encounter medications on file as of 9/26/2022.             O:   NAD, WDWN, Alert & Oriented, Mood & Affect wnl, Vitals stable   Here today alone   General appearance normal   Vitals stable   Alert, oriented and in no acute distress  R chest 6mm scaly papule   L chest 2cm red plaqaue      Stuck on papules and brown macules on trunk and ext   Red papules on trunk  Flesh colored papules on trunk         Eyes: Conjunctivae/lids:Normal     ENT: Lips, buccal mucosa, tongue: normal    MSK:Normal    Cardiovascular: peripheral edema none    Pulm: Breathing Normal    Lymph Nodes: No Head and Neck Lymphadenopathy     Neuro/Psych: Orientation:Alert and Orientedx3 ; Mood/Affect:normal       MICRO:     L chest (blue red):Unremarkable epidermis, dermis and superficial subcutis.  No concerning areas for malignancy.     R chest (green red):Unremarkable epidermis, dermis and superficial subcutis.  No concerning areas for malignancy.     A/P:  1. Seborrheic keratosis, lentigo, angioma, dermal nevus  2. Basal cell carcinoma chest  It was a pleasure speaking to Oh Solares today.  Previous clinic notes and pertinent laboratory tests were reviewed prior to Oh Solares's visit.  UV precautions reviewed with patient.  Risks of non-melanoma skin cancer discussed with patient   Return to clinic 6 months  PROCEDURE NOTE  R chest basal cell carcinoma   EXCISION OF BASAL CELL CARCINOMA, Margins confirmed with FROZEN SECTIONS AND Second intent: After thorough discussion of  PGACAC, consent obtained, anesthesia and prep, the margins of the lesion were identified and an incision was made encompassing the lesion with 4mm margin. The incisions were made through the skin and down to and including the superficial subcutis.  The lesion was removed en bloc and submitted for frozen section pathologic review. Clear margins obtained (1.4cm).    REPAIR SECOND INTENT: We discussed the options for wound management in full with the patient including risks/benefits/possible outcomes. Decision made to allow the wound to heal by second intention. EBL minimal; complications none; wound care routine.  The patient was discharged in good condition and will return in one month or prn for wound evaluation.       L chest basal cell carcinoma   EXCISION OF BASAL CELL CARCINOMA, Margins confirmed with FROZEN SECTIONS AND INT: After thorough discussion of PGACAC, consent obtained, anesthesia and prep, the margins of the lesion were identified and an elliptical incision was made encompassing the lesion with 4mm margin. The incisions were made through the skin and down to and including the subcutaneous tissue. The lesion was removed en bloc and submitted for frozen section pathologic review. Clear margins obtained (2.8cm).  [place] triangles were excised, The wound edges were undermined until adequate tissue mobility was obtained. hemostasis was achieved. The wound edges were then closed in a layered fashion, being careful not to leave any dead space. Postoperative length was 4 cm.   EBL minimal; complications none; wound care routine. The patient was from the clinic in good condition and will return in one week for wound check.           Again, thank you for allowing me to participate in the care of your patient.        Sincerely,        Stanton Overton MD

## 2022-09-26 NOTE — TELEPHONE ENCOUNTER
[facility] Call Center    Phone Message    May a detailed message be left on voicemail: yes     Reason for Call: Appointment Intake    Referring Provider Name: Dr. Overton  Diagnosis and/or Symptoms: post mohs - bandage change   Pt is scheduled for 10/03 at 1:00, pt would like to reschedule for the same day and different time. Please call pt back to reschedule.   Protocols state: Clinic review (encounter) for this Dx  Thanks     Action Taken: Message routed to:  [facility]    Travel Screening: Not Applicable

## 2022-09-26 NOTE — PROGRESS NOTES
Oh Solares is an extremely pleasant 77 year old year old male patient I was asked to see by ABHISHEK Marcano for basal cell carcinoma on chest.  Patient has no other skin complaints today.  Remainder of the HPI, Meds, PMH, Allergies, FH, and SH was reviewed in chart.      Past Medical History:   Diagnosis Date     Asthma     as child     ED (erectile dysfunction)      ETOH abuse      Factor V Leiden mutation (H)      Factor V Leiden mutation (H)      GI bleed      Neck pain     chiropractor treatment     Plantar fasciitis      Prostatitis      Renal dysfunction      Sleep stage dysfunction     ambien treats well     Thrombosis        Past Surgical History:   Procedure Laterality Date     C VENOUS THROMBOSIS STUDY       CLOSED RX ELBOW DISLOCATION  96    left     COLECTOMY  2002     COLONOSCOPY       COLONOSCOPY WITH CO2 INSUFFLATION N/A 9/3/2014    Procedure: COLONOSCOPY WITH CO2 INSUFFLATION;  Surgeon: West Jerez MD;  Location: [facility]     COLONOSCOPY WITH CO2 INSUFFLATION N/A 2020    Procedure: COLONOSCOPY, WITH CO2 INSUFFLATION;  Surgeon: Jonathan Rivera MD;  Location: [facility]     SMALL BOWEL RESECTION      partial     VASECTOMY          No family history on file.    Social History     Socioeconomic History     Marital status:      Spouse name: Not on file     Number of children: Not on file     Years of education: Not on file     Highest education level: Not on file   Occupational History     Not on file   Tobacco Use     Smoking status: Former Smoker     Quit date: 4/15/1984     Years since quittin.4     Smokeless tobacco: Never Used   Vaping Use     Vaping Use: Never used   Substance and Sexual Activity     Alcohol use: Yes     Comment: 1 beer every  day     Drug use: No     Sexual activity: Never     Partners: Female   Other Topics Concern     Parent/sibling w/ CABG, MI or angioplasty before 65F 55M? No   Social History Narrative     Not on file     Social  Determinants of Health     Financial Resource Strain: Not on file   Food Insecurity: Not on file   Transportation Needs: Not on file   Physical Activity: Not on file   Stress: Not on file   Social Connections: Not on file   Intimate Partner Violence: Not on file   Housing Stability: Not on file       Outpatient Encounter Medications as of 9/26/2022   Medication Sig Dispense Refill     ALPRAZolam (XANAX) 0.25 MG tablet TAKE 1 TABLET BY MOUTH THREE TIMES A DAY AS NEEDED FOR ANXIETY 30 tablet 0     bisacodyl (DULCOLAX) 5 MG EC tablet Take 1 tablet (5 mg) by mouth See Admin Instructions --Day prior to procedure: Take 1 tablet bisacodyl at 12:00. (Patient not taking: No sig reported) 1 tablet 0     HYDROcodone-acetaminophen (NORCO) 5-325 MG tablet Take 1 tablet by mouth every 6 hours as needed for pain 30 tablet 0     Na Sulfate-K Sulfate-Mg Sulf (SUPREP BOWEL PREP) solution Take 177 mLs (1 Bottle) by mouth See Admin Instructions -Evening before procedure complete steps 1 through 4 (see package) using (1) 6 ounce bottle before bed.  Morning of procedure, repeat steps 1 through 4 using the other 6 ounce bottle. (Patient not taking: No sig reported) 2 Bottle 0     Pediatric Multiple Vit-Vit C (VITAMIN DAILY PO) Take 500 mg by mouth daily.       polyethylene glycol (GOLYTELY) 236 g suspension Take 4,000 mLs (4 L) by mouth See Admin Instructions -Day prior to procedure: 1. Take 1 tablet bisacodyl at 12:00.  2. Mix GoLytely as directed on container.  3.  At 6:00 PM, drink an 8 oz. glass of solution and continue drinking an 8 oz. glass every 15 minutes until bottle is empty. (Patient not taking: No sig reported) 4 L 0     saw palmetto (SERENOA REPENS) 160 MG capsule Take 3 capsules by mouth daily. 1 capsule 12     TYLENOL 325 MG OR TABS as needed       warfarin ANTICOAGULANT (COUMADIN) 5 MG tablet TAKE HALF A TAB (2.5MG) EVERY TUE AND 1 TAB (5 MG) ALL OTHER DAYS OR AS DIRECTED BY INR CLINIC 100 tablet 1     zolpidem (AMBIEN)  10 MG tablet TAKE 1 TABLET BY MOUTH EVERY DAY AT BEDTIME AS NEEDED 31 tablet 0     No facility-administered encounter medications on file as of 9/26/2022.             O:   NAD, WDWN, Alert & Oriented, Mood & Affect wnl, Vitals stable   Here today alone   General appearance normal   Vitals stable   Alert, oriented and in no acute distress  R chest 6mm scaly papule   L chest 2cm red plaqaue      Stuck on papules and brown macules on trunk and ext   Red papules on trunk  Flesh colored papules on trunk         Eyes: Conjunctivae/lids:Normal     ENT: Lips, buccal mucosa, tongue: normal    MSK:Normal    Cardiovascular: peripheral edema none    Pulm: Breathing Normal    Lymph Nodes: No Head and Neck Lymphadenopathy     Neuro/Psych: Orientation:Alert and Orientedx3 ; Mood/Affect:normal       MICRO:     L chest (blue red):Unremarkable epidermis, dermis and superficial subcutis.  No concerning areas for malignancy.     R chest (green red):Unremarkable epidermis, dermis and superficial subcutis.  No concerning areas for malignancy.     A/P:  1. Seborrheic keratosis, lentigo, angioma, dermal nevus  2. Basal cell carcinoma chest  It was a pleasure speaking to Oh Solares today.  Previous clinic notes and pertinent laboratory tests were reviewed prior to Oh Solares's visit.  UV precautions reviewed with patient.  Risks of non-melanoma skin cancer discussed with patient   Return to clinic 6 months  PROCEDURE NOTE  R chest basal cell carcinoma   EXCISION OF BASAL CELL CARCINOMA, Margins confirmed with FROZEN SECTIONS AND Second intent: After thorough discussion of PGACAC, consent obtained, anesthesia and prep, the margins of the lesion were identified and an incision was made encompassing the lesion with 4mm margin. The incisions were made through the skin and down to and including the superficial subcutis.  The lesion was removed en bloc and submitted for frozen section pathologic review. Clear margins obtained  (1.4cm).    REPAIR SECOND INTENT: We discussed the options for wound management in full with the patient including risks/benefits/possible outcomes. Decision made to allow the wound to heal by second intention. EBL minimal; complications none; wound care routine.  The patient was discharged in good condition and will return in one month or prn for wound evaluation.       L chest basal cell carcinoma   EXCISION OF BASAL CELL CARCINOMA, Margins confirmed with FROZEN SECTIONS AND INT: After thorough discussion of PGACAC, consent obtained, anesthesia and prep, the margins of the lesion were identified and an elliptical incision was made encompassing the lesion with 4mm margin. The incisions were made through the skin and down to and including the subcutaneous tissue. The lesion was removed en bloc and submitted for frozen section pathologic review. Clear margins obtained (2.8cm).  Beggs triangles were excised, The wound edges were undermined until adequate tissue mobility was obtained. hemostasis was achieved. The wound edges were then closed in a layered fashion, being careful not to leave any dead space. Postoperative length was 4 cm.   EBL minimal; complications none; wound care routine. The patient was from the clinic in good condition and will return in one week for wound check.

## 2022-09-27 DIAGNOSIS — F51.01 PRIMARY INSOMNIA: ICD-10-CM

## 2022-09-28 RX ORDER — ZOLPIDEM TARTRATE 10 MG/1
TABLET ORAL
Qty: 31 TABLET | Refills: 0 | Status: SHIPPED | OUTPATIENT
Start: 2022-09-28 | End: 2022-10-28

## 2022-10-03 ENCOUNTER — ALLIED HEALTH/NURSE VISIT (OUTPATIENT)
Dept: DERMATOLOGY | Facility: CLINIC | Age: 77
End: 2022-10-03
Payer: COMMERCIAL

## 2022-10-03 DIAGNOSIS — Z48.01 ENCOUNTER FOR CHANGE OR REMOVAL OF SURGICAL WOUND DRESSING: Primary | ICD-10-CM

## 2022-10-03 PROCEDURE — 99207 PR NO CHARGE NURSE ONLY: CPT

## 2022-10-03 NOTE — PROGRESS NOTES
Pt returned to clinic for post surgery 1 week follow up bandage change. Pt has no complaints, denies pain. Bandage removed from the left chest, area cleansed with normal saline. Site is healing and wound edges approximating well. Reapplied new steri strips and paper tape.    Advised to watch for signs/sx of infection; spreading redness, drainage, odor, fever. Call or report promptly to clinic. Pt given written instructions and informed to rtc as needed. Patient verbalized understanding.

## 2022-10-03 NOTE — PATIENT INSTRUCTIONS
WOUND CARE INSTRUCTIONS  for  ONE WEEK AFTER SURGERY          Leave flat bandage on your skin for one week after today s bandage change.  In one week when you remove the bandage, you may resume your regular skin care routine, including washing with mild soap and water, applying moisturizer, make-up and sunscreen.    If there are any open or bleeding areas at the incision/graft site you should begin to cover the area with a bandage daily as follows:    Clean and dry the area with plain tap water using a Q-tip or sterile gauze pad.  Apply Polysporin or Bacitracin ointment to the open area.  Cover the wound with a band-aid or a sterile non-stick gauze pad and micropore paper tape.         SIGNS OF INFECTION  - If you notice any of these signs of infection, call your doctor right away: expanding redness around the wound.  - Yellow or greenish-colored pus or cloudy wound drainage.    - Red streaking spreading from the wound.  - Increased swelling, tenderness, or pain around the wound.   - Fever.    Please remember that yellow and clear drainage from a wound can be normal and related to normal wound healing.  Isolated drainage from a wound without a combination of the above features does not indicate infection.       *Once the bandages are removed, the scar will be red and firm (especially in the lip/chin area). This is normal and will fade in time. It might take 6-12 months for this to happen.     *Massaging the area will help the scar soften and fade quicker. Begin to massage the area one month after the bandages have been removed. To massage apply pressure directly and firmly over the scar with the fingertips and move in a circular motion. Massage the area for a few minutes several times a day. Continue to massage the site for several months.    *Approximately 6-8 weeks after surgery it is not uncommon to see the formation of  tender pimple-like  bump along the scar. This is normal. As the scar continues to mature and  the stitches underneath the skin begin to dissolve, this might occur. Do not pick or squeeze, this will resolve on it s own. Should one break open producing a small amount of drainage, apply Polysporin or Bacitracin ointment a few times a day until the wound is completely healed.    *Numbness in the surgical area is expected. It might take 12-18 months for the feeling to return to normal. During this time sensations of itchiness, tingling and occasional sharp pains might be noted. These feelings are normal and will subside once the nerves have completely healed.         IN CASE OF EMERGENCY: Dr Overton [phone]     If you were seen in [place] call: [phone]    If you were seen in [place] call: [phone]

## 2022-10-04 ENCOUNTER — DOCUMENTATION ONLY (OUTPATIENT)
Dept: LAB | Facility: CLINIC | Age: 77
End: 2022-10-04

## 2022-10-04 DIAGNOSIS — D68.9 COAGULATION DEFECT (H): ICD-10-CM

## 2022-10-04 DIAGNOSIS — G89.29 CHRONIC BILATERAL LOW BACK PAIN WITHOUT SCIATICA: ICD-10-CM

## 2022-10-04 DIAGNOSIS — M54.50 CHRONIC BILATERAL LOW BACK PAIN WITHOUT SCIATICA: ICD-10-CM

## 2022-10-04 DIAGNOSIS — G89.4 CHRONIC PAIN SYNDROME: Primary | ICD-10-CM

## 2022-10-04 DIAGNOSIS — G89.4 CHRONIC PAIN SYNDROME: ICD-10-CM

## 2022-10-04 DIAGNOSIS — E78.5 HYPERLIPIDEMIA LDL GOAL <100: ICD-10-CM

## 2022-10-04 NOTE — TELEPHONE ENCOUNTER
Reason for Call:  Other prescription    Detailed comments: patient called and is out of medication Hydocoizone.    Would like to pickup tomorrow at [facility].    Please contact patient today.  Thank you.    Phone Number Patient can be reached at: Cell number on file:    Telephone Information:   Mobile [phone]       Best Time: any    Can we leave a detailed message on this number? YES    Call taken on 10/4/2022 at 2:31 PM by Mary Shields

## 2022-10-05 RX ORDER — HYDROCODONE BITARTRATE AND ACETAMINOPHEN 5; 325 MG/1; MG/1
1 TABLET ORAL EVERY 6 HOURS PRN
Qty: 30 TABLET | Refills: 0 | Status: SHIPPED | OUTPATIENT
Start: 2022-10-05 | End: 2022-11-14

## 2022-10-10 ENCOUNTER — LAB (OUTPATIENT)
Dept: LAB | Facility: CLINIC | Age: 77
End: 2022-10-10
Payer: COMMERCIAL

## 2022-10-10 DIAGNOSIS — E78.5 HYPERLIPIDEMIA LDL GOAL <100: ICD-10-CM

## 2022-10-10 DIAGNOSIS — G89.4 CHRONIC PAIN SYNDROME: ICD-10-CM

## 2022-10-10 LAB
AMPHETAMINES UR QL: NOT DETECTED
ANION GAP SERPL CALCULATED.3IONS-SCNC: 3 MMOL/L (ref 3–14)
BARBITURATES UR QL SCN: NOT DETECTED
BENZODIAZ UR QL SCN: NOT DETECTED
BUN SERPL-MCNC: 12 MG/DL (ref 7–30)
BUPRENORPHINE UR QL: NOT DETECTED
CALCIUM SERPL-MCNC: 9.1 MG/DL (ref 8.5–10.1)
CANNABINOIDS UR QL: NOT DETECTED
CHLORIDE BLD-SCNC: 106 MMOL/L (ref 94–109)
CHOLEST SERPL-MCNC: 216 MG/DL
CO2 SERPL-SCNC: 30 MMOL/L (ref 20–32)
COCAINE UR QL SCN: NOT DETECTED
CREAT SERPL-MCNC: 0.98 MG/DL (ref 0.66–1.25)
D-METHAMPHET UR QL: NOT DETECTED
FASTING STATUS PATIENT QL REPORTED: YES
GFR SERPL CREATININE-BSD FRML MDRD: 80 ML/MIN/1.73M2
GLUCOSE BLD-MCNC: 100 MG/DL (ref 70–99)
HDLC SERPL-MCNC: 84 MG/DL
LDLC SERPL CALC-MCNC: 109 MG/DL
METHADONE UR QL SCN: NOT DETECTED
NONHDLC SERPL-MCNC: 132 MG/DL
OPIATES UR QL SCN: NOT DETECTED
OXYCODONE UR QL SCN: NOT DETECTED
PCP UR QL SCN: NOT DETECTED
POTASSIUM BLD-SCNC: 4.3 MMOL/L (ref 3.4–5.3)
PROPOXYPH UR QL: NOT DETECTED
SODIUM SERPL-SCNC: 139 MMOL/L (ref 133–144)
TRICYCLICS UR QL SCN: NOT DETECTED
TRIGL SERPL-MCNC: 115 MG/DL

## 2022-10-10 PROCEDURE — 36415 COLL VENOUS BLD VENIPUNCTURE: CPT

## 2022-10-10 PROCEDURE — 80306 DRUG TEST PRSMV INSTRMNT: CPT

## 2022-10-10 PROCEDURE — 80061 LIPID PANEL: CPT

## 2022-10-10 PROCEDURE — 80048 BASIC METABOLIC PNL TOTAL CA: CPT

## 2022-10-12 DIAGNOSIS — Z79.01 LONG TERM CURRENT USE OF ANTICOAGULANT THERAPY: ICD-10-CM

## 2022-10-12 DIAGNOSIS — F41.9 ANXIETY: ICD-10-CM

## 2022-10-13 RX ORDER — ALPRAZOLAM 0.25 MG
TABLET ORAL
Qty: 30 TABLET | Refills: 0 | Status: SHIPPED | OUTPATIENT
Start: 2022-10-13 | End: 2022-11-22

## 2022-10-13 RX ORDER — WARFARIN SODIUM 5 MG/1
TABLET ORAL
Qty: 100 TABLET | Refills: 1 | Status: SHIPPED | OUTPATIENT
Start: 2022-10-13 | End: 2023-04-01

## 2022-10-17 ENCOUNTER — OFFICE VISIT (OUTPATIENT)
Dept: FAMILY MEDICINE | Facility: CLINIC | Age: 77
End: 2022-10-17
Payer: COMMERCIAL

## 2022-10-17 VITALS
DIASTOLIC BLOOD PRESSURE: 68 MMHG | WEIGHT: 146 LBS | SYSTOLIC BLOOD PRESSURE: 136 MMHG | OXYGEN SATURATION: 97 % | BODY MASS INDEX: 24.37 KG/M2 | TEMPERATURE: 97.7 F | HEART RATE: 94 BPM

## 2022-10-17 DIAGNOSIS — Z00.00 ENCOUNTER FOR MEDICARE ANNUAL WELLNESS EXAM: Primary | ICD-10-CM

## 2022-10-17 PROCEDURE — G0438 PPPS, INITIAL VISIT: HCPCS | Performed by: INTERNAL MEDICINE

## 2022-10-17 ASSESSMENT — ENCOUNTER SYMPTOMS
DIZZINESS: 0
DYSURIA: 0
FEVER: 0
HEARTBURN: 0
SORE THROAT: 0
CHILLS: 0
WEAKNESS: 0
CONSTIPATION: 0
PARESTHESIAS: 0
PALPITATIONS: 0
FREQUENCY: 0
SHORTNESS OF BREATH: 0
ARTHRALGIAS: 1
MYALGIAS: 0
EYE PAIN: 0
COUGH: 0
HEMATOCHEZIA: 0
JOINT SWELLING: 0
DIARRHEA: 0
HEADACHES: 0
ABDOMINAL PAIN: 0
NERVOUS/ANXIOUS: 1
NAUSEA: 0
HEMATURIA: 0

## 2022-10-17 ASSESSMENT — ACTIVITIES OF DAILY LIVING (ADL): CURRENT_FUNCTION: NO ASSISTANCE NEEDED

## 2022-10-17 NOTE — PATIENT INSTRUCTIONS
1. Atorvastatin 10 mg daily, reducing 10 year heart and stroke risk from 23 percent to 11 percent.       Patient Education   Personalized Prevention Plan  You are due for the preventive services outlined below.  Your care team is available to assist you in scheduling these services.  If you have already completed any of these items, please share that information with your care team to update in your medical record.  Health Maintenance Due   Topic Date Due     Hepatitis B Vaccine (1 of 3 - 3-dose series) Never done     COVID-19 Vaccine (4 - Booster for Pfizer series) 12/20/2021     Flu Vaccine (1) 09/01/2022     Annual Wellness Visit  10/04/2022       Understanding USDA MyPlate  The USDA has guidelines to help you make healthy food choices. These are called MyPlate. MyPlate shows the food groups that make up healthy meals using the image of a place setting. Before you eat, think about the healthiest choices for what to put on your plate or in your cup or bowl. To learn more about building a healthy plate, visit www.choosemyplate.gov.    The food groups    Fruits. Any fruit or 100% fruit juice counts as part of the Fruit Group. Fruits may be fresh, canned, frozen, or dried, and may be whole, cut-up, or pureed. Make 1/2 of your plate fruits and vegetables.    Vegetables. Any vegetable or 100% vegetable juice counts as a member of the Vegetable Group. Vegetables may be fresh, frozen, canned, or dried. They can be served raw or cooked and may be whole, cut-up, or mashed. Make 1/2 of your plate fruits and vegetables.    Grains. All foods made from grains are part of the Grains Group. These include wheat, rice, oats, cornmeal, and barley. Grains are often used to make foods such as bread, pasta, oatmeal, cereal, tortillas, and grits. Grains should be no more than 1/4 of your plate. At least half of your grains should be whole grains.    Protein. This group includes meat, poultry, seafood, beans and peas, eggs, processed  soy products (such as tofu), nuts (including nut butters), and seeds. Make protein choices no more than 1/4 of your plate. Meat and poultry choices should be lean or low fat.    Dairy. The Dairy Group includes all fluid milk products and foods made from milk that contain calcium, such as yogurt and cheese. (Foods that have little calcium, such as cream, butter, and cream cheese, are not part of this group.) Most dairy choices should be low-fat or fat-free.    Oils. Oils aren't a food group, but they do contain essential nutrients. However it's important to watch your intake of oils. These are fats that are liquid at room temperature. They include canola, corn, olive, soybean, vegetable, and sunflower oil. Foods that are mainly oil include mayonnaise, certain salad dressings, and soft margarines. You likely already get your daily oil allowance from the foods you eat.  Things to limit  Eating healthy also means limiting these things in your diet:       Salt (sodium). Many processed foods have a lot of sodium. To keep sodium intake down, eat fresh vegetables, meats, poultry, and seafood when possible. Purchase low-sodium, reduced-sodium, or no-salt-added food products at the store. And don't add salt to your meals at home. Instead, season them with herbs and spices such as dill, oregano, cumin, and paprika. Or try adding flavor with lemon or lime zest and juice.    Saturated fat. Saturated fats are most often found in animal products such as beef, pork, and chicken. They are often solid at room temperature, such as butter. To reduce your saturated fat intake, choose leaner cuts of meat and poultry. And try healthier cooking methods such as grilling, broiling, roasting, or baking. For a simple lower-fat swap, use plain nonfat yogurt instead of mayonnaise when making potato salad or macaroni salad.    Added sugars. These are sugars added to foods. They are in foods such as ice cream, candy, soda, fruit drinks, sports  drinks, energy drinks, cookies, pastries, jams, and syrups. Cut down on added sugars by sharing sweet treats with a family member or friend. You can also choose fruit for dessert, and drink water or other unsweetened beverages.     [employer] last reviewed this educational content on 6/1/2020 2000-2021 [employer]. All rights reserved. This information is not intended as a substitute for professional medical care. Always follow your healthcare professional's instructions.

## 2022-10-17 NOTE — PROGRESS NOTES
"SUBJECTIVE:   Oh is a 77 year old who presents for Preventive Visit.      Patient has been advised of split billing requirements and indicates understanding: Yes  Are you in the first 12 months of your Medicare coverage?  No    Healthy Habits:     In general, how would you rate your overall health?  Good    Frequency of exercise:  4-5 days/week    Duration of exercise:  30-45 minutes    Do you usually eat at least 4 servings of fruit and vegetables a day, include whole grains    & fiber and avoid regularly eating high fat or \"junk\" foods?  No    Taking medications regularly:  Yes    Medication side effects:  None    Ability to successfully perform activities of daily living:  No assistance needed    Home Safety:  No safety concerns identified    Hearing Impairment:  No hearing concerns    In the past 6 months, have you been bothered by leaking of urine?  No    In general, how would you rate your overall mental or emotional health?  Good      PHQ-2 Total Score: 0    Additional concerns today:  No    Do you feel safe in your environment? Yes  Skin cancers removed  Bowels well.  ambien for sleep   Knee doing well       Have you ever done Advance Care Planning? (For example, a Health Directive, POLST, or a discussion with a medical provider or your loved ones about your wishes): No, advance care planning information given to patient to review.  Patient plans to discuss their wishes with loved ones or provider.         Fall risk  Fallen 2 or more times in the past year?: No  Any fall with injury in the past year?: No    Cognitive Screening   1) Repeat 3 items (Leader, Season, Table)    2) Clock draw: NORMAL  3) 3 item recall: Recalls 3 objects  Results: 3 items recalled: COGNITIVE IMPAIRMENT LESS LIKELY    Mini-CogTM Copyright ELAINE Mike. Licensed by the author for use in [facility]; reprinted with permission (eamon@.[facility]). All rights reserved.      Do you have sleep apnea, excessive snoring or daytime " drowsiness?: yes    Reviewed and updated as needed this visit by clinical staff   Tobacco  Allergies  Meds  Problems  Med Hx  Surg Hx  Fam Hx  Soc   Hx        Reviewed and updated as needed this visit by Provider    Allergies  Meds  Problems            Social History     Tobacco Use     Smoking status: Former     Types: Cigarettes     Quit date: 4/15/1984     Years since quittin.5     Smokeless tobacco: Never   Substance Use Topics     Alcohol use: Yes     Comment: 1 beer every  day     If you drink alcohol do you typically have >3 drinks per day or >7 drinks per week? No    Alcohol Use 10/17/2022   Prescreen: >3 drinks/day or >7 drinks/week? No   Prescreen: >3 drinks/day or >7 drinks/week? -             Current providers sharing in care for this patient include:   Patient Care Team:  Brendon Kay MD as PCP - General  Brendon Kay MD as Assigned PCP  Lisa Marcano APRN CNP as Nurse Practitioner (Dermatology)  Lisa Marcano APRN CNP as Assigned Surgical Provider    The following health maintenance items are reviewed in Epic and correct as of today:  Health Maintenance   Topic Date Due     HEPATITIS B IMMUNIZATION (1 of 3 - 3-dose series) Never done     COVID-19 Vaccine (4 - Booster for Pfizer series) 2021     INFLUENZA VACCINE (1) 2022     MEDICARE ANNUAL WELLNESS VISIT  10/04/2022     ANNUAL REVIEW OF HM ORDERS  10/05/2023     URINE DRUG SCREEN  10/10/2023     FALL RISK ASSESSMENT  10/17/2023     LIPID  10/10/2027     ADVANCE CARE PLANNING  10/17/2027     COLORECTAL CANCER SCREENING  2030     DTAP/TDAP/TD IMMUNIZATION (5 - Td or Tdap) 2030     HEPATITIS C SCREENING  Completed     PHQ-2 (once per calendar year)  Completed     Pneumococcal Vaccine: 65+ Years  Completed     ZOSTER IMMUNIZATION  Completed     AORTIC ANEURYSM SCREENING (SYSTEM ASSIGNED)  Completed     IPV IMMUNIZATION  Aged Out     MENINGITIS IMMUNIZATION  Aged Out     Lab work is in  process  Labs reviewed in EPIC  BP Readings from Last 3 Encounters:   10/17/22 136/68   22 123/84   22 126/73    Wt Readings from Last 3 Encounters:   10/17/22 66.2 kg (146 lb)   22 68.9 kg (152 lb)   10/04/21 64.7 kg (142 lb 9.6 oz)                  Patient Active Problem List   Diagnosis     Factor V Leiden mutation (H)     GI bleed     ED (erectile dysfunction)     CARDIOVASCULAR SCREENING; LDL GOAL LESS THAN 160     Advanced directives, counseling/discussion     Sleep stage dysfunction     Anxiety     Dupuytren's contracture - left     Chronic pain     Long-term (current) use of anticoagulants [Z79.01]     Coagulation defect (HCC) [D68.9]     Chronic pain syndrome     Past Surgical History:   Procedure Laterality Date     C VENOUS THROMBOSIS STUDY       CLOSED RX ELBOW DISLOCATION  96    left     COLECTOMY       COLONOSCOPY       COLONOSCOPY WITH CO2 INSUFFLATION N/A 9/3/2014    Procedure: COLONOSCOPY WITH CO2 INSUFFLATION;  Surgeon: West Jerez MD;  Location: [facility] OR     COLONOSCOPY WITH CO2 INSUFFLATION N/A 2020    Procedure: COLONOSCOPY, WITH CO2 INSUFFLATION;  Surgeon: Jonathan Rivera MD;  Location: [facility] OR     SMALL BOWEL RESECTION      partial     VASECTOMY         Social History     Tobacco Use     Smoking status: Former     Types: Cigarettes     Quit date: 4/15/1984     Years since quittin.5     Smokeless tobacco: Never   Substance Use Topics     Alcohol use: Yes     Comment: 1 beer every  day     History reviewed. No pertinent family history.      Current Outpatient Medications   Medication Sig Dispense Refill     HYDROcodone-acetaminophen (NORCO) 5-325 MG tablet Take 1 tablet by mouth every 6 hours as needed for pain 30 tablet 0     ALPRAZolam (XANAX) 0.25 MG tablet TAKE 1 TABLET BY MOUTH THREE TIMES A DAY AS NEEDED FOR ANXIETY 30 tablet 0     bisacodyl (DULCOLAX) 5 MG EC tablet Take 1 tablet (5 mg) by mouth See Admin Instructions --Day prior to  procedure: Take 1 tablet bisacodyl at 12:00. (Patient not taking: No sig reported) 1 tablet 0     Na Sulfate-K Sulfate-Mg Sulf (SUPREP BOWEL PREP) solution Take 177 mLs (1 Bottle) by mouth See Admin Instructions -Evening before procedure complete steps 1 through 4 (see package) using (1) 6 ounce bottle before bed.  Morning of procedure, repeat steps 1 through 4 using the other 6 ounce bottle. (Patient not taking: No sig reported) 2 Bottle 0     Pediatric Multiple Vit-Vit C (VITAMIN DAILY PO) Take 500 mg by mouth daily.       polyethylene glycol (GOLYTELY) 236 g suspension Take 4,000 mLs (4 L) by mouth See Admin Instructions -Day prior to procedure: 1. Take 1 tablet bisacodyl at 12:00.  2. Mix GoLytely as directed on container.  3.  At 6:00 PM, drink an 8 oz. glass of solution and continue drinking an 8 oz. glass every 15 minutes until bottle is empty. (Patient not taking: No sig reported) 4 L 0     saw palmetto (SERENOA REPENS) 160 MG capsule Take 3 capsules by mouth daily. 1 capsule 12     TYLENOL 325 MG OR TABS as needed       warfarin ANTICOAGULANT (COUMADIN) 5 MG tablet TAKE 1/2 TAB (2.5MG) EVERY TUE AND 1 TAB (5 MG) ALL OTHER DAYS OR AS DIRECTED BY INR CLINIC 100 tablet 1     zolpidem (AMBIEN) 10 MG tablet TAKE 1 TABLET BY MOUTH EVERY DAY AT BEDTIME AS NEEDED 31 tablet 0     Allergies   Allergen Reactions     Penicillins Rash             Review of Systems   Constitutional: Negative for chills and fever.   HENT: Negative for congestion, ear pain, hearing loss and sore throat.    Eyes: Negative for pain and visual disturbance.   Respiratory: Negative for cough and shortness of breath.    Cardiovascular: Negative for chest pain, palpitations and peripheral edema.   Gastrointestinal: Negative for abdominal pain, constipation, diarrhea, heartburn, hematochezia and nausea.   Genitourinary: Positive for urgency. Negative for dysuria, frequency, genital sores, hematuria, impotence and penile discharge.  "  Musculoskeletal: Positive for arthralgias. Negative for joint swelling and myalgias.   Skin: Negative for rash.   Neurological: Negative for dizziness, weakness, headaches and paresthesias.   Psychiatric/Behavioral: Negative for mood changes. The patient is nervous/anxious.      Constitutional, HEENT, cardiovascular, pulmonary, gi and gu systems are negative, except as otherwise noted.    OBJECTIVE:   /68 (BP Location: Right arm, Patient Position: Chair, Cuff Size: Adult Regular)   Pulse 94   Temp 97.7  F (36.5  C)   Wt 66.2 kg (146 lb)   SpO2 97%   BMI 24.37 kg/m   Estimated body mass index is 24.37 kg/m  as calculated from the following:    Height as of 4/18/22: 1.648 m (5' 4.9\").    Weight as of this encounter: 66.2 kg (146 lb).  Physical Exam  GENERAL: healthy, alert and no distress  EYES: Eyes grossly normal to inspection, PERRL and conjunctivae and sclerae normal  HENT: ear canals and TM's normal, nose and mouth without ulcers or lesions  NECK: no adenopathy, no asymmetry, masses, or scars and thyroid normal to palpation  RESP: lungs clear to auscultation - no rales, rhonchi or wheezes  CV: regular rate and rhythm, normal S1 S2, no S3 or S4, no murmur, click or rub, no peripheral edema and peripheral pulses strong  ABDOMEN: soft, nontender, no hepatosplenomegaly, no masses and bowel sounds normal  MS: no gross musculoskeletal defects noted, no edema  SKIN: no suspicious lesions or rashes  NEURO: Normal strength and tone, mentation intact and speech normal  BACK: no CVA tenderness, no paralumbar tenderness  PSYCH: mentation appears normal, affect normal/bright  LYMPH: no cervical, supraclavicular, axillary, or inguinal adenopathy    Diagnostic Test Results:  Labs reviewed in Epic  No results found for any visits on 10/17/22.    ASSESSMENT / PLAN:   Oh was seen today for physical.    Diagnoses and all orders for this visit:    Encounter for Medicare annual wellness exam          We spent 10 " "minutes reviewing the current guidleines in regards to benzodiazapines, pain medications and sleeping medications   Reviewed that with age , the harmful side effects of these medications start to add up including memory loss, risk of falls   Patient requires these medications for daily functions and to stay active,   At this time, will continue to lay the ground work for titration down off medications over time     COUNSELING:  Reviewed preventive health counseling, as reflected in patient instructions       Regular exercise       Healthy diet/nutrition       Vision screening       Hearing screening       Dental care       Colon cancer screening    Estimated body mass index is 24.37 kg/m  as calculated from the following:    Height as of 4/18/22: 1.648 m (5' 4.9\").    Weight as of this encounter: 66.2 kg (146 lb).    Weight management plan: Discussed healthy diet and exercise guidelines    He reports that he quit smoking about 38 years ago. His smoking use included cigarettes. He has never used smokeless tobacco.      Appropriate preventive services were discussed with this patient, including applicable screening as appropriate for cardiovascular disease, diabetes, osteopenia/osteoporosis, and glaucoma.  As appropriate for age/gender, discussed screening for colorectal cancer, prostate cancer, breast cancer, and cervical cancer. Checklist reviewing preventive services available has been given to the patient.    Reviewed patients plan of care and provided an AVS. The Basic Care Plan (routine screening as documented in Health Maintenance) for Oh meets the Care Plan requirement. This Care Plan has been established and reviewed with the Patient.    Counseling Resources:  ATP IV Guidelines  Pooled Cohorts Equation Calculator  Breast Cancer Risk Calculator  Breast Cancer: Medication to Reduce Risk  FRAX Risk Assessment  ICSI Preventive Guidelines  Dietary Guidelines for Americans, 2010  USDA's MyPlate  ASA " Prophylaxis  Lung CA Screening    Brendon Kay MD  [facility]    Identified Health Risks:    The patient was counseled and encouraged to consider modifying their diet and eating habits. He was provided with information on recommended healthy diet options.

## 2022-10-17 NOTE — LETTER
Opioid / Opioid Plus Controlled Substance Agreement    This is an agreement between you and your provider about the safe and appropriate use of controlled substance/opioids prescribed by your care team. Controlled substances are medicines that can cause physical and mental dependence (abuse).    There are strict laws about having and using these medicines. We here at [facility] are committing to working with you in your efforts to get better. To support you in this work, we ll help you schedule regular office appointments for medicine refills. If we must cancel or change your appointment for any reason, we ll make sure you have enough medicine to last until your next appointment.     As a Provider, I will:    Listen carefully to your concerns and treat you with respect.     Recommend a treatment plan that I believe is in your best interest. This plan may involve therapies other than opioid pain medication.     Talk with you often about the possible benefits, and the risk of harm of any medicine that we prescribe for you.     Provide a plan on how to taper (discontinue or go off) using this medicine if the decision is made to stop its use.    As a Patient, I understand that opioid(s):     Are a controlled substance prescribed by my care team to help me function or work and manage my condition(s).     Are strong medicines and can cause serious side effects such as:    Drowsiness, which can seriously affect my driving ability    A lower breathing rate, enough to cause death    Harm to my thinking ability     Depression     Abuse of and addiction to this medicine    Need to be taken exactly as prescribed. Combining opioids with certain medicines or chemicals (such as illegal drugs, sedatives, sleeping pills, and benzodiazepines) can be dangerous or even fatal. If I stop opioids suddenly, I may have severe withdrawal symptoms.    Do not work for all types of pain nor for all patients. If they re not helpful, I may    be asked to stop them.    I am also being prescribed a benzodiazepine (tranquilizer) controlled substance: I understand this type of medicine is sedating and can increase the risk of death when taken together with opioids. I have talked to my care team about having prescription Narcan available for reversing the opioid medicine and have been instructed in its use. I will be very careful to take my medicines only as directed    The risks, benefits and side effects of these medicine(s) were explained to me. I agree that:  1. I will take part in other treatments as advised by my care team. This may be psychiatry or counseling, physical therapy, behavioral therapy, group treatment or a referral to a specialist.     2. I will keep all my appointments. I understand that this is part of the monitoring of opioids. My care team may require an office visit for EVERY opioid/controlled substance refill. If I miss appointments or don t follow instructions, my care team may stop my medicine.    3. I will take my medicines as prescribed. I will not change the dose or schedule unless my care team tells me to. There will be no refills if I run out early.     4. I may be asked to come to the clinic and complete a urine drug test or complete a pill count at any time. If I don t give a urine sample or participate in a pill count, the care team may stop my medicine.    5. I will only receive prescriptions from this clinic for chronic pain. If I am treated by another provider for acute pain issues, I will tell them that I am taking opioid pain medication for chronic pain and that I have a treatment agreement with this provider. I will inform my [facility] care team within one business day if I am given a prescription for any pain medication by another healthcare provider. My [facility] care team can contact other providers and pharmacists about my use of any medicines.    6. It is up to me to make sure that I don t run out  of my medicines on weekends or holidays. If my care team is willing to refill my opioid prescription without a visit, I must request refills only during office hours. Refills may take up to 3 business days to process. I will use one pharmacy to fill all my opioid and other controlled substance prescriptions. I will notify the clinic about any changes to my insurance or medication availability.    7. I am responsible for my prescriptions. If the medicine/prescription is lost, stolen or destroyed, it will not be replaced. I also agree not to share controlled substance medicines with anyone.    8. I am aware I should not use any illegal or recreational drugs. I agree not to drink alcohol unless my care team says I can.       9. If I enroll in the [place] Medical Cannabis program, I will tell my care team prior to my next refill.     10. I will tell my care team right away if I become pregnant, have a new medical problem treated outside of my regular clinic, or have a change in my medications.    11. I understand that this medicine can affect my thinking, judgment and reaction time. Alcohol and drugs affect the brain and body, which can affect the safety of my driving. Being under the influence of alcohol or drugs can affect my decision-making, behaviors, personal safety, and the safety of others. Driving while impaired (DWI) can occur if a person is driving, operating, or in physical control of a car, motorcycle, boat, snowmobile, ATV, motorbike, off-road vehicle, or any other motor vehicle ([place] Statute 169A.20). I understand the risk if I choose to drive or operate any vehicle or machinery.    I understand that if I do not follow any of the conditions above, my prescriptions or treatment may be stopped or changed.          Opioids  What You Need to Know    What are opioids?   Opioids are pain medicines that must be prescribed by a doctor. They are also known as narcotics.     Examples are:   1. morphine (MS Contin,  Isis)  2. oxycodone (Oxycontin)  3. oxycodone and acetaminophen (Percocet)  4. hydrocodone and acetaminophen (Vicodin, Norco)   5. fentanyl patch (Duragesic)   6. hydromorphone (Dilaudid)   7. methadone  8. codeine (Tylenol #3)     What do opioids do well?   Opioids are best for severe short-term pain such as after a surgery or injury. They may work well for cancer pain. They may help some people with long-lasting (chronic) pain.     What do opioids NOT do well?   Opioids never get rid of pain entirely, and they don t work well for most patients with chronic pain. Opioids don t reduce swelling, one of the causes of pain.                                    Other ways to manage chronic pain and improve function include:       Treat the health problem that may be causing pain    Anti-inflammation medicines, which reduce swelling and tenderness, such as ibuprofen (Advil, Motrin) or naproxen (Aleve)    Acetaminophen (Tylenol)    Antidepressants and anti-seizure medicines, especially for nerve pain    Topical treatments such as patches or creams    Injections or nerve blocks    Chiropractic or osteopathic treatment    Acupuncture, massage, deep breathing, meditation, visual imagery, aromatherapy    Use heat or ice at the pain site    Physical therapy     Exercise    Stop smoking    Take part in therapy       Risks and side effects     Talk to your doctor before you start or decide to keep taking opioids. Possible side effects include:      Lowering your breathing rate enough to cause death    Overdose, including death, especially if taking higher than prescribed doses    Worse depression symptoms; less pleasure in things you usually enjoy    Feeling tired or sluggish    Slower thoughts or cloudy thinking    Being more sensitive to pain over time; pain is harder to control    Trouble sleeping or restless sleep    Changes in hormone levels (for example, less testosterone)    Changes in sex drive or ability to have  [facility] EMERGENCY DEPARTMENT     Gregor Zazueta MD  05/12/20 4572     sex    Constipation    Unsafe driving    Itching and sweating    Dizziness    Nausea, throwing up and dry mouth    What else should I know about opioids?    Opioids may lead to dependence, tolerance, or addiction.      Dependence means that if you stop or reduce the medicine too quickly, you will have withdrawal symptoms. These include loose poop (diarrhea), jitters, flu-like symptoms, nervousness and tremors. Dependence is not the same as addiction.                       Tolerance means needing higher doses over time to get the same effect. This may increase the chance of serious side effects.      Addiction is when people improperly use a substance that harms their body, their mind or their relations with others. Use of opiates can cause a relapse of addiction if you have a history of drug or alcohol abuse.      People who have used opioids for a long time may have a lower quality of life, worse depression, higher levels of pain and more visits to doctors.    You can overdose on opioids. Take these steps to lower your risk of overdose:    1. Recognize the signs:  Signs of overdose include decrease or loss of consciousness (blackout), slowed breathing, trouble waking up and blue lips. If someone is worried about overdose, they should call 911.    2. Talk to your doctor about Narcan (naloxone).   If you are at risk for overdose, you may be given a prescription for Narcan. This medicine very quickly reverses the effects of opioids.   If you overdose, a friend or family member can give you Narcan while waiting for the ambulance. They need to know the signs of overdose and how to give Narcan.     3. Don't use alcohol or street drugs.   Taking them with opioids can cause death.    4. Do not take any of these medicines unless your doctor says it s OK. Taking these with opioids can cause death:    Benzodiazepines, such as lorazepam (Ativan), alprazolam (Xanax) or diazepam (Valium)    Muscle relaxers, such as  cyclobenzaprine (Flexeril)    Sleeping pills like zolpidem (Ambien)     Other opioids      How to keep you and other people safe while taking opioids:    1. Never share your opioids with others.  Opioid medicines are regulated by the Drug Enforcement Agency (LIDIA). Selling or sharing medications is a criminal act.    2. Be sure to store opioids in a secure place, locked up if possible. Young children can easily swallow them and overdose.    3. When you are traveling with your medicines, keep them in the original bottles. If you use a pill box, be sure you also carry a copy of your medicine list from your clinic or pharmacy.    4. Safe disposal of opioids    Most pharmacies have places to get rid of medicine, called disposal kiosks. Medicine disposal options are also available in every [facility]. Search your county and  medication disposal  to find more options. You can find more details at:  [email or web address]://[email or web address].[email or web address]-green/managing-[email or web address]-medications     I agree that my provider, clinic care team, and pharmacy may work with any city, state or federal law enforcement agency that investigates the misuse, sale, or other diversion of my controlled medicine. I will allow my provider to discuss my care with, or share a copy of, this agreement with any other treating provider, pharmacy or emergency room where I receive care.    I have read this agreement and have asked questions about anything I did not understand.    _______________________________________________________  Patient Signature - Oh Solares _____________________                   Date     _______________________________________________________  Provider Signature - Brendon Kay MD   _____________________                   Date     _______________________________________________________  Witness Signature (required if provider not present while patient signing)   _____________________                   Date

## 2022-10-27 DIAGNOSIS — F51.01 PRIMARY INSOMNIA: ICD-10-CM

## 2022-10-28 RX ORDER — ZOLPIDEM TARTRATE 10 MG/1
TABLET ORAL
Qty: 31 TABLET | Refills: 0 | Status: SHIPPED | OUTPATIENT
Start: 2022-10-28 | End: 2022-11-30

## 2022-11-03 ENCOUNTER — LAB (OUTPATIENT)
Dept: LAB | Facility: CLINIC | Age: 77
End: 2022-11-03
Payer: COMMERCIAL

## 2022-11-03 ENCOUNTER — ANTICOAGULATION THERAPY VISIT (OUTPATIENT)
Dept: ANTICOAGULATION | Facility: CLINIC | Age: 77
End: 2022-11-03

## 2022-11-03 DIAGNOSIS — D68.9 COAGULATION DEFECT (H): Primary | ICD-10-CM

## 2022-11-03 DIAGNOSIS — D68.51 FACTOR V LEIDEN MUTATION (H): ICD-10-CM

## 2022-11-03 DIAGNOSIS — Z79.01 LONG TERM CURRENT USE OF ANTICOAGULANT THERAPY: ICD-10-CM

## 2022-11-03 LAB — INR BLD: 3.6 (ref 0.9–1.1)

## 2022-11-03 PROCEDURE — 85610 PROTHROMBIN TIME: CPT

## 2022-11-03 PROCEDURE — 36416 COLLJ CAPILLARY BLOOD SPEC: CPT

## 2022-11-03 NOTE — PROGRESS NOTES
ANTICOAGULATION MANAGEMENT     Oh Madridkapilmeena 77 year old male is on warfarin with supratherapeutic INR result. (Goal INR 2.0-3.0)    Recent labs: (last 7 days)     11/03/22  0802   INR 3.6*       ASSESSMENT       Source(s): Chart Review and Patient/Caregiver Call       Warfarin doses taken: Warfarin taken as instructed    Diet: No new diet changes identified    New illness, injury, or hospitalization: No    Medication/supplement changes: None noted    Signs or symptoms of bleeding or clotting: No    Previous INR: Therapeutic last 2(+) visits    Additional findings: None       PLAN     Recommended plan for no diet, medication or health factor changes affecting INR     Dosing Instructions: hold dose then continue your current warfarin dose with next INR in 2 weeks       Summary  As of 11/3/2022    Full warfarin instructions:  11/3: Hold; Otherwise 2.5 mg every Mon; 5 mg all other days; Starting 11/3/2022   Next INR check:  11/17/2022             Telephone call with Oh who verbalizes understanding and agrees to plan    Lab visit scheduled    Education provided:     Goal range and lab monitoring: goal range and significance of current result    Plan made per [facility] anticoagulation protocol    Danay Pradhan RN  Anticoagulation Clinic  11/3/2022    _______________________________________________________________________     Anticoagulation Episode Summary     Current INR goal:  2.0-3.0   TTR:  77.3 % (1 y)   Target end date:  Indefinite   Send INR reminders to:  JESSICA MOY    Indications    Coagulation defect (HCC) [D68.9] [D68.9]  Factor V Leiden mutation (H) [D68.51]  Long-term (current) use of anticoagulants [Z79.01] [Z79.01]           Comments:           Anticoagulation Care Providers     Provider Role Specialty Phone number    Brendon Kay MD Referring Internal Medicine - Pediatrics [phone]    Karina Galvez PA-C Referring Family Medicine [phone]

## 2022-11-08 ENCOUNTER — TELEPHONE (OUTPATIENT)
Dept: FAMILY MEDICINE | Facility: CLINIC | Age: 77
End: 2022-11-08

## 2022-11-08 DIAGNOSIS — Z13.6 CARDIOVASCULAR SCREENING; LDL GOAL LESS THAN 160: Primary | ICD-10-CM

## 2022-11-08 NOTE — TELEPHONE ENCOUNTER
"Reached out to patient regarding below message.    He states that at last OV (10/17/22) it was mentioned by Dr. Kay that he should start cholesterol medication. However, it was not prescribed after the visit and he states he is now agreeable to it.    Per OV note:  \"Atorvastatin 10 mg daily, reducing 10 year heart and stroke risk from 23 percent to 11 percent. \"    Pended.    REJI Bloom RN  [facility] [place]  "

## 2022-11-08 NOTE — TELEPHONE ENCOUNTER
Reason for Call:  Medication or medication refill:    Do you use a [facility]?  Name of the pharmacy and phone number for the current request:  [facility]  - [place] [phone]    Name of the medication requested: Cholesterol medication mild for heart, hesitant on doing it and realizes yes he should do the following looking for the prescription      Other request: none    Can we leave a detailed message on this number? YES    Phone number patient can be reached at: Cell number on file:    Telephone Information:   Mobile [phone]       Best Time: anytime    Call taken on 11/8/2022 at 11:17 AM by Cheri Edouard

## 2022-11-09 RX ORDER — ATORVASTATIN CALCIUM 10 MG/1
10 TABLET, FILM COATED ORAL DAILY
Qty: 90 TABLET | Refills: 4 | Status: SHIPPED | OUTPATIENT
Start: 2022-11-09 | End: 2023-11-15

## 2022-11-14 DIAGNOSIS — G89.4 CHRONIC PAIN SYNDROME: ICD-10-CM

## 2022-11-14 DIAGNOSIS — G89.29 CHRONIC BILATERAL LOW BACK PAIN WITHOUT SCIATICA: ICD-10-CM

## 2022-11-14 DIAGNOSIS — M54.50 CHRONIC BILATERAL LOW BACK PAIN WITHOUT SCIATICA: ICD-10-CM

## 2022-11-14 RX ORDER — HYDROCODONE BITARTRATE AND ACETAMINOPHEN 5; 325 MG/1; MG/1
1 TABLET ORAL EVERY 6 HOURS PRN
Qty: 30 TABLET | Refills: 0 | Status: SHIPPED | OUTPATIENT
Start: 2022-11-14 | End: 2023-02-22

## 2022-11-14 NOTE — TELEPHONE ENCOUNTER
Controlled Substance Refill Request for HYDROcodone-acetaminophen (NORCO) 5-325 MG tablet    Last refill: 10/05/2022    Last clinic visit:  10/17/22    Clinic visit frequency required: Q 3 months  Next appt: none    Controlled substance agreement on file: Yes:  Date 10/17/22.    Documentation in problem list reviewed:  Yes   Chronic pain syndromeNoted 8/3/2016  [G89.4]  OverviewWritten:Sumaya Munguia MD8/3/2016 10:18 AM  Patient is followed by SUMAYA MUNGUIA for ongoing prescription of pain medication. All refills should be approved by this provider, or covering partner.    Medication(s): hydrocodone .   Maximum quantity per month: 30  Clinic visit frequency required: Q 6 months     Controlled substance agreement:  On file  Pain Clinic evaluation in the past: No    DIRE Total Score(s):  8/3/2016   Total Score 20       Last [facility] website verification: none  [email or web address]       Processing:  Rx to be electronically transmitted to pharmacy by provider    Bisi Tripp CMA

## 2022-11-14 NOTE — TELEPHONE ENCOUNTER
Reason for Call:  Other prescription    Detailed comments: patient needs a refill on his hyerocodond    Phone Number Patient can be reached at: Cell number on file:    Telephone Information:   Mobile [phone]       Best Time: anytime    Can we leave a detailed message on this number? YES    Call taken on 11/14/2022 at 12:24 PM by Irasema Sanchez

## 2022-11-16 RX ORDER — HYDROCODONE BITARTRATE AND ACETAMINOPHEN 5; 325 MG/1; MG/1
1 TABLET ORAL EVERY 6 HOURS PRN
Qty: 30 TABLET | Refills: 0 | Status: SHIPPED | OUTPATIENT
Start: 2022-11-16 | End: 2023-01-20

## 2022-11-17 ENCOUNTER — ANTICOAGULATION THERAPY VISIT (OUTPATIENT)
Dept: ANTICOAGULATION | Facility: CLINIC | Age: 77
End: 2022-11-17

## 2022-11-17 ENCOUNTER — LAB (OUTPATIENT)
Dept: LAB | Facility: CLINIC | Age: 77
End: 2022-11-17
Payer: COMMERCIAL

## 2022-11-17 DIAGNOSIS — D68.51 FACTOR V LEIDEN MUTATION (H): ICD-10-CM

## 2022-11-17 DIAGNOSIS — Z79.01 LONG TERM CURRENT USE OF ANTICOAGULANT THERAPY: ICD-10-CM

## 2022-11-17 DIAGNOSIS — D68.9 COAGULATION DEFECT (H): Primary | ICD-10-CM

## 2022-11-17 LAB — INR BLD: 2.1 (ref 0.9–1.1)

## 2022-11-17 PROCEDURE — 85610 PROTHROMBIN TIME: CPT

## 2022-11-17 PROCEDURE — 36416 COLLJ CAPILLARY BLOOD SPEC: CPT

## 2022-11-17 NOTE — PROGRESS NOTES
ANTICOAGULATION MANAGEMENT     Oh Solares 77 year old male is on warfarin with therapeutic INR result. (Goal INR 2.0-3.0)    Recent labs: (last 7 days)     11/17/22  0917   INR 2.1*       ASSESSMENT       Source(s): Chart Review and Patient/Caregiver Call       Warfarin doses taken: Warfarin taken as instructed    Diet: No new diet changes identified    New illness, injury, or hospitalization: No    Medication/supplement changes: started lipitor 11/9    Signs or symptoms of bleeding or clotting: No    Previous INR: Supratherapeutic    Additional findings: None       PLAN     Recommended plan for no diet, medication or health factor changes affecting INR     Dosing Instructions: Continue your current warfarin dose with next INR in 4 weeks       Summary  As of 11/17/2022    Full warfarin instructions:  2.5 mg every Mon; 5 mg all other days; Starting 11/17/2022   Next INR check:  12/15/2022             Telephone call with Oh who verbalizes understanding and agrees to plan    Lab visit scheduled    Education provided:     Goal range and lab monitoring: goal range and significance of current result    Plan made per [facility] anticoagulation protocol    Danay Pradhan RN  Anticoagulation Clinic  11/17/2022    _______________________________________________________________________     Anticoagulation Episode Summary     Current INR goal:  2.0-3.0   TTR:  75.8 % (1 y)   Target end date:  Indefinite   Send INR reminders to:  JESSICA MOY    Indications    Coagulation defect (HCC) [D68.9] [D68.9]  Factor V Leiden mutation (H) [D68.51]  Long-term (current) use of anticoagulants [Z79.01] [Z79.01]           Comments:           Anticoagulation Care Providers     Provider Role Specialty Phone number    Brendon Kay MD Referring Internal Medicine - Pediatrics [phone]    Karina Galvez PA-C Referring Family Medicine [phone]

## 2022-11-21 DIAGNOSIS — F41.9 ANXIETY: ICD-10-CM

## 2022-11-22 RX ORDER — ALPRAZOLAM 0.25 MG
TABLET ORAL
Qty: 30 TABLET | Refills: 0 | Status: SHIPPED | OUTPATIENT
Start: 2022-11-22 | End: 2022-12-28

## 2022-11-29 DIAGNOSIS — F51.01 PRIMARY INSOMNIA: ICD-10-CM

## 2022-11-30 RX ORDER — ZOLPIDEM TARTRATE 10 MG/1
TABLET ORAL
Qty: 31 TABLET | Refills: 0 | Status: SHIPPED | OUTPATIENT
Start: 2022-11-30 | End: 2022-12-28

## 2022-12-15 ENCOUNTER — LAB (OUTPATIENT)
Dept: LAB | Facility: CLINIC | Age: 77
End: 2022-12-15
Payer: COMMERCIAL

## 2022-12-15 ENCOUNTER — ANTICOAGULATION THERAPY VISIT (OUTPATIENT)
Dept: ANTICOAGULATION | Facility: CLINIC | Age: 77
End: 2022-12-15

## 2022-12-15 DIAGNOSIS — D68.51 FACTOR V LEIDEN MUTATION (H): ICD-10-CM

## 2022-12-15 DIAGNOSIS — Z79.01 LONG TERM CURRENT USE OF ANTICOAGULANT THERAPY: ICD-10-CM

## 2022-12-15 DIAGNOSIS — D68.9 COAGULATION DEFECT (H): Primary | ICD-10-CM

## 2022-12-15 LAB — INR BLD: 4 (ref 0.9–1.1)

## 2022-12-15 PROCEDURE — 85610 PROTHROMBIN TIME: CPT

## 2022-12-15 PROCEDURE — 36416 COLLJ CAPILLARY BLOOD SPEC: CPT

## 2022-12-15 NOTE — PROGRESS NOTES
ANTICOAGULATION MANAGEMENT     Oh Solares 77 year old male is on warfarin with supratherapeutic INR result. (Goal INR 2.0-3.0)    Recent labs: (last 7 days)     12/15/22  0912   INR 4.0*       ASSESSMENT       Source(s): Chart Review and Patient/Caregiver Call       Warfarin doses taken: Warfarin taken as instructed    Diet: No new diet changes identified    New illness, injury, or hospitalization: No    Medication/supplement changes: started lipitor no interaction anticipated    Signs or symptoms of bleeding or clotting: No    Previous INR: Therapeutic last visit; previously outside of goal range    Additional findings: None       PLAN     Recommended plan for no diet, medication or health factor changes affecting INR     Dosing Instructions: hold dose then continue your current warfarin dose with next INR in 10 days       Summary  As of 12/15/2022    Full warfarin instructions:  12/16: Hold; Otherwise 2.5 mg every Mon; 5 mg all other days; Starting 12/15/2022   Next INR check:  12/26/2022             Telephone call with Oh who verbalizes understanding and agrees to plan    Lab visit scheduled    Education provided:     Goal range and lab monitoring: goal range and significance of current result    Plan made per [facility] anticoagulation protocol    Danay Pradhan RN  Anticoagulation Clinic  12/15/2022    _______________________________________________________________________     Anticoagulation Episode Summary     Current INR goal:  2.0-3.0   TTR:  71.7 % (1 y)   Target end date:  Indefinite   Send INR reminders to:  JESSICA MOY    Indications    Coagulation defect (HCC) [D68.9] [D68.9]  Factor V Leiden mutation (H) [D68.51]  Long-term (current) use of anticoagulants [Z79.01] [Z79.01]           Comments:           Anticoagulation Care Providers     Provider Role Specialty Phone number    Brendon Kay MD Referring Internal Medicine - Pediatrics [phone]    Karina Galvez PA-C Referring  Family Medicine [phone]

## 2022-12-26 DIAGNOSIS — F51.01 PRIMARY INSOMNIA: ICD-10-CM

## 2022-12-26 DIAGNOSIS — F41.9 ANXIETY: ICD-10-CM

## 2022-12-28 RX ORDER — ZOLPIDEM TARTRATE 10 MG/1
TABLET ORAL
Qty: 31 TABLET | Refills: 0 | Status: SHIPPED | OUTPATIENT
Start: 2022-12-28 | End: 2023-01-30

## 2022-12-28 RX ORDER — ALPRAZOLAM 0.25 MG
TABLET ORAL
Qty: 30 TABLET | Refills: 0 | Status: SHIPPED | OUTPATIENT
Start: 2022-12-28 | End: 2023-01-30

## 2022-12-29 ENCOUNTER — ANTICOAGULATION THERAPY VISIT (OUTPATIENT)
Dept: ANTICOAGULATION | Facility: CLINIC | Age: 77
End: 2022-12-29

## 2022-12-29 ENCOUNTER — LAB (OUTPATIENT)
Dept: LAB | Facility: CLINIC | Age: 77
End: 2022-12-29
Payer: COMMERCIAL

## 2022-12-29 DIAGNOSIS — D68.51 FACTOR V LEIDEN MUTATION (H): ICD-10-CM

## 2022-12-29 DIAGNOSIS — D68.9 COAGULATION DEFECT (H): Primary | ICD-10-CM

## 2022-12-29 DIAGNOSIS — Z79.01 LONG TERM CURRENT USE OF ANTICOAGULANT THERAPY: ICD-10-CM

## 2022-12-29 LAB — INR BLD: 2.4 (ref 0.9–1.1)

## 2022-12-29 PROCEDURE — 36416 COLLJ CAPILLARY BLOOD SPEC: CPT

## 2022-12-29 PROCEDURE — 85610 PROTHROMBIN TIME: CPT

## 2022-12-29 NOTE — PROGRESS NOTES
ANTICOAGULATION MANAGEMENT     Oh Solares 77 year old male is on warfarin with therapeutic INR result. (Goal INR 2.0-3.0)    Recent labs: (last 7 days)     12/29/22  1124   INR 2.4*       ASSESSMENT       Source(s): Chart Review    Previous INR was Supratherapeutic    Medication, diet, health changes since last INR chart reviewed; none identified           PLAN     Unable to reach Oh today.    No instructions provided. Unable to leave voicemail.    Follow up required to confirm warfarin dose taken and assess for changes    Danay Pardhan RN  Anticoagulation Clinic  12/29/2022

## 2022-12-29 NOTE — PROGRESS NOTES
ANTICOAGULATION MANAGEMENT     Oh Solares 77 year old male is on warfarin with therapeutic INR result. (Goal INR 2.0-3.0)    Recent labs: (last 7 days)     12/29/22  1124   INR 2.4*       ASSESSMENT       Source(s): Chart Review and Patient/Caregiver Call       Warfarin doses taken: Warfarin taken as instructed    Diet: No new diet changes identified    New illness, injury, or hospitalization: No    Medication/supplement changes: None noted    Signs or symptoms of bleeding or clotting: No    Previous INR: Supratherapeutic    Additional findings: None       PLAN     Recommended plan for no diet, medication or health factor changes affecting INR     Dosing Instructions: Continue your current warfarin dose with next INR in 4 weeks       Summary  As of 12/29/2022    Full warfarin instructions:  2.5 mg every Mon; 5 mg all other days   Next INR check:  1/26/2023             Telephone call with Oh who verbalizes understanding and agrees to plan    Lab visit scheduled    Education provided:     Goal range and lab monitoring: goal range and significance of current result    Plan made per [facility] anticoagulation protocol    Danay Pradhan RN  Anticoagulation Clinic  12/29/2022    _______________________________________________________________________     Anticoagulation Episode Summary     Current INR goal:  2.0-3.0   TTR:  69.3 % (1 y)   Target end date:  Indefinite   Send INR reminders to:  JESSICA MYO    Indications    Coagulation defect (HCC) [D68.9] [D68.9]  Factor V Leiden mutation (H) [D68.51]  Long-term (current) use of anticoagulants [Z79.01] [Z79.01]           Comments:           Anticoagulation Care Providers     Provider Role Specialty Phone number    Brendon Kay MD Referring Internal Medicine - Pediatrics [phone]    Karina Galvez PA-C Referring Family Medicine [phone]           Singh see whoever he wants.

## 2023-01-20 DIAGNOSIS — M54.50 CHRONIC BILATERAL LOW BACK PAIN WITHOUT SCIATICA: ICD-10-CM

## 2023-01-20 DIAGNOSIS — G89.29 CHRONIC BILATERAL LOW BACK PAIN WITHOUT SCIATICA: ICD-10-CM

## 2023-01-20 DIAGNOSIS — G89.4 CHRONIC PAIN SYNDROME: ICD-10-CM

## 2023-01-20 RX ORDER — HYDROCODONE BITARTRATE AND ACETAMINOPHEN 5; 325 MG/1; MG/1
1 TABLET ORAL EVERY 6 HOURS PRN
Qty: 30 TABLET | Refills: 0 | Status: SHIPPED | OUTPATIENT
Start: 2023-01-20 | End: 2023-03-20

## 2023-01-20 NOTE — TELEPHONE ENCOUNTER
Reason for Call:  Medication or medication refill:    Do you use a [facility]?  Name of the pharmacy and phone number for the current request:  [facility] [id number] [place] [facility], [place] - [facility] [address]    Name of the medication requested: HYDROcodone-acetaminophen       Other request: refill request    Can we leave a detailed message on this number? YES    Phone number patient can be reached at: Home number on file [phone] (home)    Best Time: any    Call taken on 1/20/2023 at 11:08 AM by Desiree Coreas

## 2023-01-26 ENCOUNTER — ANTICOAGULATION THERAPY VISIT (OUTPATIENT)
Dept: ANTICOAGULATION | Facility: CLINIC | Age: 78
End: 2023-01-26

## 2023-01-26 ENCOUNTER — LAB (OUTPATIENT)
Dept: LAB | Facility: CLINIC | Age: 78
End: 2023-01-26
Payer: COMMERCIAL

## 2023-01-26 DIAGNOSIS — D68.51 FACTOR V LEIDEN MUTATION (H): ICD-10-CM

## 2023-01-26 DIAGNOSIS — Z79.01 LONG TERM CURRENT USE OF ANTICOAGULANT THERAPY: ICD-10-CM

## 2023-01-26 DIAGNOSIS — D68.9 COAGULATION DEFECT (H): Primary | ICD-10-CM

## 2023-01-26 LAB — INR BLD: 2.5 (ref 0.9–1.1)

## 2023-01-26 PROCEDURE — 36416 COLLJ CAPILLARY BLOOD SPEC: CPT

## 2023-01-26 PROCEDURE — 85610 PROTHROMBIN TIME: CPT

## 2023-01-26 NOTE — PROGRESS NOTES
ANTICOAGULATION MANAGEMENT     Oh Solares 77 year old male is on warfarin with therapeutic INR result. (Goal INR 2.0-3.0)    Recent labs: (last 7 days)     01/26/23  0929   INR 2.5*       ASSESSMENT       Source(s): Chart Review and Patient/Caregiver Call       Warfarin doses taken: Warfarin taken as instructed    Diet: No new diet changes identified    New illness, injury, or hospitalization: No    Medication/supplement changes: children's multi vitamin with vit K started on a month ago which may be decreasing INR today    Signs or symptoms of bleeding or clotting: No    Previous INR: Therapeutic last visit; previously outside of goal range    Additional findings: None       PLAN     Recommended plan for ongoing change(s) affecting INR     Dosing Instructions: Continue your current warfarin dose with next INR in 5 weeks       Summary  As of 1/26/2023    Full warfarin instructions:  2.5 mg every Mon; 5 mg all other days   Next INR check:  3/2/2023             Telephone call with Oh who verbalizes understanding and agrees to plan    Lab visit scheduled    Education provided:     Please call back if any changes to your diet, medications or how you've been taking warfarin    Importance of notifying anticoagulation clinic for: changes in medications; a sooner lab recheck maybe needed    Contact [phone]  with any changes, questions or concerns.     Plan made per [facility] anticoagulation protocol    Gin West RN  Anticoagulation Clinic  1/26/2023    _______________________________________________________________________     Anticoagulation Episode Summary     Current INR goal:  2.0-3.0   TTR:  69.3 % (1 y)   Target end date:  Indefinite   Send INR reminders to:  JESSICA MOY    Indications    Coagulation defect (HCC) [D68.9] [D68.9]  Factor V Leiden mutation (H) [D68.51]  Long-term (current) use of anticoagulants [Z79.01] [Z79.01]           Comments:           Anticoagulation Care Providers      Provider Role Specialty Phone number    Brendon Kay MD Referring Internal Medicine - Pediatrics [phone]    Karina Galvez PA-C Referring Family Medicine [phone]

## 2023-01-27 DIAGNOSIS — F41.9 ANXIETY: ICD-10-CM

## 2023-01-29 DIAGNOSIS — F51.01 PRIMARY INSOMNIA: ICD-10-CM

## 2023-01-30 RX ORDER — ALPRAZOLAM 0.25 MG
TABLET ORAL
Qty: 30 TABLET | Refills: 0 | Status: SHIPPED | OUTPATIENT
Start: 2023-01-30 | End: 2023-03-06

## 2023-01-30 RX ORDER — ZOLPIDEM TARTRATE 10 MG/1
TABLET ORAL
Qty: 31 TABLET | Refills: 0 | Status: SHIPPED | OUTPATIENT
Start: 2023-01-30 | End: 2023-02-22

## 2023-02-21 DIAGNOSIS — G89.4 CHRONIC PAIN SYNDROME: ICD-10-CM

## 2023-02-21 DIAGNOSIS — M54.50 CHRONIC BILATERAL LOW BACK PAIN WITHOUT SCIATICA: ICD-10-CM

## 2023-02-21 DIAGNOSIS — F51.01 PRIMARY INSOMNIA: ICD-10-CM

## 2023-02-21 DIAGNOSIS — G89.29 CHRONIC BILATERAL LOW BACK PAIN WITHOUT SCIATICA: ICD-10-CM

## 2023-02-22 RX ORDER — ZOLPIDEM TARTRATE 10 MG/1
TABLET ORAL
Qty: 31 TABLET | Refills: 0 | Status: SHIPPED | OUTPATIENT
Start: 2023-02-22 | End: 2023-04-01

## 2023-02-22 RX ORDER — HYDROCODONE BITARTRATE AND ACETAMINOPHEN 5; 325 MG/1; MG/1
1 TABLET ORAL EVERY 6 HOURS PRN
Qty: 30 TABLET | Refills: 0 | Status: SHIPPED | OUTPATIENT
Start: 2023-02-22 | End: 2023-04-28

## 2023-03-03 ENCOUNTER — ANTICOAGULATION THERAPY VISIT (OUTPATIENT)
Dept: ANTICOAGULATION | Facility: CLINIC | Age: 78
End: 2023-03-03

## 2023-03-03 ENCOUNTER — LAB (OUTPATIENT)
Dept: LAB | Facility: CLINIC | Age: 78
End: 2023-03-03
Payer: COMMERCIAL

## 2023-03-03 DIAGNOSIS — D68.51 FACTOR V LEIDEN MUTATION (H): ICD-10-CM

## 2023-03-03 DIAGNOSIS — D68.9 COAGULATION DEFECT (H): Primary | ICD-10-CM

## 2023-03-03 DIAGNOSIS — Z79.01 LONG TERM CURRENT USE OF ANTICOAGULANT THERAPY: ICD-10-CM

## 2023-03-03 LAB — INR BLD: 2.2 (ref 0.9–1.1)

## 2023-03-03 PROCEDURE — 85610 PROTHROMBIN TIME: CPT

## 2023-03-03 PROCEDURE — 36416 COLLJ CAPILLARY BLOOD SPEC: CPT

## 2023-03-03 NOTE — PROGRESS NOTES
ANTICOAGULATION MANAGEMENT     Oh Solares 78 year old male is on warfarin with therapeutic INR result. (Goal INR 2.0-3.0)    Recent labs: (last 7 days)     03/03/23  1112   INR 2.2*       ASSESSMENT       Source(s): Chart Review and Patient/Caregiver Call       Warfarin doses taken: Warfarin taken as instructed    Diet: No new diet changes identified    New illness, injury, or hospitalization: No    Medication/supplement changes: None noted    Signs or symptoms of bleeding or clotting: No    Previous INR: Therapeutic last 2(+) visits    Additional findings: None     PLAN     Recommended plan for no diet, medication or health factor changes affecting INR     Dosing Instructions: Continue your current warfarin dose with next INR in 6 weeks       Summary  As of 3/3/2023    Full warfarin instructions:  2.5 mg every Mon; 5 mg all other days   Next INR check:  4/14/2023             Telephone call with Oh who verbalizes understanding and agrees to plan    Lab visit scheduled    Education provided:     Please call back if any changes to your diet, medications or how you've been taking warfarin    Plan made per [facility] anticoagulation protocol    Jing Fermin RN  Anticoagulation Clinic  3/3/2023    _______________________________________________________________________     Anticoagulation Episode Summary     Current INR goal:  2.0-3.0   TTR:  71.3 % (1 y)   Target end date:  Indefinite   Send INR reminders to:  JESSICA MOY    Indications    Coagulation defect (HCC) [D68.9] [D68.9]  Factor V Leiden mutation (H) [D68.51]  Long-term (current) use of anticoagulants [Z79.01] [Z79.01]           Comments:           Anticoagulation Care Providers     Provider Role Specialty Phone number    Brendon Kay MD Referring Internal Medicine - Pediatrics [phone]    Karina Galvez PA-C Referring Family Medicine [phone]

## 2023-03-06 DIAGNOSIS — F41.9 ANXIETY: ICD-10-CM

## 2023-03-06 RX ORDER — ALPRAZOLAM 0.25 MG
TABLET ORAL
Qty: 30 TABLET | Refills: 0 | Status: SHIPPED | OUTPATIENT
Start: 2023-03-06 | End: 2023-04-05

## 2023-03-20 DIAGNOSIS — M54.50 CHRONIC BILATERAL LOW BACK PAIN WITHOUT SCIATICA: ICD-10-CM

## 2023-03-20 DIAGNOSIS — G89.4 CHRONIC PAIN SYNDROME: ICD-10-CM

## 2023-03-20 DIAGNOSIS — G89.29 CHRONIC BILATERAL LOW BACK PAIN WITHOUT SCIATICA: ICD-10-CM

## 2023-03-20 RX ORDER — HYDROCODONE BITARTRATE AND ACETAMINOPHEN 5; 325 MG/1; MG/1
1 TABLET ORAL EVERY 6 HOURS PRN
Qty: 30 TABLET | Refills: 0 | Status: SHIPPED | OUTPATIENT
Start: 2023-03-20 | End: 2023-05-30

## 2023-03-24 ENCOUNTER — OFFICE VISIT (OUTPATIENT)
Dept: DERMATOLOGY | Facility: CLINIC | Age: 78
End: 2023-03-24
Payer: COMMERCIAL

## 2023-03-24 DIAGNOSIS — L82.1 SEBORRHEIC KERATOSES: ICD-10-CM

## 2023-03-24 DIAGNOSIS — D18.01 CHERRY ANGIOMA: ICD-10-CM

## 2023-03-24 DIAGNOSIS — Z12.83 ENCOUNTER FOR SCREENING FOR MALIGNANT NEOPLASM OF SKIN: ICD-10-CM

## 2023-03-24 DIAGNOSIS — Z85.828 HISTORY OF BASAL CELL CARCINOMA (BCC): ICD-10-CM

## 2023-03-24 DIAGNOSIS — L81.4 LENTIGINES: ICD-10-CM

## 2023-03-24 DIAGNOSIS — D22.9 MULTIPLE BENIGN NEVI: Primary | ICD-10-CM

## 2023-03-24 PROCEDURE — 99213 OFFICE O/P EST LOW 20 MIN: CPT | Performed by: NURSE PRACTITIONER

## 2023-03-24 NOTE — LETTER
3/24/2023         RE: Oh Solares  [address] [address]        Dear Colleague,    Thank you for referring your patient, Oh Solares, to the [facility]. Please see a copy of my visit note below.    /[facility] Dermatology Note  E/*ncounter Date: Mar 24, 2023  Office Visit     Reviewed patients past medical history and pertinent chart review prior to patients visit today.     Dermatology Problem List:  History of BCC on left chest and right chest s/p excisions 9/26/22  History of actinic keratosis     Patient denies family history of skin cancer or dysplastic nevi.      ____________________________________________    Assessment & Plan:     # History of BCC on left and right chest. Well healed scars without signs of recurrence.     # Benign skin findings including: seborrheic keratoses, cherry angioma, lentigines and benign nevi.   - No further intervention required. Patient to report changes.   - Patient reassured of the benign nature of these lesions.    #Signs and Symptoms of non-melanoma skin cancer and ABCDEs of melanoma reviewed with patient. Patient encouraged to perform monthly self skin exams and educated on how to perform them. UV precautions reviewed with patient. Patient was asked about new or changing moles/lesions on body.     #Reviewed Sunscreen: Apply 20 minutes prior to going outdoors and reapply every two hours, when wet or sweating. We recommend using an SPF 30 or higher, and to use one that is water resistant.       Follow-up:  1 years for follow up full body skin exam, prn for new or changing lesions or new concerns    Lisa Marcano, APRN CNP on 3/24/2023 at 7:29 AM    ____________________________________________    CC: Skin Check (BCC x 2 removed 9/26/22 left and right chest/)    HPI:  Mr. Oh Solares is a(n) 78 year old male who presents today as a return patient for a full body skin cancer screening.  Patient has no specific concerns today.     Patient is otherwise feeling well, without additional skin concerns.     Physical Exam:  Vitals: There were no vitals taken for this visit.  SKIN: Total skin excluding the genitalia areas was performed. The exam included the head/face, neck, both arms, chest, back, abdomen, both legs, digits, mons pubis, buttock and nails.   -well healed scars on left and right chest without signs of recurrence of malignancy  -several 1-2mm red dome shaped symmetric papules scattered on the trunk  -multiple tan/brown flat round macules and raised papules scattered throughout trunk, extremities and head. No worrisome features for malignancy noted on examination.  -scattered tan, homogenous macules scattered on sun exposed areas of trunk, extremities and face.   -scattered waxy, stuck on tan/brown papules and patches on the trunk     - No other lesions of concern on areas examined.     Medications:  Current Outpatient Medications   Medication     ALPRAZolam (XANAX) 0.25 MG tablet     atorvastatin (LIPITOR) 10 MG tablet     bisacodyl (DULCOLAX) 5 MG EC tablet     HYDROcodone-acetaminophen (NORCO) 5-325 MG tablet     HYDROcodone-acetaminophen (NORCO) 5-325 MG tablet     Na Sulfate-K Sulfate-Mg Sulf (SUPREP BOWEL PREP) solution     Pediatric Multiple Vit-Vit C (VITAMIN DAILY PO)     polyethylene glycol (GOLYTELY) 236 g suspension     saw palmetto (SERENOA REPENS) 160 MG capsule     TYLENOL 325 MG OR TABS     warfarin ANTICOAGULANT (COUMADIN) 5 MG tablet     zolpidem (AMBIEN) 10 MG tablet     No current facility-administered medications for this visit.      Past Medical History:   Patient Active Problem List   Diagnosis     Factor V Leiden mutation (H)     GI bleed     ED (erectile dysfunction)     CARDIOVASCULAR SCREENING; LDL GOAL LESS THAN 160     Advanced directives, counseling/discussion     Sleep stage dysfunction     Anxiety     Dupuytren's contracture - left     Chronic pain      Long-term (current) use of anticoagulants [Z79.01]     Coagulation defect (HCC) [D68.9]     Chronic pain syndrome     Past Medical History:   Diagnosis Date     Asthma     as child     Basal cell carcinoma      ED (erectile dysfunction)      ETOH abuse      Factor V Leiden mutation (H)      Factor V Leiden mutation (H)      GI bleed      Neck pain     chiropractor treatment     Plantar fasciitis      Prostatitis      Renal dysfunction      Sleep stage dysfunction     ambien treats well     Thrombosis 01/2002       CC Referred Self, MD  No address on file on close of this encounter.      Again, thank you for allowing me to participate in the care of your patient.        Sincerely,        HOLLIS Thompson CNP

## 2023-03-24 NOTE — PROGRESS NOTES
/[facility] Dermatology Note  E/*ncounter Date: Mar 24, 2023  Office Visit     Reviewed patients past medical history and pertinent chart review prior to patients visit today.     Dermatology Problem List:  History of BCC on left chest and right chest s/p excisions 9/26/22  History of actinic keratosis     Patient denies family history of skin cancer or dysplastic nevi.      ____________________________________________    Assessment & Plan:     # History of BCC on left and right chest. Well healed scars without signs of recurrence.     # Benign skin findings including: seborrheic keratoses, cherry angioma, lentigines and benign nevi.   - No further intervention required. Patient to report changes.   - Patient reassured of the benign nature of these lesions.    #Signs and Symptoms of non-melanoma skin cancer and ABCDEs of melanoma reviewed with patient. Patient encouraged to perform monthly self skin exams and educated on how to perform them. UV precautions reviewed with patient. Patient was asked about new or changing moles/lesions on body.     #Reviewed Sunscreen: Apply 20 minutes prior to going outdoors and reapply every two hours, when wet or sweating. We recommend using an SPF 30 or higher, and to use one that is water resistant.       Follow-up:  1 years for follow up full body skin exam, prn for new or changing lesions or new concerns    Lisa Marcano, APRN CNP on 3/24/2023 at 7:29 AM    ____________________________________________    CC: Skin Check (BCC x 2 removed 9/26/22 left and right chest/)    HPI:  Mr. Oh Solares is a(n) 78 year old male who presents today as a return patient for a full body skin cancer screening. Patient has no specific concerns today.     Patient is otherwise feeling well, without additional skin concerns.     Physical Exam:  Vitals: There were no vitals taken for this visit.  SKIN: Total skin excluding the genitalia areas was performed. The exam  included the head/face, neck, both arms, chest, back, abdomen, both legs, digits, mons pubis, buttock and nails.   -well healed scars on left and right chest without signs of recurrence of malignancy  -several 1-2mm red dome shaped symmetric papules scattered on the trunk  -multiple tan/brown flat round macules and raised papules scattered throughout trunk, extremities and head. No worrisome features for malignancy noted on examination.  -scattered tan, homogenous macules scattered on sun exposed areas of trunk, extremities and face.   -scattered waxy, stuck on tan/brown papules and patches on the trunk     - No other lesions of concern on areas examined.     Medications:  Current Outpatient Medications   Medication     ALPRAZolam (XANAX) 0.25 MG tablet     atorvastatin (LIPITOR) 10 MG tablet     bisacodyl (DULCOLAX) 5 MG EC tablet     HYDROcodone-acetaminophen (NORCO) 5-325 MG tablet     HYDROcodone-acetaminophen (NORCO) 5-325 MG tablet     Na Sulfate-K Sulfate-Mg Sulf (SUPREP BOWEL PREP) solution     Pediatric Multiple Vit-Vit C (VITAMIN DAILY PO)     polyethylene glycol (GOLYTELY) 236 g suspension     saw palmetto (SERENOA REPENS) 160 MG capsule     TYLENOL 325 MG OR TABS     warfarin ANTICOAGULANT (COUMADIN) 5 MG tablet     zolpidem (AMBIEN) 10 MG tablet     No current facility-administered medications for this visit.      Past Medical History:   Patient Active Problem List   Diagnosis     Factor V Leiden mutation (H)     GI bleed     ED (erectile dysfunction)     CARDIOVASCULAR SCREENING; LDL GOAL LESS THAN 160     Advanced directives, counseling/discussion     Sleep stage dysfunction     Anxiety     Dupuytren's contracture - left     Chronic pain     Long-term (current) use of anticoagulants [Z79.01]     Coagulation defect (HCC) [D68.9]     Chronic pain syndrome     Past Medical History:   Diagnosis Date     Asthma     as child     Basal cell carcinoma      ED (erectile dysfunction)      ETOH abuse      Factor  V Leiden mutation (H)      Factor V Leiden mutation (H)      GI bleed      Neck pain     chiropractor treatment     Plantar fasciitis      Prostatitis      Renal dysfunction      Sleep stage dysfunction     ambien treats well     Thrombosis 01/2002       CC Referred Self, MD  No address on file on close of this encounter.

## 2023-03-30 DIAGNOSIS — F51.01 PRIMARY INSOMNIA: ICD-10-CM

## 2023-03-30 DIAGNOSIS — Z79.01 LONG TERM CURRENT USE OF ANTICOAGULANT THERAPY: ICD-10-CM

## 2023-04-01 RX ORDER — WARFARIN SODIUM 5 MG/1
TABLET ORAL
Qty: 100 TABLET | Refills: 1 | Status: SHIPPED | OUTPATIENT
Start: 2023-04-01 | End: 2023-10-08

## 2023-04-01 RX ORDER — ZOLPIDEM TARTRATE 10 MG/1
TABLET ORAL
Qty: 31 TABLET | Refills: 0 | Status: SHIPPED | OUTPATIENT
Start: 2023-04-01 | End: 2023-05-11

## 2023-04-05 DIAGNOSIS — F41.9 ANXIETY: ICD-10-CM

## 2023-04-05 RX ORDER — ALPRAZOLAM 0.25 MG
TABLET ORAL
Qty: 30 TABLET | Refills: 0 | Status: SHIPPED | OUTPATIENT
Start: 2023-04-05 | End: 2023-05-11

## 2023-04-14 ENCOUNTER — LAB (OUTPATIENT)
Dept: LAB | Facility: CLINIC | Age: 78
End: 2023-04-14
Payer: COMMERCIAL

## 2023-04-14 ENCOUNTER — ANTICOAGULATION THERAPY VISIT (OUTPATIENT)
Dept: ANTICOAGULATION | Facility: CLINIC | Age: 78
End: 2023-04-14

## 2023-04-14 DIAGNOSIS — D68.9 COAGULATION DEFECT (H): Primary | ICD-10-CM

## 2023-04-14 DIAGNOSIS — D68.51 FACTOR V LEIDEN MUTATION (H): ICD-10-CM

## 2023-04-14 DIAGNOSIS — Z79.01 LONG TERM CURRENT USE OF ANTICOAGULANT THERAPY: ICD-10-CM

## 2023-04-14 LAB — INR BLD: 1.8 (ref 0.9–1.1)

## 2023-04-14 PROCEDURE — 85610 PROTHROMBIN TIME: CPT

## 2023-04-14 PROCEDURE — 36416 COLLJ CAPILLARY BLOOD SPEC: CPT

## 2023-04-14 NOTE — PROGRESS NOTES
ANTICOAGULATION MANAGEMENT     Oh Solares 78 year old male is on warfarin with subtherapeutic INR result. (Goal INR 2.0-3.0)    Recent labs: (last 7 days)     04/14/23  0730   INR 1.8*       ASSESSMENT       Source(s): Chart Review and Patient/Caregiver Call       Warfarin doses taken: Missed dose(s) may be affecting INR    Diet: No new diet changes identified    New illness, injury, or hospitalization: No    Medication/supplement changes: None noted    Signs or symptoms of bleeding or clotting: No    Previous INR: Therapeutic last 2(+) visits    Additional findings: None     PLAN     Recommended plan for temporary change(s) affecting INR     Dosing Instructions: booster dose then continue your current warfarin dose with next INR in 2 weeks       Summary  As of 4/14/2023    Full warfarin instructions:  4/14: 7.5 mg; Otherwise 2.5 mg every Mon; 5 mg all other days   Next INR check:  4/28/2023             Telephone call with Oh who verbalizes understanding and agrees to plan    Lab visit scheduled    Education provided:     Please call back if any changes to your diet, medications or how you've been taking warfarin    Symptom monitoring: monitoring for clotting signs and symptoms    Plan made per [facility] anticoagulation protocol    Jing Fermin RN  Anticoagulation Clinic  4/14/2023    _______________________________________________________________________     Anticoagulation Episode Summary     Current INR goal:  2.0-3.0   TTR:  65.6 % (1 y)   Target end date:  Indefinite   Send INR reminders to:  JESSICA MOY    Indications    Coagulation defect (HCC) [D68.9] [D68.9]  Factor V Leiden mutation (H) [D68.51]  Long-term (current) use of anticoagulants [Z79.01] [Z79.01]           Comments:           Anticoagulation Care Providers     Provider Role Specialty Phone number    Brendon Kay MD Referring Internal Medicine - Pediatrics [phone]    Karina Galvez PA-C Referring Family Medicine  [phone]

## 2023-04-27 DIAGNOSIS — G89.29 CHRONIC BILATERAL LOW BACK PAIN WITHOUT SCIATICA: ICD-10-CM

## 2023-04-27 DIAGNOSIS — G89.4 CHRONIC PAIN SYNDROME: ICD-10-CM

## 2023-04-27 DIAGNOSIS — M54.50 CHRONIC BILATERAL LOW BACK PAIN WITHOUT SCIATICA: ICD-10-CM

## 2023-04-27 NOTE — TELEPHONE ENCOUNTER
Requested Prescriptions   Pending Prescriptions Disp Refills     HYDROcodone-acetaminophen (NORCO) 5-325 MG tablet 30 tablet 0     Sig: Take 1 tablet by mouth every 6 hours as needed for pain       There is no refill protocol information for this order        Brendon Barreto   [facility]

## 2023-04-28 RX ORDER — HYDROCODONE BITARTRATE AND ACETAMINOPHEN 5; 325 MG/1; MG/1
1 TABLET ORAL EVERY 6 HOURS PRN
Qty: 30 TABLET | Refills: 0 | Status: SHIPPED | OUTPATIENT
Start: 2023-04-28 | End: 2023-07-25

## 2023-05-01 ENCOUNTER — ANTICOAGULATION THERAPY VISIT (OUTPATIENT)
Dept: ANTICOAGULATION | Facility: CLINIC | Age: 78
End: 2023-05-01

## 2023-05-01 ENCOUNTER — LAB (OUTPATIENT)
Dept: LAB | Facility: CLINIC | Age: 78
End: 2023-05-01
Payer: COMMERCIAL

## 2023-05-01 DIAGNOSIS — D68.51 FACTOR V LEIDEN MUTATION (H): ICD-10-CM

## 2023-05-01 DIAGNOSIS — D68.9 COAGULATION DEFECT (H): Primary | ICD-10-CM

## 2023-05-01 DIAGNOSIS — Z79.01 LONG TERM CURRENT USE OF ANTICOAGULANT THERAPY: ICD-10-CM

## 2023-05-01 LAB — INR BLD: 2.5 (ref 0.9–1.1)

## 2023-05-01 PROCEDURE — 36416 COLLJ CAPILLARY BLOOD SPEC: CPT

## 2023-05-01 PROCEDURE — 85610 PROTHROMBIN TIME: CPT

## 2023-05-01 NOTE — PROGRESS NOTES
ANTICOAGULATION MANAGEMENT     Oh Solares 78 year old male is on warfarin with therapeutic INR result. (Goal INR 2.0-3.0)    Recent labs: (last 7 days)     05/01/23  0755   INR 2.5*       ASSESSMENT       Source(s): Chart review    Previous result: Subtherapeutic    Additional findings: None     PLAN     Recommended plan for no diet, medication or health factor changes affecting INR     Dosing Instructions: Continue your current warfarin dose with next INR in 3 weeks       Summary  As of 5/1/2023    Full warfarin instructions:  2.5 mg every Mon; 5 mg all other days   Next INR check:  5/22/2023             Detailed voice message left for Oh with dosing instructions and follow up date.     Contact [phone]  to schedule and with any changes, questions or concerns.     Education provided:     Please call back if any changes to your diet, medications or how you've been taking warfarin    Plan made per [facility] anticoagulation protocol    Jing Fermin RN  Anticoagulation Clinic  5/1/2023    _______________________________________________________________________     Anticoagulation Episode Summary     Current INR goal:  2.0-3.0   TTR:  64.3 % (1 y)   Target end date:  Indefinite   Send INR reminders to:  JESSICA MOY    Indications    Coagulation defect (HCC) [D68.9] [D68.9]  Factor V Leiden mutation (H) [D68.51]  Long-term (current) use of anticoagulants [Z79.01] [Z79.01]           Comments:           Anticoagulation Care Providers     Provider Role Specialty Phone number    Brendon Kay MD Referring Internal Medicine - Pediatrics [phone]    Karina Galvez PA-C Referring Family Medicine [phone]

## 2023-05-10 DIAGNOSIS — F41.9 ANXIETY: ICD-10-CM

## 2023-05-10 DIAGNOSIS — F51.01 PRIMARY INSOMNIA: ICD-10-CM

## 2023-05-11 RX ORDER — ZOLPIDEM TARTRATE 10 MG/1
TABLET ORAL
Qty: 31 TABLET | Refills: 0 | Status: SHIPPED | OUTPATIENT
Start: 2023-05-11 | End: 2023-06-09

## 2023-05-11 RX ORDER — ALPRAZOLAM 0.25 MG
TABLET ORAL
Qty: 30 TABLET | Refills: 0 | Status: SHIPPED | OUTPATIENT
Start: 2023-05-11 | End: 2023-06-09

## 2023-05-29 ENCOUNTER — TELEPHONE (OUTPATIENT)
Dept: FAMILY MEDICINE | Facility: CLINIC | Age: 78
End: 2023-05-29
Payer: COMMERCIAL

## 2023-05-29 DIAGNOSIS — G89.4 CHRONIC PAIN SYNDROME: ICD-10-CM

## 2023-05-29 DIAGNOSIS — M54.50 CHRONIC BILATERAL LOW BACK PAIN WITHOUT SCIATICA: ICD-10-CM

## 2023-05-29 DIAGNOSIS — G89.29 CHRONIC BILATERAL LOW BACK PAIN WITHOUT SCIATICA: ICD-10-CM

## 2023-05-29 NOTE — TELEPHONE ENCOUNTER
Medication Question or Refill        What medication are you calling about (include dose and sig)?: hydrocodone    Preferred Pharmacy:   [facility] [address] [facility] - [place] - [address]  [address]  [place] [address]  Phone: [phone] Fax: [phone]      Controlled Substance Agreement on file:   CSA -- Patient Level:     [Media Unavailable] Controlled Substance Agreement - Opioid - Scan on 10/17/2022  4:24 PM   [Media Unavailable] Controlled Substance Agreement - Opioid - Scan on 10/5/2021  6:48 AM       Who prescribed the medication?: Rahul    Do you need a refill? Yes    When did you use the medication last? na    Patient offered an appointment? No    Do you have any questions or concerns?  No      Okay to leave a detailed message?: Yes at Cell number on file:    Telephone Information:   Mobile [phone]

## 2023-05-30 RX ORDER — HYDROCODONE BITARTRATE AND ACETAMINOPHEN 5; 325 MG/1; MG/1
1 TABLET ORAL EVERY 6 HOURS PRN
Qty: 30 TABLET | Refills: 0 | Status: SHIPPED | OUTPATIENT
Start: 2023-05-30 | End: 2023-08-29

## 2023-06-05 ENCOUNTER — LAB (OUTPATIENT)
Dept: LAB | Facility: CLINIC | Age: 78
End: 2023-06-05
Payer: COMMERCIAL

## 2023-06-05 ENCOUNTER — ANTICOAGULATION THERAPY VISIT (OUTPATIENT)
Dept: ANTICOAGULATION | Facility: CLINIC | Age: 78
End: 2023-06-05

## 2023-06-05 DIAGNOSIS — Z79.01 LONG TERM CURRENT USE OF ANTICOAGULANT THERAPY: ICD-10-CM

## 2023-06-05 DIAGNOSIS — D68.51 FACTOR V LEIDEN MUTATION (H): ICD-10-CM

## 2023-06-05 DIAGNOSIS — D68.9 COAGULATION DEFECT (H): Primary | ICD-10-CM

## 2023-06-05 LAB — INR BLD: 1.8 (ref 0.9–1.1)

## 2023-06-05 PROCEDURE — 36416 COLLJ CAPILLARY BLOOD SPEC: CPT

## 2023-06-05 PROCEDURE — 85610 PROTHROMBIN TIME: CPT

## 2023-06-05 NOTE — PROGRESS NOTES
ANTICOAGULATION MANAGEMENT     Oh Solares 78 year old male is on warfarin with subtherapeutic INR result. (Goal INR 2.0-3.0)    Recent labs: (last 7 days)     06/05/23  0801   INR 1.8*       ASSESSMENT       Source(s): Chart Review and Patient/Caregiver Call       Warfarin doses taken: Missed dose(s) may be affecting INR    Diet: No new diet changes identified    Medication/supplement changes: None noted    New illness, injury, or hospitalization: No    Signs or symptoms of bleeding or clotting: No    Previous result: Therapeutic last visit; previously outside of goal range    Additional findings: None         PLAN     Recommended plan for temporary change(s) affecting INR     Dosing Instructions: booster dose then continue your current warfarin dose with next INR in 2 weeks       Summary  As of 6/5/2023    Full warfarin instructions:  6/5: 7.5 mg; Otherwise 2.5 mg every Mon; 5 mg all other days   Next INR check:  6/19/2023             Telephone call with Oh who verbalizes understanding and agrees to plan    Lab visit scheduled    Education provided:     Please call back if any changes to your diet, medications or how you've been taking warfarin    Contact [phone]  with any changes, questions or concerns.     Plan made per [facility] anticoagulation protocol    Gin West RN  Anticoagulation Clinic  6/5/2023    _______________________________________________________________________     Anticoagulation Episode Summary     Current INR goal:  2.0-3.0   TTR:  70.7 % (1 y)   Target end date:  Indefinite   Send INR reminders to:  JESSICA [facility]    Indications    Coagulation defect (HCC) [D68.9] [D68.9]  Factor V Leiden mutation (H) [D68.51]  Long-term (current) use of anticoagulants [Z79.01] [Z79.01]           Comments:           Anticoagulation Care Providers     Provider Role Specialty Phone number    Brendon Kay MD Referring Internal Medicine - Pediatrics [phone]    Karina Galvez,  [facility] [phone]

## 2023-06-08 DIAGNOSIS — F51.01 PRIMARY INSOMNIA: ICD-10-CM

## 2023-06-08 DIAGNOSIS — F41.9 ANXIETY: ICD-10-CM

## 2023-06-09 RX ORDER — ALPRAZOLAM 0.25 MG
TABLET ORAL
Qty: 30 TABLET | Refills: 0 | Status: SHIPPED | OUTPATIENT
Start: 2023-06-09 | End: 2023-07-10

## 2023-06-09 RX ORDER — ZOLPIDEM TARTRATE 10 MG/1
TABLET ORAL
Qty: 31 TABLET | Refills: 0 | Status: SHIPPED | OUTPATIENT
Start: 2023-06-09 | End: 2023-07-10

## 2023-06-19 ENCOUNTER — ANTICOAGULATION THERAPY VISIT (OUTPATIENT)
Dept: ANTICOAGULATION | Facility: CLINIC | Age: 78
End: 2023-06-19

## 2023-06-19 ENCOUNTER — LAB (OUTPATIENT)
Dept: LAB | Facility: CLINIC | Age: 78
End: 2023-06-19
Payer: COMMERCIAL

## 2023-06-19 DIAGNOSIS — Z79.01 LONG TERM CURRENT USE OF ANTICOAGULANT THERAPY: ICD-10-CM

## 2023-06-19 DIAGNOSIS — D68.51 FACTOR V LEIDEN MUTATION (H): ICD-10-CM

## 2023-06-19 DIAGNOSIS — D68.9 COAGULATION DEFECT (H): Primary | ICD-10-CM

## 2023-06-19 LAB — INR BLD: 2.3 (ref 0.9–1.1)

## 2023-06-19 PROCEDURE — 85610 PROTHROMBIN TIME: CPT

## 2023-06-19 PROCEDURE — 36416 COLLJ CAPILLARY BLOOD SPEC: CPT

## 2023-06-19 NOTE — PROGRESS NOTES
ANTICOAGULATION MANAGEMENT     Oh Solares 78 year old male is on warfarin with therapeutic INR result. (Goal INR 2.0-3.0)    Recent labs: (last 7 days)     06/19/23  1110   INR 2.3*       ASSESSMENT       Source(s): Chart Review    Previous INR was Subtherapeutic    Medication, diet, health changes since last INR chart reviewed; none identified             PLAN     Recommended plan for no diet, medication or health factor changes affecting INR     Dosing Instructions: Continue your current warfarin dose with next INR in 3 weeks       Summary  As of 6/19/2023    Full warfarin instructions:  2.5 mg every Mon; 5 mg all other days   Next INR check:  7/10/2023             Detailed voice message left for Oh with dosing instructions and follow up date.     Contact [phone]  to schedule and with any changes, questions or concerns.     Education provided:     Please call back if any changes to your diet, medications or how you've been taking warfarin    Contact [phone]  with any changes, questions or concerns.     Plan made per [facility] anticoagulation protocol    Gin West RN  Anticoagulation Clinic  6/19/2023    _______________________________________________________________________     Anticoagulation Episode Summary     Current INR goal:  2.0-3.0   TTR:  71.1 % (1 y)   Target end date:  Indefinite   Send INR reminders to:  [facility] [place]    Indications    Coagulation defect (HCC) [D68.9] [D68.9]  Factor V Leiden mutation (H) [D68.51]  Long-term (current) use of anticoagulants [Z79.01] [Z79.01]           Comments:           Anticoagulation Care Providers     Provider Role Specialty Phone number    Brendon Kay MD Referring Internal Medicine - Pediatrics [phone]    Karina Galvez PA-C Referring Family Medicine [phone]

## 2023-07-07 DIAGNOSIS — F51.01 PRIMARY INSOMNIA: ICD-10-CM

## 2023-07-07 DIAGNOSIS — F41.9 ANXIETY: ICD-10-CM

## 2023-07-10 RX ORDER — ZOLPIDEM TARTRATE 10 MG/1
TABLET ORAL
Qty: 31 TABLET | Refills: 0 | Status: SHIPPED | OUTPATIENT
Start: 2023-07-10 | End: 2023-08-14

## 2023-07-10 RX ORDER — ALPRAZOLAM 0.25 MG
TABLET ORAL
Qty: 30 TABLET | Refills: 0 | Status: SHIPPED | OUTPATIENT
Start: 2023-07-10 | End: 2023-08-14

## 2023-07-25 DIAGNOSIS — G89.4 CHRONIC PAIN SYNDROME: ICD-10-CM

## 2023-07-25 DIAGNOSIS — M54.50 CHRONIC BILATERAL LOW BACK PAIN WITHOUT SCIATICA: ICD-10-CM

## 2023-07-25 DIAGNOSIS — G89.29 CHRONIC BILATERAL LOW BACK PAIN WITHOUT SCIATICA: ICD-10-CM

## 2023-07-25 RX ORDER — HYDROCODONE BITARTRATE AND ACETAMINOPHEN 5; 325 MG/1; MG/1
1 TABLET ORAL EVERY 6 HOURS PRN
Qty: 30 TABLET | Refills: 0 | Status: SHIPPED | OUTPATIENT
Start: 2023-07-25 | End: 2023-10-31

## 2023-07-25 NOTE — TELEPHONE ENCOUNTER
Medication Question or Refill    Contacts         Type Contact Phone/Fax    07/25/2023 10:01 AM CDT Phone (Incoming) Oh Solares (Self) [phone] (H)          What medication are you calling about (include dose and sig)?: HYDROcodone-acetaminophen (NORCO) 5-325 MG table     Preferred Pharmacy:   [place] [facility] [address] [facility] - [place] - [address]  [address]  [place] [address]  Phone: [phone] Fax: [phone]  Controlled Substance Agreement on file:   CSA -- Patient Level:     [Media Unavailable] Controlled Substance Agreement - Opioid - Scan on 10/17/2022  4:24 PM   [Media Unavailable] Controlled Substance Agreement - Opioid - Scan on 10/5/2021  6:48 AM       Who prescribed the medication?: Brendon Kay  Do you need a refill? Yes  Do you have any questions or concerns?  Yes: Patient is currently out of medications.       Okay to leave a detailed message?: Yes at Home number on file [phone] (home) or Cell number on file:    Telephone Information:   Mobile [phone]

## 2023-07-31 ENCOUNTER — DOCUMENTATION ONLY (OUTPATIENT)
Dept: ANTICOAGULATION | Facility: CLINIC | Age: 78
End: 2023-07-31

## 2023-07-31 ENCOUNTER — LAB (OUTPATIENT)
Dept: LAB | Facility: CLINIC | Age: 78
End: 2023-07-31
Payer: COMMERCIAL

## 2023-07-31 ENCOUNTER — ANTICOAGULATION THERAPY VISIT (OUTPATIENT)
Dept: ANTICOAGULATION | Facility: CLINIC | Age: 78
End: 2023-07-31

## 2023-07-31 DIAGNOSIS — Z79.01 LONG TERM CURRENT USE OF ANTICOAGULANT THERAPY: ICD-10-CM

## 2023-07-31 DIAGNOSIS — D68.9 COAGULATION DEFECT (H): ICD-10-CM

## 2023-07-31 DIAGNOSIS — D68.51 FACTOR V LEIDEN MUTATION (H): ICD-10-CM

## 2023-07-31 DIAGNOSIS — D68.9 COAGULATION DEFECT (H): Primary | ICD-10-CM

## 2023-07-31 DIAGNOSIS — D68.51 FACTOR V LEIDEN MUTATION (H): Primary | ICD-10-CM

## 2023-07-31 LAB — INR BLD: 2.7 (ref 0.9–1.1)

## 2023-07-31 PROCEDURE — 85610 PROTHROMBIN TIME: CPT

## 2023-07-31 PROCEDURE — 36416 COLLJ CAPILLARY BLOOD SPEC: CPT

## 2023-07-31 NOTE — PROGRESS NOTES
Order for INR placed per protocol.    Gin Anders RN    [facility]

## 2023-07-31 NOTE — PROGRESS NOTES
ANTICOAGULATION MANAGEMENT     Oh COLE Sujatha 78 year old male is on warfarin with therapeutic INR result. (Goal INR 2.0-3.0)    Recent labs: (last 7 days)     07/31/23  1029   INR 2.7*       ASSESSMENT     Source(s): Chart Review and Patient/Caregiver Call     Warfarin doses taken: Warfarin taken as instructed  Diet: No new diet changes identified  Medication/supplement changes: None noted  New illness, injury, or hospitalization: No  Signs or symptoms of bleeding or clotting: No  Previous result: Therapeutic last 2(+) visits  Additional findings: None       PLAN     Recommended plan for no diet, medication or health factor changes affecting INR     Dosing Instructions: Continue your current warfarin dose with next INR in 4 weeks       Summary  As of 7/31/2023      Full warfarin instructions:  2.5 mg every Mon; 5 mg all other days   Next INR check:  9/11/2023               Telephone call with Oh who verbalizes understanding and agrees to plan    Lab visit scheduled Oh aware of protocol for 4 weeks, chooses to schedule out to 6 weeks      Education provided:   Please call back if any changes to your diet, medications or how you've been taking warfarin  Goal range and lab monitoring: goal range and significance of current result, Importance of therapeutic range, and Importance of following up at instructed interval    Plan made per [facility] anticoagulation protocol    Kristen Velazquez RN  Anticoagulation Clinic  7/31/2023    _______________________________________________________________________     Anticoagulation Episode Summary       Current INR goal:  2.0-3.0   TTR:  75.3 % (1 y)   Target end date:  Indefinite   Send INR reminders to:  JESSICA [facility]    Indications    Coagulation defect (HCC) [D68.9] [D68.9]  Factor V Leiden mutation (H) [D68.51]  Long-term (current) use of anticoagulants [Z79.01] [Z79.01]             Comments:               Anticoagulation Care Providers       Provider Role Specialty Phone  number    Brendon Kay MD Referring Internal Medicine - Pediatrics [phone]    Karina Galvez PA-C Referring Family Medicine [phone]

## 2023-08-11 DIAGNOSIS — F51.01 PRIMARY INSOMNIA: ICD-10-CM

## 2023-08-11 DIAGNOSIS — F41.9 ANXIETY: ICD-10-CM

## 2023-08-14 ENCOUNTER — DOCUMENTATION ONLY (OUTPATIENT)
Dept: ANTICOAGULATION | Facility: CLINIC | Age: 78
End: 2023-08-14
Payer: COMMERCIAL

## 2023-08-14 DIAGNOSIS — D68.9 COAGULATION DEFECT (H): Primary | ICD-10-CM

## 2023-08-14 DIAGNOSIS — D68.51 FACTOR V LEIDEN MUTATION (H): ICD-10-CM

## 2023-08-14 RX ORDER — ZOLPIDEM TARTRATE 10 MG/1
TABLET ORAL
Qty: 31 TABLET | Refills: 0 | Status: SHIPPED | OUTPATIENT
Start: 2023-08-14 | End: 2023-09-13

## 2023-08-14 RX ORDER — ALPRAZOLAM 0.25 MG
TABLET ORAL
Qty: 30 TABLET | Refills: 0 | Status: SHIPPED | OUTPATIENT
Start: 2023-08-14 | End: 2023-09-13

## 2023-08-14 NOTE — PROGRESS NOTES
ANTICOAGULATION CLINIC REFERRAL RENEWAL REQUEST       An annual renewal order is required for all patients referred to [facility].?  Please review and sign the pended referral order for Oh Solares.       ANTICOAGULATION SUMMARY      Warfarin indication(s)   Heterozygous Factor V Leiden    Mechanical heart valve present?  NO       Current goal range   INR: 2.0-3.0     Goal appropriate for indication? Goal INR 2-3, standard for indication(s) above     Time in Therapeutic Range (TTR)  (Goal > 60%) 75.3%       Office visit with referring provider's group within last year yes on 10/17/2022       Jaymie Lemon, RN  [facility]

## 2023-08-29 DIAGNOSIS — M54.50 CHRONIC BILATERAL LOW BACK PAIN WITHOUT SCIATICA: ICD-10-CM

## 2023-08-29 DIAGNOSIS — G89.29 CHRONIC BILATERAL LOW BACK PAIN WITHOUT SCIATICA: ICD-10-CM

## 2023-08-29 DIAGNOSIS — G89.4 CHRONIC PAIN SYNDROME: ICD-10-CM

## 2023-08-30 RX ORDER — HYDROCODONE BITARTRATE AND ACETAMINOPHEN 5; 325 MG/1; MG/1
1 TABLET ORAL EVERY 6 HOURS PRN
Qty: 30 TABLET | Refills: 0 | Status: SHIPPED | OUTPATIENT
Start: 2023-08-30 | End: 2023-11-20

## 2023-09-13 DIAGNOSIS — F41.9 ANXIETY: ICD-10-CM

## 2023-09-13 DIAGNOSIS — F51.01 PRIMARY INSOMNIA: ICD-10-CM

## 2023-09-13 RX ORDER — ZOLPIDEM TARTRATE 10 MG/1
TABLET ORAL
Qty: 31 TABLET | Refills: 0 | Status: SHIPPED | OUTPATIENT
Start: 2023-09-13 | End: 2023-10-08

## 2023-09-13 RX ORDER — ALPRAZOLAM 0.25 MG
TABLET ORAL
Qty: 30 TABLET | Refills: 0 | Status: SHIPPED | OUTPATIENT
Start: 2023-09-13 | End: 2023-10-08

## 2023-09-14 ENCOUNTER — LAB (OUTPATIENT)
Dept: LAB | Facility: CLINIC | Age: 78
End: 2023-09-14
Payer: COMMERCIAL

## 2023-09-14 ENCOUNTER — ANTICOAGULATION THERAPY VISIT (OUTPATIENT)
Dept: ANTICOAGULATION | Facility: CLINIC | Age: 78
End: 2023-09-14

## 2023-09-14 DIAGNOSIS — D68.51 FACTOR V LEIDEN MUTATION (H): ICD-10-CM

## 2023-09-14 DIAGNOSIS — Z79.01 LONG TERM CURRENT USE OF ANTICOAGULANT THERAPY: ICD-10-CM

## 2023-09-14 DIAGNOSIS — D68.9 COAGULATION DEFECT (H): ICD-10-CM

## 2023-09-14 DIAGNOSIS — D68.9 COAGULATION DEFECT (H): Primary | ICD-10-CM

## 2023-09-14 LAB — INR BLD: 2.5 (ref 0.9–1.1)

## 2023-09-14 PROCEDURE — 85610 PROTHROMBIN TIME: CPT

## 2023-09-14 PROCEDURE — 36416 COLLJ CAPILLARY BLOOD SPEC: CPT

## 2023-09-14 NOTE — PROGRESS NOTES
ANTICOAGULATION MANAGEMENT     Oh Solares 78 year old male is on warfarin with therapeutic INR result. (Goal INR 2.0-3.0)    Recent labs: (last 7 days)     09/14/23  0712   INR 2.5*       ASSESSMENT     Source(s): Chart Review     Warfarin doses taken: Warfarin taken as instructed  Diet: No new diet changes identified  Medication/supplement changes: None noted  New illness, injury, or hospitalization: No  Signs or symptoms of bleeding or clotting: No  Previous result: Therapeutic last 2(+) visits  Additional findings: None       PLAN     Recommended plan for no diet, medication or health factor changes affecting INR     Dosing Instructions: Continue your current warfarin dose with next INR in 6 weeks       Summary  As of 9/14/2023      Full warfarin instructions:  2.5 mg every Mon; 5 mg all other days   Next INR check:  10/23/2023               Telephone call with Oh who verbalizes understanding and agrees to plan    Lab visit scheduled    Education provided:   Please call back if any changes to your diet, medications or how you've been taking warfarin  Contact [phone]  with any changes, questions or concerns.     Plan made per [facility] anticoagulation protocol    Gin West RN  Anticoagulation Clinic  9/14/2023    _______________________________________________________________________     Anticoagulation Episode Summary       Current INR goal:  2.0-3.0   TTR:  76.4 % (1 y)   Target end date:  Indefinite   Send INR reminders to:  JESSICA [facility]    Indications    Coagulation defect (HCC) [D68.9] [D68.9]  Factor V Leiden mutation (H) [D68.51]  Long-term (current) use of anticoagulants [Z79.01] [Z79.01]             Comments:               Anticoagulation Care Providers       Provider Role Specialty Phone number    Brendon Kay MD Referring Internal Medicine - Pediatrics [phone]    Karina Galvez PA-C Referring Family Medicine [phone]

## 2023-09-18 ENCOUNTER — PATIENT OUTREACH (OUTPATIENT)
Dept: CARE COORDINATION | Facility: CLINIC | Age: 78
End: 2023-09-18
Payer: COMMERCIAL

## 2023-10-08 DIAGNOSIS — F51.01 PRIMARY INSOMNIA: ICD-10-CM

## 2023-10-08 DIAGNOSIS — Z79.01 LONG TERM CURRENT USE OF ANTICOAGULANT THERAPY: ICD-10-CM

## 2023-10-08 DIAGNOSIS — F41.9 ANXIETY: ICD-10-CM

## 2023-10-08 RX ORDER — WARFARIN SODIUM 5 MG/1
TABLET ORAL
Qty: 82 TABLET | Refills: 2 | Status: SHIPPED | OUTPATIENT
Start: 2023-10-08 | End: 2024-04-30

## 2023-10-08 RX ORDER — ALPRAZOLAM 0.25 MG
TABLET ORAL
Qty: 30 TABLET | Refills: 0 | Status: SHIPPED | OUTPATIENT
Start: 2023-10-08 | End: 2023-11-15

## 2023-10-08 RX ORDER — ZOLPIDEM TARTRATE 10 MG/1
TABLET ORAL
Qty: 31 TABLET | Refills: 0 | Status: SHIPPED | OUTPATIENT
Start: 2023-10-08 | End: 2023-11-27

## 2023-10-31 ENCOUNTER — TELEPHONE (OUTPATIENT)
Dept: FAMILY MEDICINE | Facility: CLINIC | Age: 78
End: 2023-10-31
Payer: COMMERCIAL

## 2023-10-31 DIAGNOSIS — M54.50 CHRONIC BILATERAL LOW BACK PAIN WITHOUT SCIATICA: ICD-10-CM

## 2023-10-31 DIAGNOSIS — G89.4 CHRONIC PAIN SYNDROME: ICD-10-CM

## 2023-10-31 DIAGNOSIS — G89.29 CHRONIC BILATERAL LOW BACK PAIN WITHOUT SCIATICA: ICD-10-CM

## 2023-10-31 RX ORDER — HYDROCODONE BITARTRATE AND ACETAMINOPHEN 5; 325 MG/1; MG/1
1 TABLET ORAL EVERY 6 HOURS PRN
Qty: 30 TABLET | Refills: 0 | Status: SHIPPED | OUTPATIENT
Start: 2023-10-31 | End: 2024-01-02

## 2023-10-31 NOTE — TELEPHONE ENCOUNTER
Patient calling to request refill on norco    Prescription and pharmacy cue'd up  Patient has future appointment with PCP on 11/20/23    Lesley Wilde RN  [facility]

## 2023-11-13 ENCOUNTER — LAB (OUTPATIENT)
Dept: LAB | Facility: CLINIC | Age: 78
End: 2023-11-13
Payer: COMMERCIAL

## 2023-11-13 ENCOUNTER — DOCUMENTATION ONLY (OUTPATIENT)
Dept: FAMILY MEDICINE | Facility: CLINIC | Age: 78
End: 2023-11-13

## 2023-11-13 ENCOUNTER — TELEPHONE (OUTPATIENT)
Dept: FAMILY MEDICINE | Facility: CLINIC | Age: 78
End: 2023-11-13

## 2023-11-13 ENCOUNTER — ANTICOAGULATION THERAPY VISIT (OUTPATIENT)
Dept: ANTICOAGULATION | Facility: CLINIC | Age: 78
End: 2023-11-13

## 2023-11-13 DIAGNOSIS — E55.9 VITAMIN D DEFICIENCY: Primary | ICD-10-CM

## 2023-11-13 DIAGNOSIS — Z13.6 CARDIOVASCULAR SCREENING; LDL GOAL LESS THAN 160: ICD-10-CM

## 2023-11-13 DIAGNOSIS — D68.51 FACTOR V LEIDEN MUTATION (H): ICD-10-CM

## 2023-11-13 DIAGNOSIS — Z79.01 LONG TERM CURRENT USE OF ANTICOAGULANT THERAPY: ICD-10-CM

## 2023-11-13 DIAGNOSIS — D68.9 COAGULATION DEFECT (H): ICD-10-CM

## 2023-11-13 DIAGNOSIS — D68.9 COAGULATION DEFECT (H): Primary | ICD-10-CM

## 2023-11-13 DIAGNOSIS — E55.9 VITAMIN D DEFICIENCY: ICD-10-CM

## 2023-11-13 DIAGNOSIS — K25.4 GASTROINTESTINAL HEMORRHAGE ASSOCIATED WITH GASTRIC ULCER: ICD-10-CM

## 2023-11-13 LAB
BASOPHILS # BLD AUTO: 0 10E3/UL (ref 0–0.2)
BASOPHILS NFR BLD AUTO: 1 %
EOSINOPHIL # BLD AUTO: 0.5 10E3/UL (ref 0–0.7)
EOSINOPHIL NFR BLD AUTO: 6 %
ERYTHROCYTE [DISTWIDTH] IN BLOOD BY AUTOMATED COUNT: 13.5 % (ref 10–15)
HCT VFR BLD AUTO: 45.7 % (ref 40–53)
HGB BLD-MCNC: 14.4 G/DL (ref 13.3–17.7)
IMM GRANULOCYTES # BLD: 0 10E3/UL
IMM GRANULOCYTES NFR BLD: 0 %
INR BLD: 1.9 (ref 0.9–1.1)
LYMPHOCYTES # BLD AUTO: 1.5 10E3/UL (ref 0.8–5.3)
LYMPHOCYTES NFR BLD AUTO: 20 %
MCH RBC QN AUTO: 29.5 PG (ref 26.5–33)
MCHC RBC AUTO-ENTMCNC: 31.5 G/DL (ref 31.5–36.5)
MCV RBC AUTO: 94 FL (ref 78–100)
MONOCYTES # BLD AUTO: 0.8 10E3/UL (ref 0–1.3)
MONOCYTES NFR BLD AUTO: 10 %
NEUTROPHILS # BLD AUTO: 4.7 10E3/UL (ref 1.6–8.3)
NEUTROPHILS NFR BLD AUTO: 63 %
PLATELET # BLD AUTO: 223 10E3/UL (ref 150–450)
RBC # BLD AUTO: 4.88 10E6/UL (ref 4.4–5.9)
WBC # BLD AUTO: 7.6 10E3/UL (ref 4–11)

## 2023-11-13 PROCEDURE — 80061 LIPID PANEL: CPT

## 2023-11-13 PROCEDURE — 85025 COMPLETE CBC W/AUTO DIFF WBC: CPT

## 2023-11-13 PROCEDURE — 80053 COMPREHEN METABOLIC PANEL: CPT

## 2023-11-13 PROCEDURE — 82306 VITAMIN D 25 HYDROXY: CPT

## 2023-11-13 PROCEDURE — 85610 PROTHROMBIN TIME: CPT

## 2023-11-13 PROCEDURE — 36415 COLL VENOUS BLD VENIPUNCTURE: CPT

## 2023-11-13 NOTE — TELEPHONE ENCOUNTER
Patient has annual visit next week; had labs drawn for INR today; lab collected extra tubes    Need orders placed     .Gin De León RN  [facility]

## 2023-11-13 NOTE — PROGRESS NOTES
ANTICOAGULATION MANAGEMENT     Oh Solares 78 year old male is on warfarin with subtherapeutic INR result. (Goal INR 2.0-3.0)    Recent labs: (last 7 days)     11/13/23  1057   INR 1.9*       ASSESSMENT     Source(s): Chart Review and Patient/Caregiver Call     Warfarin doses taken: Warfarin taken as instructed  Diet: Increased greens/vitamin K in diet; plans to resume previous intake  Medication/supplement changes: None noted  New illness, injury, or hospitalization: No  Signs or symptoms of bleeding or clotting: No  Previous result: Therapeutic last 2(+) visits  Additional findings: None       PLAN     Recommended plan for no diet, medication or health factor changes affecting INR     Dosing Instructions: Continue your current warfarin dose with next INR in 2 weeks       Summary  As of 11/13/2023      Full warfarin instructions:  2.5 mg every Mon; 5 mg all other days   Next INR check:  12/4/2023               Telephone call with Oh who verbalizes understanding and agrees to plan    Patient elected to schedule next visit in 3 weeks    Education provided:   Dietary considerations: vitamin K content of foods and importance of notifying [facility] to changes in diet  Symptom monitoring: monitoring for clotting signs and symptoms  Contact [phone]  with any changes, questions or concerns.     Plan made per [facility] anticoagulation protocol    Jaymie Lemon RN  Anticoagulation Clinic  11/13/2023    _______________________________________________________________________     Anticoagulation Episode Summary       Current INR goal:  2.0-3.0   TTR:  79.5% (1 y)   Target end date:  Indefinite   Send INR reminders to:  JESSICA MOY    Indications    Coagulation defect (HCC) [D68.9] [D68.9]  Factor V Leiden mutation (H24) [D68.51]  Long-term (current) use of anticoagulants [Z79.01] [Z79.01]             Comments:               Anticoagulation Care Providers       Provider Role Specialty Phone number    Rahul  MD Brendon Referring Internal Medicine - Pediatrics [phone]    Karina Galvez PA-C Referring Family Medicine [phone]

## 2023-11-14 DIAGNOSIS — Z13.6 CARDIOVASCULAR SCREENING; LDL GOAL LESS THAN 160: ICD-10-CM

## 2023-11-14 DIAGNOSIS — F41.9 ANXIETY: ICD-10-CM

## 2023-11-14 LAB
ALBUMIN SERPL BCG-MCNC: 4.3 G/DL (ref 3.5–5.2)
ALP SERPL-CCNC: 64 U/L (ref 40–129)
ALT SERPL W P-5'-P-CCNC: 23 U/L (ref 0–70)
ANION GAP SERPL CALCULATED.3IONS-SCNC: 9 MMOL/L (ref 7–15)
AST SERPL W P-5'-P-CCNC: 29 U/L (ref 0–45)
BILIRUB SERPL-MCNC: 0.4 MG/DL
BUN SERPL-MCNC: 13.8 MG/DL (ref 8–23)
CALCIUM SERPL-MCNC: 8.9 MG/DL (ref 8.8–10.2)
CHLORIDE SERPL-SCNC: 104 MMOL/L (ref 98–107)
CHOLEST SERPL-MCNC: 170 MG/DL
CREAT SERPL-MCNC: 0.95 MG/DL (ref 0.67–1.17)
DEPRECATED HCO3 PLAS-SCNC: 26 MMOL/L (ref 22–29)
EGFRCR SERPLBLD CKD-EPI 2021: 83 ML/MIN/1.73M2
GLUCOSE SERPL-MCNC: 98 MG/DL (ref 70–99)
HDLC SERPL-MCNC: 87 MG/DL
LDLC SERPL CALC-MCNC: 67 MG/DL
NONHDLC SERPL-MCNC: 83 MG/DL
POTASSIUM SERPL-SCNC: 4.8 MMOL/L (ref 3.4–5.3)
PROT SERPL-MCNC: 6.8 G/DL (ref 6.4–8.3)
SODIUM SERPL-SCNC: 139 MMOL/L (ref 135–145)
TRIGL SERPL-MCNC: 82 MG/DL
VIT D+METAB SERPL-MCNC: 38 NG/ML (ref 20–50)

## 2023-11-15 RX ORDER — ATORVASTATIN CALCIUM 10 MG/1
10 TABLET, FILM COATED ORAL DAILY
Qty: 90 TABLET | Refills: 4 | Status: SHIPPED | OUTPATIENT
Start: 2023-11-15

## 2023-11-15 RX ORDER — ALPRAZOLAM 0.25 MG
TABLET ORAL
Qty: 30 TABLET | Refills: 0 | Status: SHIPPED | OUTPATIENT
Start: 2023-11-15 | End: 2023-12-06

## 2023-11-20 ENCOUNTER — OFFICE VISIT (OUTPATIENT)
Dept: FAMILY MEDICINE | Facility: CLINIC | Age: 78
End: 2023-11-20
Payer: COMMERCIAL

## 2023-11-20 VITALS
WEIGHT: 147 LBS | HEIGHT: 65 IN | TEMPERATURE: 97.9 F | OXYGEN SATURATION: 99 % | BODY MASS INDEX: 24.49 KG/M2 | SYSTOLIC BLOOD PRESSURE: 136 MMHG | RESPIRATION RATE: 18 BRPM | DIASTOLIC BLOOD PRESSURE: 78 MMHG | HEART RATE: 65 BPM

## 2023-11-20 DIAGNOSIS — D68.9 COAGULATION DEFECT (H): ICD-10-CM

## 2023-11-20 DIAGNOSIS — Z29.11 NEED FOR VACCINATION AGAINST RESPIRATORY SYNCYTIAL VIRUS: ICD-10-CM

## 2023-11-20 DIAGNOSIS — G89.4 CHRONIC PAIN SYNDROME: Primary | ICD-10-CM

## 2023-11-20 LAB — CREAT UR-MCNC: 156 MG/DL

## 2023-11-20 PROCEDURE — G0480 DRUG TEST DEF 1-7 CLASSES: HCPCS | Performed by: INTERNAL MEDICINE

## 2023-11-20 PROCEDURE — G0439 PPPS, SUBSEQ VISIT: HCPCS | Performed by: INTERNAL MEDICINE

## 2023-11-20 RX ORDER — RESPIRATORY SYNCYTIAL VIRUS VACCINE 120MCG/0.5
0.5 KIT INTRAMUSCULAR ONCE
Qty: 1 EACH | Refills: 0 | Status: SHIPPED | OUTPATIENT
Start: 2023-11-20 | End: 2023-11-20

## 2023-11-20 ASSESSMENT — ENCOUNTER SYMPTOMS
ABDOMINAL PAIN: 0
HEMATOCHEZIA: 0
CONSTIPATION: 0
HEMATURIA: 0
COUGH: 0
CHILLS: 0

## 2023-11-20 ASSESSMENT — ACTIVITIES OF DAILY LIVING (ADL): CURRENT_FUNCTION: NO ASSISTANCE NEEDED

## 2023-11-20 NOTE — PROGRESS NOTES
"SUBJECTIVE:   Oh is a 78 year old, presenting for the following:  Physical        11/20/2023    10:41 AM   Additional Questions   Roomed by Marycruz       Are you in the first 12 months of your Medicare coverage?  No    Healthy Habits:     In general, how would you rate your overall health?  Excellent    Frequency of exercise:  2-3 days/week    Duration of exercise:  30-45 minutes    Do you usually eat at least 4 servings of fruit and vegetables a day, include whole grains    & fiber and avoid regularly eating high fat or \"junk\" foods?  No    Taking medications regularly:  Yes    Barriers to taking medications:  None    Medication side effects:  None    Ability to successfully perform activities of daily living:  No assistance needed    Home Safety:  No safety concerns identified    Hearing Impairment:  No hearing concerns    In the past 6 months, have you been bothered by leaking of urine?  No    In general, how would you rate your overall mental or emotional health?  Excellent    Additional concerns today:  Yes    -Bump on top of head  -Heel check on right foot  Inr ( had lower test after brocolli     [place] February     Today's PHQ-2 Score:       11/20/2023    10:38 AM   PHQ-2 ( 1999 [employer])   Q1: Little interest or pleasure in doing things 0   Q2: Feeling down, depressed or hopeless 0   PHQ-2 Score 0   Q1: Little interest or pleasure in doing things Not at all   Q2: Feeling down, depressed or hopeless Not at all   PHQ-2 Score 0           Have you ever done Advance Care Planning? (For example, a Health Directive, POLST, or a discussion with a medical provider or your loved ones about your wishes): No, advance care planning information given to patient to review.  Patient plans to discuss their wishes with loved ones or provider.         Fall risk  Fallen 2 or more times in the past year?: No  Any fall with injury in the past year?: No    Cognitive Screening   1) Repeat 3 items (Leader, Season, Table)    2) Clock " draw: NORMAL  3) 3 item recall: Recalls 3 objects  Results: 3 items recalled: COGNITIVE IMPAIRMENT LESS LIKELY    Mini-CogTM Copyright S Cee. Licensed by the author for use in [facility]; reprinted with permission (eamon@[facility]). All rights reserved.      Do you have sleep apnea, excessive snoring or daytime drowsiness? : no    Reviewed and updated as needed this visit by clinical staff   Tobacco  Allergies  Meds              Reviewed and updated as needed this visit by Provider                 Social History     Tobacco Use     Smoking status: Former     Types: Cigarettes     Quit date: 4/15/1984     Years since quittin.6     Passive exposure: Past     Smokeless tobacco: Never   Substance Use Topics     Alcohol use: Yes     Comment: 1 beer every  day             2023    10:37 AM   Alcohol Use   Prescreen: >3 drinks/day or >7 drinks/week? No     Do you have a current opioid prescription? No  Do you use any other controlled substances or medications that are not prescribed by a provider? None              Current providers sharing in care for this patient include:   Patient Care Team:  Brendon Kay MD as PCP - General  Brendon Kay MD as Assigned PCP  Lisa Marcano APRN CNP as Nurse Practitioner (Dermatology)  Brendon Kay MD as Assigned Pain Medication Provider  Lisa Marcano APRN CNP as Assigned Surgical Provider    The following health maintenance items are reviewed in Epic and correct as of today:  Health Maintenance   Topic Date Due     RSV VACCINE (Pregnancy & 60+) (1 - 1-dose 60+ series) Never done     URINE DRUG SCREEN  10/10/2023     MEDICARE ANNUAL WELLNESS VISIT  10/17/2023     ANNUAL REVIEW OF HM ORDERS  2024     FALL RISK ASSESSMENT  2024     COLORECTAL CANCER SCREENING  2025     LIPID  2028     ADVANCE CARE PLANNING  2028     DTAP/TDAP/TD IMMUNIZATION (4 - Td or Tdap) 2030     HEPATITIS C SCREENING  Completed      PHQ-2 (once per calendar year)  Completed     INFLUENZA VACCINE  Completed     Pneumococcal Vaccine: 65+ Years  Completed     ZOSTER IMMUNIZATION  Completed     COVID-19 Vaccine  Completed     IPV IMMUNIZATION  Aged Out     HPV IMMUNIZATION  Aged Out     MENINGITIS IMMUNIZATION  Aged Out     RSV MONOCLONAL ANTIBODY  Aged Out     Lab work is in process  Labs reviewed in EPIC  BP Readings from Last 3 Encounters:   23 136/78   10/17/22 136/68   22 123/84    Wt Readings from Last 3 Encounters:   23 66.7 kg (147 lb)   10/17/22 66.2 kg (146 lb)   22 68.9 kg (152 lb)                  Patient Active Problem List   Diagnosis     Factor V Leiden mutation (H24)     GI bleed     ED (erectile dysfunction)     CARDIOVASCULAR SCREENING; LDL GOAL LESS THAN 160     Advanced directives, counseling/discussion     Sleep stage dysfunction     Anxiety     Dupuytren's contracture - left     Chronic pain     Long-term (current) use of anticoagulants [Z79.01]     Coagulation defect (HCC) [D68.9]     Chronic pain syndrome     Past Surgical History:   Procedure Laterality Date     C VENOUS THROMBOSIS STUDY       CLOSED RX ELBOW DISLOCATION  96    left     COLECTOMY  2002     COLONOSCOPY       COLONOSCOPY WITH CO2 INSUFFLATION N/A 9/3/2014    Procedure: COLONOSCOPY WITH CO2 INSUFFLATION;  Surgeon: West Jerez MD;  Location: [facility] OR     COLONOSCOPY WITH CO2 INSUFFLATION N/A 2020    Procedure: COLONOSCOPY, WITH CO2 INSUFFLATION;  Surgeon: Jonathan Rivera MD;  Location: [facility] OR     SMALL BOWEL RESECTION      partial     VASECTOMY         Social History     Tobacco Use     Smoking status: Former     Types: Cigarettes     Quit date: 4/15/1984     Years since quittin.6     Passive exposure: Past     Smokeless tobacco: Never   Substance Use Topics     Alcohol use: Yes     Comment: 1 beer every  day     No family history on file.      Current Outpatient Medications   Medication Sig Dispense  Refill     ALPRAZolam (XANAX) 0.25 MG tablet TAKE 1 TABLET BY MOUTH THREE TIMES A DAY AS NEEDED FOR ANXIETY 30 tablet 0     atorvastatin (LIPITOR) 10 MG tablet TAKE 1 TABLET (10 MG) BY MOUTH DAILY. 90 tablet 4     HYDROcodone-acetaminophen (NORCO) 5-325 MG tablet Take 1 tablet by mouth every 6 hours as needed for pain 30 tablet 0     Na Sulfate-K Sulfate-Mg Sulf (SUPREP BOWEL PREP) solution Take 177 mLs (1 Bottle) by mouth See Admin Instructions -Evening before procedure complete steps 1 through 4 (see package) using (1) 6 ounce bottle before bed.  Morning of procedure, repeat steps 1 through 4 using the other 6 ounce bottle. 2 Bottle 0     Pediatric Multiple Vit-Vit C (VITAMIN DAILY PO) Take 500 mg by mouth daily.       polyethylene glycol (GOLYTELY) 236 g suspension Take 4,000 mLs (4 L) by mouth See Admin Instructions -Day prior to procedure: 1. Take 1 tablet bisacodyl at 12:00.  2. Mix GoLytely as directed on container.  3.  At 6:00 PM, drink an 8 oz. glass of solution and continue drinking an 8 oz. glass every 15 minutes until bottle is empty. 4 L 0     saw palmetto (SERENOA REPENS) 160 MG capsule Take 3 capsules by mouth daily. 1 capsule 12     TYLENOL 325 MG OR TABS as needed       warfarin ANTICOAGULANT (COUMADIN) 5 MG tablet TAKE 1/2 TAB (2.5MG) EVERY TUE AND 1 TAB (5 MG) ALL OTHER DAYS OR AS DIRECTED BY INR CLINIC 82 tablet 2     zolpidem (AMBIEN) 10 MG tablet TAKE 1 TABLET BY MOUTH EVERY DAY AT BEDTIME AS NEEDED 31 tablet 0     Allergies   Allergen Reactions     Penicillins Rash             Review of Systems   Constitutional:  Negative for chills.   HENT:  Negative for congestion.    Respiratory:  Negative for cough.    Cardiovascular:  Negative for chest pain.   Gastrointestinal:  Negative for abdominal pain, constipation and hematochezia.   Genitourinary:  Negative for hematuria.     Constitutional, HEENT, cardiovascular, pulmonary, gi and gu systems are negative, except as otherwise noted.    OBJECTIVE:  "  /78   Pulse 65   Temp 97.9  F (36.6  C)   Resp 18   Ht 1.648 m (5' 4.9\")   Wt 66.7 kg (147 lb)   SpO2 99%   BMI 24.54 kg/m   Estimated body mass index is 24.54 kg/m  as calculated from the following:    Height as of this encounter: 1.648 m (5' 4.9\").    Weight as of this encounter: 66.7 kg (147 lb).  Physical Exam  GENERAL: healthy, alert and no distress  EYES: Eyes grossly normal to inspection, PERRL and conjunctivae and sclerae normal  HENT: ear canals and TM's normal, nose and mouth without ulcers or lesions  NECK: no adenopathy, no asymmetry, masses, or scars and thyroid normal to palpation  RESP: lungs clear to auscultation - no rales, rhonchi or wheezes  CV: regular rate and rhythm, normal S1 S2, no S3 or S4, no murmur, click or rub, no peripheral edema and peripheral pulses strong  ABDOMEN: soft, nontender, no hepatosplenomegaly, no masses and bowel sounds normal  MS: no gross musculoskeletal defects noted, no edema  SKIN: no suspicious lesions or rashes  NEURO: Normal strength and tone, mentation intact and speech normal  BACK: no CVA tenderness, no paralumbar tenderness  PSYCH: mentation appears normal, affect normal/bright  LYMPH: no cervical, supraclavicular, axillary, or inguinal adenopathy    Diagnostic Test Results:  Labs reviewed in Epic  Results for orders placed or performed in visit on 11/20/23   Urine Creatinine for Drug Screen Panel     Status: None   Result Value Ref Range    Creatinine Urine for Drug Screen 156 mg/dL   [id number] - Urine Drug Confirmation Panel (Comprehensive)     Status: None (In process)    Narrative    The following orders were created for panel order [id number] - Urine Drug Confirmation Panel (Comprehensive).  Procedure                               Abnormality         Status                     ---------                               -----------         ------                     Urine Drug Confirmation ...[[id number]]                      In process               "   Urine Creatinine for Kevin...[[id number]]                      Final result                 Please view results for these tests on the individual orders.       ASSESSMENT / PLAN:   Oh was seen today for physical.    Diagnoses and all orders for this visit:    Chronic pain syndrome  -     [id number] - Urine Drug Confirmation Panel (Comprehensive); Future  -     [id number] - Urine Drug Confirmation Panel (Comprehensive)    Need for vaccination against respiratory syncytial virus  -     respiratory syncytial virus vaccine, bivalent (ABRYSVO) injection; Inject 0.5 mLs into the muscle once for 1 dose    Coagulation defect (H24)    Other orders  -     REVIEW OF HEALTH MAINTENANCE PROTOCOL ORDERS              COUNSELING:  Reviewed preventive health counseling, as reflected in patient instructions       Regular exercise       Healthy diet/nutrition       Vision screening       Hearing screening       Bladder control       Colon cancer screening        He reports that he quit smoking about 39 years ago. His smoking use included cigarettes. He has been exposed to tobacco smoke. He has never used smokeless tobacco.      Appropriate preventive services were discussed with this patient, including applicable screening as appropriate for fall prevention, nutrition, physical activity, Tobacco-use cessation, weight loss and cognition.  Checklist reviewing preventive services available has been given to the patient.    Reviewed patients plan of care and provided an AVS. The Basic Care Plan (routine screening as documented in Health Maintenance) for Oh meets the Care Plan requirement. This Care Plan has been established and reviewed with the Patient.          Brendon Kay MD  [facility]    Identified Health Risks:  I have reviewed Opioid Use Disorder and Substance Use Disorder risk factors and made any needed referrals.

## 2023-11-22 LAB — A-OH ALPRAZ UR QL CFM: PRESENT

## 2023-11-27 DIAGNOSIS — F51.01 PRIMARY INSOMNIA: ICD-10-CM

## 2023-11-27 RX ORDER — ZOLPIDEM TARTRATE 10 MG/1
TABLET ORAL
Qty: 31 TABLET | Refills: 0 | Status: SHIPPED | OUTPATIENT
Start: 2023-11-27 | End: 2024-01-02

## 2023-12-04 ENCOUNTER — LAB (OUTPATIENT)
Dept: LAB | Facility: CLINIC | Age: 78
End: 2023-12-04
Payer: COMMERCIAL

## 2023-12-04 ENCOUNTER — ANTICOAGULATION THERAPY VISIT (OUTPATIENT)
Dept: ANTICOAGULATION | Facility: CLINIC | Age: 78
End: 2023-12-04

## 2023-12-04 DIAGNOSIS — D68.9 COAGULATION DEFECT (H): ICD-10-CM

## 2023-12-04 DIAGNOSIS — Z79.01 LONG TERM CURRENT USE OF ANTICOAGULANT THERAPY: ICD-10-CM

## 2023-12-04 DIAGNOSIS — D68.51 FACTOR V LEIDEN MUTATION (H): ICD-10-CM

## 2023-12-04 DIAGNOSIS — D68.9 COAGULATION DEFECT (H): Primary | ICD-10-CM

## 2023-12-04 LAB — INR BLD: 3 (ref 0.9–1.1)

## 2023-12-04 PROCEDURE — 36416 COLLJ CAPILLARY BLOOD SPEC: CPT

## 2023-12-04 PROCEDURE — 85610 PROTHROMBIN TIME: CPT

## 2023-12-04 NOTE — PROGRESS NOTES
ANTICOAGULATION MANAGEMENT     Oh Solares 78 year old male is on warfarin with therapeutic INR result. (Goal INR 2.0-3.0)    Recent labs: (last 7 days)     12/04/23  1115   INR 3.0*       ASSESSMENT     Source(s): Chart Review and Patient/Caregiver Call     Warfarin doses taken: Warfarin taken as instructed  Diet: No new diet changes identified  Medication/supplement changes: None noted  New illness, injury, or hospitalization: No  Signs or symptoms of bleeding or clotting: No  Previous result: Subtherapeutic  Additional findings: None       PLAN     Recommended plan for no diet, medication or health factor changes affecting INR     Dosing Instructions: Continue your current warfarin dose with next INR in 3 weeks    3 weeks is Nash, Oh would like to stay with Monday labs request to go out to 1/8/23    Summary  As of 12/4/2023      Full warfarin instructions:  2.5 mg every Mon; 5 mg all other days   Next INR check:  1/8/2024               Telephone call with Oh who agrees to plan and repeated back plan correctly    Lab visit scheduled    Education provided:   Please call back if any changes to your diet, medications or how you've been taking warfarin  Goal range and lab monitoring: goal range and significance of current result, Importance of therapeutic range, and Importance of following up at instructed interval    Plan made per [facility] anticoagulation protocol    Kristen Velazquez RN  Anticoagulation Clinic  12/4/2023    _______________________________________________________________________     Anticoagulation Episode Summary       Current INR goal:  2.0-3.0   TTR:  79.9% (1 y)   Target end date:  Indefinite   Send INR reminders to:  JESSICA MOY    Indications    Coagulation defect (HCC) [D68.9] [D68.9]  Factor V Leiden mutation (H24) [D68.51]  Long-term (current) use of anticoagulants [Z79.01] [Z79.01]             Comments:               Anticoagulation Care Providers       Provider Role  Specialty Phone number    Brendon Kay MD Referring Internal Medicine - Pediatrics [phone]    Karina Galvez PA-C Referring Family Medicine [phone]

## 2023-12-06 DIAGNOSIS — F41.9 ANXIETY: ICD-10-CM

## 2023-12-06 RX ORDER — ALPRAZOLAM 0.25 MG
TABLET ORAL
Qty: 30 TABLET | Refills: 0 | Status: SHIPPED | OUTPATIENT
Start: 2023-12-06 | End: 2024-03-04

## 2024-01-02 DIAGNOSIS — G89.29 CHRONIC BILATERAL LOW BACK PAIN WITHOUT SCIATICA: ICD-10-CM

## 2024-01-02 DIAGNOSIS — M54.50 CHRONIC BILATERAL LOW BACK PAIN WITHOUT SCIATICA: ICD-10-CM

## 2024-01-02 DIAGNOSIS — F51.01 PRIMARY INSOMNIA: ICD-10-CM

## 2024-01-02 DIAGNOSIS — G89.4 CHRONIC PAIN SYNDROME: ICD-10-CM

## 2024-01-02 RX ORDER — ZOLPIDEM TARTRATE 10 MG/1
TABLET ORAL
Qty: 31 TABLET | Refills: 0 | Status: SHIPPED | OUTPATIENT
Start: 2024-01-02 | End: 2024-02-19

## 2024-01-02 RX ORDER — HYDROCODONE BITARTRATE AND ACETAMINOPHEN 5; 325 MG/1; MG/1
1 TABLET ORAL EVERY 6 HOURS PRN
Qty: 30 TABLET | Refills: 0 | Status: SHIPPED | OUTPATIENT
Start: 2024-01-02 | End: 2024-03-22

## 2024-01-08 ENCOUNTER — LAB (OUTPATIENT)
Dept: LAB | Facility: CLINIC | Age: 79
End: 2024-01-08
Payer: COMMERCIAL

## 2024-01-08 ENCOUNTER — ANTICOAGULATION THERAPY VISIT (OUTPATIENT)
Dept: ANTICOAGULATION | Facility: CLINIC | Age: 79
End: 2024-01-08

## 2024-01-08 DIAGNOSIS — D68.9 COAGULATION DEFECT (H): Primary | ICD-10-CM

## 2024-01-08 DIAGNOSIS — D68.51 FACTOR V LEIDEN MUTATION (H): ICD-10-CM

## 2024-01-08 DIAGNOSIS — Z79.01 LONG TERM CURRENT USE OF ANTICOAGULANT THERAPY: ICD-10-CM

## 2024-01-08 DIAGNOSIS — D68.9 COAGULATION DEFECT (H): ICD-10-CM

## 2024-01-08 LAB — INR BLD: 2.4 (ref 0.9–1.1)

## 2024-01-08 PROCEDURE — 36416 COLLJ CAPILLARY BLOOD SPEC: CPT

## 2024-01-08 PROCEDURE — 85610 PROTHROMBIN TIME: CPT

## 2024-01-08 NOTE — PROGRESS NOTES
ANTICOAGULATION MANAGEMENT     Oh Solares 78 year old male is on warfarin with therapeutic INR result. (Goal INR 2.0-3.0)    Recent labs: (last 7 days)     01/08/24  1136   INR 2.4*       ASSESSMENT     Source(s): Chart Review and Patient/Caregiver Call     Warfarin doses taken: Warfarin taken as instructed  Diet: No new diet changes identified  Medication/supplement changes: None noted  New illness, injury, or hospitalization: No  Signs or symptoms of bleeding or clotting: No  Previous result: Therapeutic last visit; previously outside of goal range  Additional findings: None       PLAN     Recommended plan for no diet, medication or health factor changes affecting INR     Dosing Instructions: Continue your current warfarin dose with next INR in 6 weeks       Summary  As of 1/8/2024      Full warfarin instructions:  2.5 mg every Mon; 5 mg all other days   Next INR check:  2/19/2024               Telephone call with Oh who verbalizes understanding and agrees to plan    Patient elected to schedule next visit in 8 weeks as he will be out of town until 3/1/24.    Education provided:   Symptom monitoring: monitoring for bleeding signs and symptoms, monitoring for clotting signs and symptoms, and when to seek medical attention/emergency care  Importance of notifying anticoagulation clinic for: changes in medications; a sooner lab recheck maybe needed and diarrhea, nausea/vomiting, reduced intake, cold/flu, and/or infections; a sooner lab recheck maybe needed  Contact [phone]  with any changes, questions or concerns.     Plan made per [facility] anticoagulation protocol    Jaymie Lemon RN  Anticoagulation Clinic  1/8/2024    _______________________________________________________________________     Anticoagulation Episode Summary       Current INR goal:  2.0-3.0   TTR:  85.4% (1 y)   Target end date:  Indefinite   Send INR reminders to:  JESSICA MOY    Indications    Coagulation defect (HCC) [D68.9]  [D68.9]  Factor V Leiden mutation (H24) [D68.51]  Long-term (current) use of anticoagulants [Z79.01] [Z79.01]             Comments:               Anticoagulation Care Providers       Provider Role Specialty Phone number    Brendon Kay MD Referring Internal Medicine - Pediatrics [phone]    Karina Galvez PA-C Referring Family Medicine [phone]

## 2024-02-18 DIAGNOSIS — F51.01 PRIMARY INSOMNIA: ICD-10-CM

## 2024-02-19 RX ORDER — ZOLPIDEM TARTRATE 10 MG/1
TABLET ORAL
Qty: 31 TABLET | Refills: 0 | Status: SHIPPED | OUTPATIENT
Start: 2024-02-19 | End: 2024-03-25

## 2024-03-01 DIAGNOSIS — F41.9 ANXIETY: ICD-10-CM

## 2024-03-04 ENCOUNTER — ANTICOAGULATION THERAPY VISIT (OUTPATIENT)
Dept: ANTICOAGULATION | Facility: CLINIC | Age: 79
End: 2024-03-04

## 2024-03-04 ENCOUNTER — LAB (OUTPATIENT)
Dept: LAB | Facility: CLINIC | Age: 79
End: 2024-03-04
Payer: COMMERCIAL

## 2024-03-04 DIAGNOSIS — Z79.01 LONG TERM CURRENT USE OF ANTICOAGULANT THERAPY: ICD-10-CM

## 2024-03-04 DIAGNOSIS — D68.9 COAGULATION DEFECT (H): ICD-10-CM

## 2024-03-04 DIAGNOSIS — D68.51 FACTOR V LEIDEN MUTATION (H): ICD-10-CM

## 2024-03-04 DIAGNOSIS — D68.9 COAGULATION DEFECT (H): Primary | ICD-10-CM

## 2024-03-04 LAB — INR BLD: 3.1 (ref 0.9–1.1)

## 2024-03-04 PROCEDURE — 36416 COLLJ CAPILLARY BLOOD SPEC: CPT

## 2024-03-04 PROCEDURE — 85610 PROTHROMBIN TIME: CPT

## 2024-03-04 RX ORDER — ALPRAZOLAM 0.25 MG
TABLET ORAL
Qty: 30 TABLET | Refills: 0 | Status: SHIPPED | OUTPATIENT
Start: 2024-03-04 | End: 2024-04-08

## 2024-03-04 NOTE — PROGRESS NOTES
ANTICOAGULATION MANAGEMENT     Oh Solares 79 year old male is on warfarin with supratherapeutic INR result. (Goal INR 2.0-3.0)    Recent labs: (last 7 days)     03/04/24  1114   INR 3.1*       ASSESSMENT     Source(s): Chart Review and Patient/Caregiver Call     Warfarin doses taken: Warfarin taken as instructed  Diet: No new diet changes identified  Medication/supplement changes: None noted  New illness, injury, or hospitalization: No  Signs or symptoms of bleeding or clotting: No  Previous result: Therapeutic last 2(+) visits  Additional findings: None       PLAN     Recommended plan for no diet, medication or health factor changes affecting INR     Dosing Instructions: Continue your current warfarin dose with next INR in 2 weeks       Summary  As of 3/4/2024      Full warfarin instructions:  2.5 mg every Mon; 5 mg all other days   Next INR check:  3/22/2024               Telephone call with Oh who verbalizes understanding and agrees to plan    Lab visit scheduled    Education provided:   Contact [phone]  with any changes, questions or concerns.     Plan made per [facility] anticoagulation protocol    Trent PATEL RN  Anticoagulation Clinic  3/4/2024    _______________________________________________________________________     Anticoagulation Episode Summary       Current INR goal:  2.0-3.0   TTR:  83.2% (1 y)   Target end date:  Indefinite   Send INR reminders to:  JESSICA MOY    Indications    Coagulation defect (HCC) [D68.9] [D68.9]  Factor V Leiden mutation (H24) [D68.51]  Long-term (current) use of anticoagulants [Z79.01] [Z79.01]             Comments:               Anticoagulation Care Providers       Provider Role Specialty Phone number    Brendon Kay MD Referring Internal Medicine - Pediatrics [phone]    Karina Galvez PA-C Referring Family Medicine [phone]

## 2024-03-18 ENCOUNTER — OFFICE VISIT (OUTPATIENT)
Dept: FAMILY MEDICINE | Facility: CLINIC | Age: 79
End: 2024-03-18
Payer: COMMERCIAL

## 2024-03-18 ENCOUNTER — DOCUMENTATION ONLY (OUTPATIENT)
Dept: ANTICOAGULATION | Facility: CLINIC | Age: 79
End: 2024-03-18

## 2024-03-18 ENCOUNTER — ANCILLARY PROCEDURE (OUTPATIENT)
Dept: GENERAL RADIOLOGY | Facility: CLINIC | Age: 79
End: 2024-03-18
Attending: FAMILY MEDICINE
Payer: COMMERCIAL

## 2024-03-18 VITALS
WEIGHT: 151.8 LBS | OXYGEN SATURATION: 97 % | BODY MASS INDEX: 25.29 KG/M2 | HEIGHT: 65 IN | SYSTOLIC BLOOD PRESSURE: 175 MMHG | DIASTOLIC BLOOD PRESSURE: 84 MMHG | HEART RATE: 71 BPM | TEMPERATURE: 97.2 F | RESPIRATION RATE: 16 BRPM

## 2024-03-18 DIAGNOSIS — R03.0 ELEVATED BLOOD PRESSURE READING WITHOUT DIAGNOSIS OF HYPERTENSION: ICD-10-CM

## 2024-03-18 DIAGNOSIS — D68.9 COAGULATION DEFECT (H): ICD-10-CM

## 2024-03-18 DIAGNOSIS — Z79.01 ON WARFARIN THERAPY: ICD-10-CM

## 2024-03-18 DIAGNOSIS — J20.9 ACUTE BRONCHITIS WITH SYMPTOMS > 10 DAYS: Primary | ICD-10-CM

## 2024-03-18 DIAGNOSIS — J20.9 ACUTE BRONCHITIS WITH SYMPTOMS > 10 DAYS: ICD-10-CM

## 2024-03-18 PROCEDURE — 99214 OFFICE O/P EST MOD 30 MIN: CPT | Performed by: FAMILY MEDICINE

## 2024-03-18 PROCEDURE — 71046 X-RAY EXAM CHEST 2 VIEWS: CPT | Mod: TC | Performed by: RADIOLOGY

## 2024-03-18 RX ORDER — BENZONATATE 100 MG/1
100 CAPSULE ORAL 3 TIMES DAILY PRN
Qty: 30 CAPSULE | Refills: 0 | Status: SHIPPED | OUTPATIENT
Start: 2024-03-18

## 2024-03-18 RX ORDER — AZITHROMYCIN 250 MG/1
TABLET, FILM COATED ORAL
Qty: 6 TABLET | Refills: 0 | Status: SHIPPED | OUTPATIENT
Start: 2024-03-18 | End: 2024-03-23

## 2024-03-18 ASSESSMENT — PAIN SCALES - GENERAL: PAINLEVEL: NO PAIN (0)

## 2024-03-18 ASSESSMENT — ENCOUNTER SYMPTOMS
HEADACHES: 1
SORE THROAT: 1
SINUS PRESSURE: 1
CHILLS: 1
COUGH: 1
SWEATS: 1

## 2024-03-18 ASSESSMENT — LIFESTYLE VARIABLES: SMOKING_STATUS: 0

## 2024-03-18 NOTE — PROGRESS NOTES
ANTICOAGULATION  MANAGEMENT     Interacting Medication Review    Interacting medication(s): Azithromycin (Zithromax, Z-sunil) with warfarin.    Duration: 5 days (3/18/24 to 3/22/24)    Indication:  Acute bronchitis with symptoms >10 days    New medication?: Yes, interaction may increase INR and risk of bleeding. Closer INR monitoring recommended.        PLAN     No adjustment to anticoagulation plan needed; appropriate follow up already scheduled         Patient was not contacted    No adjustment to anticoagulation calendar was required    Patient has INR scheduled on 3/22/24, no indication to check sooner based on the medication prescribed, would like to check INR due to illness to advise on any dose adjustments needed.    Plan made per [facility] anticoagulation protocol    Jaymie Lemon, RN  Anticoagulation Clinic

## 2024-03-18 NOTE — PROGRESS NOTES
"    Assessment & Plan     Acute bronchitis with symptoms > 10 days  - has been having productive cough for the past 6 weeks.  -Travel to [place] recently.  -Wife was sick with cough, has improved.  -Denied smoking.  Quit several years back (50 yrs).    PLAN:  -Order chest x-ray, azithromycin, Tessalon Perles as needed.    -Notified interaction between azithromycin and warfarin.  If he is noticing any bleeding episodes, he will stop the medication and go to the ER.  INR check after course of antibiotics.    - XR Chest 2 Views; Future  - azithromycin (ZITHROMAX) 250 MG tablet; Take 2 tablets (500 mg) by mouth daily for 1 day, THEN 1 tablet (250 mg) daily for 4 days.  - benzonatate (TESSALON) 100 MG capsule; Take 1 capsule (100 mg) by mouth 3 times daily as needed for cough    Coagulation defect (H24)  -Factor V Leiden mutation, on warfarin.    On warfarin therapy  -Last INR 2 weeks ago was 3.1.    Elevated blood pressure reading without diagnosis of hypertension  -Blood pressures have been normal during PCP visit in Nov 2023.  -Likely due to acute illness, will reassess.        30 minutes spent by me on the date of the encounter doing chart review, patient visit, and documentation       BMI  Estimated body mass index is 25.35 kg/m  as calculated from the following:    Height as of this encounter: 1.648 m (5' 4.88\").    Weight as of this encounter: 68.9 kg (151 lb 12.8 oz).           Kong Casiano is a 79 year old, presenting for the following health issues:  Cough and Sinus Problem      3/18/2024    10:46 AM   Additional Questions   Roomed by Caro   Accompanied by self     Cough  This is a new problem. The current episode started more than 1 week ago. The problem occurs every few hours. The problem has not changed since onset.The cough is Productive of sputum. There has been no fever. Associated symptoms include chills, sweats, headaches and sore throat. He has tried cough syrup and mist for the " "symptoms. Risk factors include travel to endemic areas. He is not a smoker.   Sinus Problem   This is a recurrent problem. The current episode started more than 1 week ago. There has been no fever. The fever has been present for Less than 1 day. The patient is experiencing no pain. The pain has been Intermittent since onset. Associated symptoms include chills, sweats, congestion, sinus pressure, sore throat and cough.   History of Present Illness       Reason for visit:  Congestion headache cough  Symptom onset:  3-4 weeks ago  Symptoms include:  SINUS,COUGH,HEADACHE  Symptom intensity:  Moderate  Symptom progression:  Staying the same  Had these symptoms before:  No    He eats 0-1 servings of fruits and vegetables daily.He consumes 1 sweetened beverage(s) daily.He exercises with enough effort to increase his heart rate 20 to 29 minutes per day.    He is taking medications regularly.                 Review of Systems  Constitutional, HEENT, cardiovascular, pulmonary, gi and gu systems are negative, except as otherwise noted.      Objective    BP (!) 174/82 (BP Location: Left arm, Patient Position: Sitting, Cuff Size: Adult Regular)   Pulse 71   Temp 97.2  F (36.2  C) (Tympanic)   Resp 16   Ht 1.648 m (5' 4.88\")   Wt 68.9 kg (151 lb 12.8 oz)   SpO2 97%   BMI 25.35 kg/m    Body mass index is 25.35 kg/m .  Physical Exam   GENERAL: alert and no distress  NECK: no adenopathy, no asymmetry, masses, or scars  RESP: lungs clear to auscultation - no rales, rhonchi or wheezes  CV: regular rate and rhythm, normal S1 S2, no S3 or S4, no murmur, click or rub, no peripheral edema  ABDOMEN: soft, nontender, no hepatosplenomegaly, no masses and bowel sounds normal  MS: no gross musculoskeletal defects noted, no edema            Signed Electronically by: Joycelyn Garland MD    "

## 2024-03-21 ENCOUNTER — TELEPHONE (OUTPATIENT)
Dept: FAMILY MEDICINE | Facility: CLINIC | Age: 79
End: 2024-03-21
Payer: COMMERCIAL

## 2024-03-21 DIAGNOSIS — G89.29 CHRONIC BILATERAL LOW BACK PAIN WITHOUT SCIATICA: ICD-10-CM

## 2024-03-21 DIAGNOSIS — M54.50 CHRONIC BILATERAL LOW BACK PAIN WITHOUT SCIATICA: ICD-10-CM

## 2024-03-21 DIAGNOSIS — G89.4 CHRONIC PAIN SYNDROME: ICD-10-CM

## 2024-03-21 NOTE — TELEPHONE ENCOUNTER
PT called regarding Refill for Hydrocodone PT says a return call works at any time   
doxicycline/Yes

## 2024-03-22 ENCOUNTER — LAB (OUTPATIENT)
Dept: LAB | Facility: CLINIC | Age: 79
End: 2024-03-22
Payer: COMMERCIAL

## 2024-03-22 ENCOUNTER — OFFICE VISIT (OUTPATIENT)
Dept: DERMATOLOGY | Facility: CLINIC | Age: 79
End: 2024-03-22
Payer: COMMERCIAL

## 2024-03-22 ENCOUNTER — ANTICOAGULATION THERAPY VISIT (OUTPATIENT)
Dept: ANTICOAGULATION | Facility: CLINIC | Age: 79
End: 2024-03-22

## 2024-03-22 DIAGNOSIS — L82.1 SEBORRHEIC KERATOSES: ICD-10-CM

## 2024-03-22 DIAGNOSIS — Z12.83 ENCOUNTER FOR SCREENING FOR MALIGNANT NEOPLASM OF SKIN: ICD-10-CM

## 2024-03-22 DIAGNOSIS — Z79.01 LONG TERM CURRENT USE OF ANTICOAGULANT THERAPY: ICD-10-CM

## 2024-03-22 DIAGNOSIS — L81.4 LENTIGINES: ICD-10-CM

## 2024-03-22 DIAGNOSIS — Z85.828 HISTORY OF BASAL CELL CARCINOMA (BCC): ICD-10-CM

## 2024-03-22 DIAGNOSIS — D68.51 FACTOR V LEIDEN MUTATION (H): ICD-10-CM

## 2024-03-22 DIAGNOSIS — D22.9 MULTIPLE BENIGN NEVI: Primary | ICD-10-CM

## 2024-03-22 DIAGNOSIS — D68.9 COAGULATION DEFECT (H): Primary | ICD-10-CM

## 2024-03-22 DIAGNOSIS — D68.9 COAGULATION DEFECT (H): ICD-10-CM

## 2024-03-22 DIAGNOSIS — D18.01 CHERRY ANGIOMA: ICD-10-CM

## 2024-03-22 DIAGNOSIS — L57.0 AK (ACTINIC KERATOSIS): ICD-10-CM

## 2024-03-22 LAB — INR BLD: 1.8 (ref 0.9–1.1)

## 2024-03-22 PROCEDURE — 99213 OFFICE O/P EST LOW 20 MIN: CPT | Mod: 25 | Performed by: NURSE PRACTITIONER

## 2024-03-22 PROCEDURE — 85610 PROTHROMBIN TIME: CPT

## 2024-03-22 PROCEDURE — 36416 COLLJ CAPILLARY BLOOD SPEC: CPT

## 2024-03-22 PROCEDURE — 17000 DESTRUCT PREMALG LESION: CPT | Performed by: NURSE PRACTITIONER

## 2024-03-22 RX ORDER — HYDROCODONE BITARTRATE AND ACETAMINOPHEN 5; 325 MG/1; MG/1
1 TABLET ORAL EVERY 6 HOURS PRN
Qty: 30 TABLET | Refills: 0 | Status: SHIPPED | OUTPATIENT
Start: 2024-03-22 | End: 2024-05-08

## 2024-03-22 NOTE — PATIENT INSTRUCTIONS
Patient Education       Proper skin care from [facility]:    -Eliminate harsh soaps as they strip the natural oils from the skin, often resulting in dry itchy skin ( i.e. Dial, Zest, Turkish Spring)  -Use mild soaps such as Cetaphil or Dove Sensitive Skin in the shower. You do not need to use soap on arms, legs, and trunk every time you shower unless visibly soiled.   -Avoid hot or cold showers.  -After showering, lightly dry off and apply moisturizing within 2-3 minutes. This will help trap moisture in the skin.   -Aggressive use of a moisturizer at least 1-2 times a day to the entire body (including -Vanicream, Cetaphil, Aquaphor or Cerave) and moisturize hands after every washing.  -We recommend using moisturizers that come in a tub that needs to be scooped out, not a pump. This has more of an oil base. It will hold moisture in your skin much better than a water base moisturizer. The above recommended are non-pore clogging.      Wear a sunscreen with at least SPF 30 on your face, ears, neck and V of the chest daily. Wear sunscreen on other areas of the body if those areas are exposed to the sun throughout the day. Sunscreens can contain physical and/or chemical blockers. Physical blockers are less likely to clog pores, these include zinc oxide and titanium dioxide. Reapply every two hour and after swimming.     Sunscreen examples: https://www.ewg.org/sunscreen/    UV radiation  UVA radiation remains constant throughout the day and throughout the year. It is a longer wavelength than UVB and therefore penetrates deeper into the skin leading to immediate and delayed tanning, photoaging, and skin cancer. 70-80% of UVA and UVB radiation occurs between the hours of 10am-2pm.  UVB radiation  UVB radiation causes the most harmful effects and is more significant during the summer months. However, snow and ice can reflect UVB radiation leading to skin damage during the winter months as well. UVB radiation is  responsible for tanning, burning, inflammation, delayed erythema (pinkness), pigmentation (brown spots), and skin cancer.     I recommend self monthly full body exams and yearly full body exams with a dermatology provider. If you develop a new or changing lesion please follow up for examination. Most skin cancers are pink and scaly or pink and pearly. However, we do see blue/brown/black skin cancers.  Consider the ABCDEs of melanoma when giving yourself your monthly full body exam ( don't forget the groin, buttocks, feet, toes, etc). A-asymmetry, B-borders, C-color, D-diameter, E-elevation or evolving. If you see any of these changes please follow up in clinic. If you cannot see your back I recommend purchasing a hand held mirror to use with a larger wall mirror.       Checking for Skin Cancer  You can find cancer early by checking your skin each month. There are 3 kinds of skin cancer. They are melanoma, basal cell carcinoma, and squamous cell carcinoma. Doing monthly skin checks is the best way to find new marks or skin changes. Follow the instructions below for checking your skin.   The ABCDEs of checking moles for melanoma   Check your moles or growths for signs of melanoma using ABCDE:   Asymmetry: the sides of the mole or growth don t match  Border: the edges are ragged, notched, or blurred  Color: the color within the mole or growth varies  Diameter: the mole or growth is larger than 6 mm (size of a pencil eraser)  Evolving: the size, shape, or color of the mole or growth is changing (evolving is not shown in the images below)    Checking for other types of skin cancer  Basal cell carcinoma or squamous cell carcinoma have symptoms such as:     A spot or mole that looks different from all other marks on your skin  Changes in how an area feels, such as itching, tenderness, or pain  Changes in the skin's surface, such as oozing, bleeding, or scaliness  A sore that does not heal  New swelling or redness beyond  the border of a mole    Who s at risk?  Anyone can get skin cancer. But you are at greater risk if you have:   Fair skin, light-colored hair, or light-colored eyes  Many moles or abnormal moles on your skin  A history of sunburns from sunlight or tanning beds  A family history of skin cancer  A history of exposure to radiation or chemicals  A weakened immune system  If you have had skin cancer in the past, you are at risk for recurring skin cancer.   How to check your skin  Do your monthly skin checkups in front of a full-length mirror. Check all parts of your body, including your:   Head (ears, face, neck, and scalp)  Torso (front, back, and sides)  Arms (tops, undersides, upper, and lower armpits)  Hands (palms, backs, and fingers, including under the nails)  Buttocks and genitals  Legs (front, back, and sides)  Feet (tops, soles, toes, including under the nails, and between toes)  If you have a lot of moles, take digital photos of them each month. Make sure to take photos both up close and from a distance. These can help you see if any moles change over time.   Most skin changes are not cancer. But if you see any changes in your skin, call your doctor right away. Only he or she can diagnose a problem. If you have skin cancer, seeing your doctor can be the first step toward getting the treatment that could save your life.   [employer] last reviewed this educational content on 4/1/2019 2000-2020 [employer]. [address] [place] [address], [place] [address]. All rights reserved. This information is not intended as a substitute for professional medical care. Always follow your healthcare professional's instructions.       When should I call my doctor?  If you are worsening or not improving, please, contact us or seek urgent care as noted below.     Who should I call with questions (adults)?  [facility] (adult and pediatric): [phone]  [facility]  [place] (adult): [phone]  [facility] ([place], [place], [place] and [place]) [phone]  For urgent needs outside of business hours call the [facility] at [phone] and ask for the dermatology resident on call to be paged  If this is a medical emergency and you are unable to reach an ER, Call 911      If you need a prescription refill, please contact your pharmacy. Refills are approved or denied by our Physicians during normal business hours, Monday through Fridays    Per office policy, refills will not be granted if you have not been seen within the past year (or sooner depending on your condition)

## 2024-03-22 NOTE — PROGRESS NOTES
/[facility] Note  E/*ncounter Date: Mar 22, 2024  Office Visit     Reviewed patients past medical history and pertinent chart review prior to patients visit today.     Dermatology Problem List:  History of BCC on left chest and right chest s/p excisions 9/26/22  History of actinic keratosis     Patient denies family history of skin cancer or dysplastic nevi.      ____________________________________________    Assessment & Plan:     # History of BCC on left and right chest. Well healed scars without signs of recurrence.     # Actinic keratosis, right clavicular notch. Premalignant nature discussed with patient. Treatment options discussed with patient today including no treatment, topical treatment, and cryotherapy. Patient elects to treat visible lesions today with cryotherapy. After verbal consent and discussion of risks and benefits including but no limited to dyspigmentation/scar, blister, and pain. A total of 1 actinic keratoses were treated with 1-2mm freeze border for 2 cycle with liquid nitrogen. Post cryotherapy instructions were provided.     # Benign skin findings including: seborrheic keratoses, cherry angioma, lentigines and benign nevi.   - No further intervention required. Patient to report changes.   - Patient reassured of the benign nature of these lesions.    #Signs and Symptoms of non-melanoma skin cancer and ABCDEs of melanoma reviewed with patient. Patient encouraged to perform monthly self skin exams and educated on how to perform them. UV precautions reviewed with patient. Patient was asked about new or changing moles/lesions on body.     #Reviewed Sunscreen: Apply 20 minutes prior to going outdoors and reapply every two hours, when wet or sweating. We recommend using an SPF 30 or higher, and to use one that is water resistant.       Follow-up:  1 years for follow up full body skin exam, prn for new or changing lesions or new concerns    Marycruz Marcano  CNP  Dermatology     ____________________________________________    CC: Skin Check (BCC x 2 left and right chest 2023)    HPI:  Mr. Oh Solares is a(n) 79 year old male who presents today as a return patient for a full body skin cancer screening. Patient has no specific concerns today.     Patient is otherwise feeling well, without additional skin concerns.     Physical Exam:  Vitals: There were no vitals taken for this visit.  SKIN: Total skin excluding the genitalia areas was performed. The exam included the head/face, neck, both arms, chest, back, abdomen, both legs, digits, mons pubis, buttock and nails.   -well healed scars on left and right chest without signs of recurrence of malignancy  -right clavicular notch, pink scaly hyperkeratotic papule  -several 1-2mm red dome shaped symmetric papules scattered on the trunk  -multiple tan/brown flat round macules and raised papules scattered throughout trunk, extremities and head. No worrisome features for malignancy noted on examination.  -scattered tan, homogenous macules scattered on sun exposed areas of trunk, extremities and face.   -scattered waxy, stuck on tan/brown papules and patches on the trunk     - No other lesions of concern on areas examined.     Medications:  Current Outpatient Medications   Medication    ALPRAZolam (XANAX) 0.25 MG tablet    atorvastatin (LIPITOR) 10 MG tablet    azithromycin (ZITHROMAX) 250 MG tablet    benzonatate (TESSALON) 100 MG capsule    HYDROcodone-acetaminophen (NORCO) 5-325 MG tablet    Na Sulfate-K Sulfate-Mg Sulf (SUPREP BOWEL PREP) solution    Pediatric Multiple Vit-Vit C (VITAMIN DAILY PO)    polyethylene glycol (GOLYTELY) 236 g suspension    saw palmetto (SERENOA REPENS) 160 MG capsule    TYLENOL 325 MG OR TABS    warfarin ANTICOAGULANT (COUMADIN) 5 MG tablet    zolpidem (AMBIEN) 10 MG tablet     No current facility-administered medications for this visit.      Past Medical History:   Patient Active Problem List    Diagnosis    Factor V Leiden mutation (H24)    GI bleed    ED (erectile dysfunction)    CARDIOVASCULAR SCREENING; LDL GOAL LESS THAN 160    Advanced directives, counseling/discussion    Sleep stage dysfunction    Anxiety    Dupuytren's contracture - left    Chronic pain    Long-term (current) use of anticoagulants [Z79.01]    Coagulation defect (HCC) [D68.9]    Chronic pain syndrome     Past Medical History:   Diagnosis Date    Asthma     as child    Basal cell carcinoma     ED (erectile dysfunction)     ETOH abuse     Factor V Leiden mutation (H24)     Factor V Leiden mutation (H24)     GI bleed     Neck pain     chiropractor treatment    Plantar fasciitis     Prostatitis     Renal dysfunction     Sleep stage dysfunction     ambien treats well    Thrombosis 01/2002       CC Referred Self, MD  No address on file on close of this encounter.

## 2024-03-22 NOTE — PROGRESS NOTES
ANTICOAGULATION MANAGEMENT     Oh Solares 79 year old male is on warfarin with subtherapeutic INR result. (Goal INR 2.0-3.0)    Recent labs: (last 7 days)     03/22/24  0730   INR 1.8*       ASSESSMENT     Source(s): Chart Review and Patient/Caregiver Call     Warfarin doses taken: Warfarin taken as instructed  Diet: No new diet changes identified  Medication/supplement changes:  z pack 5 day course (dates: 3/18-3/22) no interaction seen.  New illness, injury, or hospitalization: Yes: bronchitis and he is feeling better since antibiotics.   Signs or symptoms of bleeding or clotting: No  Previous result: Supratherapeutic  Additional findings: None       PLAN     Recommended plan for temporary change(s) affecting INR     Dosing Instructions: Continue your current warfarin dose with next INR in 1-2 weeks       Summary  As of 3/22/2024      Full warfarin instructions:  2.5 mg every Mon; 5 mg all other days   Next INR check:  4/5/2024               Telephone call with Oh who agrees to plan and repeated back plan correctly    Lab visit scheduled    Education provided:   Please call back if any changes to your diet, medications or how you've been taking warfarin  Goal range and lab monitoring: goal range and significance of current result and Importance of therapeutic range    Plan made per [facility] anticoagulation protocol    Katie Armstrong, RN  Anticoagulation Clinic  3/22/2024    _______________________________________________________________________     Anticoagulation Episode Summary       Current INR goal:  2.0-3.0   TTR:  82.1% (1 y)   Target end date:  Indefinite   Send INR reminders to:  JESSICA MOY    Indications    Coagulation defect (HCC) [D68.9] [D68.9]  Factor V Leiden mutation (H24) [D68.51]  Long-term (current) use of anticoagulants [Z79.01] [Z79.01]             Comments:               Anticoagulation Care Providers       Provider Role Specialty Phone number    Brendon Kay MD Referring  Internal Medicine - Pediatrics [phone]    Karina Galvez PA-C Referring [facility] [phone]

## 2024-03-22 NOTE — LETTER
3/22/2024         RE: Oh Solares  [address] [address]        Dear Colleague,    Thank you for referring your patient, Oh Solares, to the [facility]. Please see a copy of my visit note below.    /[facility] Note  E/*ncounter Date: Mar 22, 2024  Office Visit     Reviewed patients past medical history and pertinent chart review prior to patients visit today.     Dermatology Problem List:  History of BCC on left chest and right chest s/p excisions 9/26/22  History of actinic keratosis     Patient denies family history of skin cancer or dysplastic nevi.      ____________________________________________    Assessment & Plan:     # History of BCC on left and right chest. Well healed scars without signs of recurrence.     # Actinic keratosis, right clavicular notch. Premalignant nature discussed with patient. Treatment options discussed with patient today including no treatment, topical treatment, and cryotherapy. Patient elects to treat visible lesions today with cryotherapy. After verbal consent and discussion of risks and benefits including but no limited to dyspigmentation/scar, blister, and pain. A total of 1 actinic keratoses were treated with 1-2mm freeze border for 2 cycle with liquid nitrogen. Post cryotherapy instructions were provided.     # Benign skin findings including: seborrheic keratoses, cherry angioma, lentigines and benign nevi.   - No further intervention required. Patient to report changes.   - Patient reassured of the benign nature of these lesions.    #Signs and Symptoms of non-melanoma skin cancer and ABCDEs of melanoma reviewed with patient. Patient encouraged to perform monthly self skin exams and educated on how to perform them. UV precautions reviewed with patient. Patient was asked about new or changing moles/lesions on body.     #Reviewed Sunscreen: Apply 20 minutes prior to going outdoors and  reapply every two hours, when wet or sweating. We recommend using an SPF 30 or higher, and to use one that is water resistant.       Follow-up:  1 years for follow up full body skin exam, prn for new or changing lesions or new concerns    Marycruz Marcano CNP  Dermatology     ____________________________________________    CC: Skin Check (BCC x 2 left and right chest 2023)    HPI:  Mr. Oh Solares is a(n) 79 year old male who presents today as a return patient for a full body skin cancer screening. Patient has no specific concerns today.     Patient is otherwise feeling well, without additional skin concerns.     Physical Exam:  Vitals: There were no vitals taken for this visit.  SKIN: Total skin excluding the genitalia areas was performed. The exam included the head/face, neck, both arms, chest, back, abdomen, both legs, digits, mons pubis, buttock and nails.   -well healed scars on left and right chest without signs of recurrence of malignancy  -right clavicular notch, pink scaly hyperkeratotic papule  -several 1-2mm red dome shaped symmetric papules scattered on the trunk  -multiple tan/brown flat round macules and raised papules scattered throughout trunk, extremities and head. No worrisome features for malignancy noted on examination.  -scattered tan, homogenous macules scattered on sun exposed areas of trunk, extremities and face.   -scattered waxy, stuck on tan/brown papules and patches on the trunk     - No other lesions of concern on areas examined.     Medications:  Current Outpatient Medications   Medication     ALPRAZolam (XANAX) 0.25 MG tablet     atorvastatin (LIPITOR) 10 MG tablet     azithromycin (ZITHROMAX) 250 MG tablet     benzonatate (TESSALON) 100 MG capsule     HYDROcodone-acetaminophen (NORCO) 5-325 MG tablet     Na Sulfate-K Sulfate-Mg Sulf (SUPREP BOWEL PREP) solution     Pediatric Multiple Vit-Vit C (VITAMIN DAILY PO)     polyethylene glycol (GOLYTELY) 236 g suspension     saw palmetto  (SERENOA REPENS) 160 MG capsule     TYLENOL 325 MG OR TABS     warfarin ANTICOAGULANT (COUMADIN) 5 MG tablet     zolpidem (AMBIEN) 10 MG tablet     No current facility-administered medications for this visit.      Past Medical History:   Patient Active Problem List   Diagnosis     Factor V Leiden mutation (H24)     GI bleed     ED (erectile dysfunction)     CARDIOVASCULAR SCREENING; LDL GOAL LESS THAN 160     Advanced directives, counseling/discussion     Sleep stage dysfunction     Anxiety     Dupuytren's contracture - left     Chronic pain     Long-term (current) use of anticoagulants [Z79.01]     Coagulation defect (HCC) [D68.9]     Chronic pain syndrome     Past Medical History:   Diagnosis Date     Asthma     as child     Basal cell carcinoma      ED (erectile dysfunction)      ETOH abuse      Factor V Leiden mutation (H24)      Factor V Leiden mutation (H24)      GI bleed      Neck pain     chiropractor treatment     Plantar fasciitis      Prostatitis      Renal dysfunction      Sleep stage dysfunction     ambien treats well     Thrombosis 01/2002       CC Referred Self, MD  No address on file on close of this encounter.      Again, thank you for allowing me to participate in the care of your patient.        Sincerely,        Lisa Marcano, HOLLIS CNP

## 2024-03-24 DIAGNOSIS — F51.01 PRIMARY INSOMNIA: ICD-10-CM

## 2024-03-25 RX ORDER — ZOLPIDEM TARTRATE 10 MG/1
TABLET ORAL
Qty: 31 TABLET | Refills: 0 | Status: SHIPPED | OUTPATIENT
Start: 2024-03-25 | End: 2024-04-30

## 2024-04-01 ENCOUNTER — LAB (OUTPATIENT)
Dept: LAB | Facility: CLINIC | Age: 79
End: 2024-04-01
Payer: COMMERCIAL

## 2024-04-01 ENCOUNTER — ANTICOAGULATION THERAPY VISIT (OUTPATIENT)
Dept: ANTICOAGULATION | Facility: CLINIC | Age: 79
End: 2024-04-01

## 2024-04-01 DIAGNOSIS — D68.9 COAGULATION DEFECT (H): Primary | ICD-10-CM

## 2024-04-01 DIAGNOSIS — D68.51 FACTOR V LEIDEN MUTATION (H): ICD-10-CM

## 2024-04-01 DIAGNOSIS — Z79.01 LONG TERM CURRENT USE OF ANTICOAGULANT THERAPY: ICD-10-CM

## 2024-04-01 DIAGNOSIS — D68.9 COAGULATION DEFECT (H): ICD-10-CM

## 2024-04-01 LAB — INR BLD: 2.2 (ref 0.9–1.1)

## 2024-04-01 PROCEDURE — 85610 PROTHROMBIN TIME: CPT

## 2024-04-01 PROCEDURE — 36416 COLLJ CAPILLARY BLOOD SPEC: CPT

## 2024-04-08 ENCOUNTER — NURSE TRIAGE (OUTPATIENT)
Dept: FAMILY MEDICINE | Facility: CLINIC | Age: 79
End: 2024-04-08

## 2024-04-08 ENCOUNTER — OFFICE VISIT (OUTPATIENT)
Dept: FAMILY MEDICINE | Facility: CLINIC | Age: 79
End: 2024-04-08
Payer: COMMERCIAL

## 2024-04-08 VITALS
OXYGEN SATURATION: 100 % | TEMPERATURE: 97.7 F | RESPIRATION RATE: 18 BRPM | BODY MASS INDEX: 25.83 KG/M2 | SYSTOLIC BLOOD PRESSURE: 125 MMHG | HEART RATE: 71 BPM | HEIGHT: 65 IN | WEIGHT: 155 LBS | DIASTOLIC BLOOD PRESSURE: 89 MMHG

## 2024-04-08 DIAGNOSIS — J20.9 ACUTE BRONCHITIS WITH SYMPTOMS > 10 DAYS: ICD-10-CM

## 2024-04-08 DIAGNOSIS — R06.00 DYSPNEA, UNSPECIFIED TYPE: Primary | ICD-10-CM

## 2024-04-08 DIAGNOSIS — R05.9 COUGH, UNSPECIFIED TYPE: ICD-10-CM

## 2024-04-08 DIAGNOSIS — F41.9 ANXIETY: ICD-10-CM

## 2024-04-08 PROCEDURE — 99213 OFFICE O/P EST LOW 20 MIN: CPT | Performed by: FAMILY MEDICINE

## 2024-04-08 RX ORDER — PREDNISONE 10 MG/1
20 TABLET ORAL DAILY
Qty: 14 TABLET | Refills: 0 | Status: SHIPPED | OUTPATIENT
Start: 2024-04-08 | End: 2024-04-15

## 2024-04-08 RX ORDER — ALPRAZOLAM 0.25 MG
TABLET ORAL
Qty: 30 TABLET | Refills: 0 | Status: SHIPPED | OUTPATIENT
Start: 2024-04-08 | End: 2024-04-30

## 2024-04-08 ASSESSMENT — PAIN SCALES - GENERAL: PAINLEVEL: NO PAIN (0)

## 2024-04-08 ASSESSMENT — ENCOUNTER SYMPTOMS: COUGH: 1

## 2024-04-08 NOTE — TELEPHONE ENCOUNTER
Patient calling, states that he has had an ongoing cough for the last 3 months. Notes that he will have coughing spells, mainly dry cough but will produce mucus about 4x per day. Denies SOB/trouble breathing or changes to medications. Notes that he had been seen in UC and given antibiotic with no change to symptoms. Notes that he spoke with a friend who had similar symptoms and was prescribed steroid for cough and worked well. Would like appointment with provider to discuss steroid for cough.    Writer assisted patient in scheduling appointment today 04/08/24 with available provider, no further questions.    REJI Mckeon RN  [facility]      Reason for Disposition   Patient wants to be seen    Additional Information   Negative: SEVERE difficulty breathing (e.g., struggling for each breath, speaks in single words)   Negative: Lips or face are bluish now and persists when not coughing   Negative: Sounds like a life-threatening emergency to the triager   Negative: Chest pain is main symptom   Negative: Cough and < 3 weeks duration   Negative: Previous asthma attacks and this feels like an asthma attack   Negative: MODERATE difficulty breathing (e.g., speaks in phrases, SOB even at rest, pulse 100-120) and still present when not coughing   Negative: Chest pain  (Exception: MILD central chest pain, present only when coughing.)   Negative: Increasing difficulty breathing and always has some difficulty breathing   Negative: Patient sounds very sick or weak to the triager   Negative: MILD difficulty breathing (e.g., minimal/no SOB at rest, SOB with walking, pulse < 100) and still present when not coughing  (Exception: No change from usual, chronic shortness of breath.)   Negative: Coughed up blood and > 1 tablespoon (15 ml)  (Exception: Blood-tinged sputum.)   Negative: Fever > 103 F (39.4 C)   Negative: Fever > 101 F (38.3 C) and age > 60 years   Negative: Fever > 100.0 F (37.8 C) and bedridden  (e.g., CVA, chronic illness, recovering from surgery)   Negative: Fever > 100.0 F (37.8 C) and diabetes mellitus or weak immune system (e.g., HIV positive, cancer chemo, splenectomy, organ transplant, chronic steroids)   Negative: SEVERE coughing spells (e.g., whooping sound after coughing, vomiting after coughing)   Negative: Continuous (nonstop) coughing interferes with work or school and no improvement using cough treatment per Care Advice   Negative: Fever present > 3 days (72 hours)   Negative: Coughing up ciara-colored sputum   Negative: Change in color of sputum (e.g., from white to yellow-green sputum)   Negative: Increase in amount of sputum   Negative: Taking an ACE Inhibitor medicine (e.g., benazepril/LOTENSIN, captopril/CAPOTEN, enalapril/VASOTEC, lisinopril/ZESTRIL)   Negative: Sinus pain or pressure (around cheekbone or eye)   Negative: Nasal discharge and present > 10 days   Negative: Chest or rib pain and only occurs while coughing   Negative: Blood-tinged sputum has been coughed up and more than once   Negative: History of asthma or has mild wheezing   Negative: Exposure to TB (Tuberculosis)    Protocols used: Cough - Chronic-A-OH

## 2024-04-08 NOTE — PROGRESS NOTES
"      Kong Casiano is a 79 year old, presenting for the following health issues:  Cough        4/8/2024     2:08 PM   Additional Questions   Roomed by Paige Armenta     Cough             Started cough in [place] 3 months ago    Phlegm multiple times daily    Tried allergy meds, no help    Got xray, antibiotics, not helping    No change in meds    Clear phlegm    No fever/ chills    No chest pain    Was in [place] 5 weeks, back here since March 2 or so     No itchy/ watery eye     Not much runny nose except cold weather  Some sneezing    Smoked for 20 years, quit decades ago    No acid type symptoms          Objective    /89 (BP Location: Right arm, Patient Position: Chair, Cuff Size: Adult Regular)   Pulse 71   Temp 97.7  F (36.5  C) (Temporal)   Resp 18   Ht 1.648 m (5' 4.88\")   Wt 70.3 kg (155 lb)   SpO2 100%   BMI 25.89 kg/m    Body mass index is 25.89 kg/m .  Physical Exam  Constitutional:       Appearance: He is well-developed.   HENT:      Head: Normocephalic and atraumatic.      Right Ear: Tympanic membrane, ear canal and external ear normal.      Left Ear: Tympanic membrane, ear canal and external ear normal.      Nose: Nose normal.      Mouth/Throat:      Mouth: Mucous membranes are moist.      Pharynx: Oropharynx is clear.   Eyes:      Conjunctiva/sclera: Conjunctivae normal.   Neck:      Vascular: No carotid bruit.   Cardiovascular:      Rate and Rhythm: Normal rate and regular rhythm.      Heart sounds: Normal heart sounds.   Pulmonary:      Effort: Pulmonary effort is normal. No respiratory distress.      Breath sounds: Normal breath sounds.   Neurological:      Mental Status: He is alert and oriented to person, place, and time.      Cranial Nerves: No cranial nerve deficit.   Psychiatric:         Speech: Speech normal.         Behavior: Behavior normal.         No sinus/ submandib tenderness    No chest wall/ back tenderness    No edema    Radials symmetric       Reviewed cxr " images; possible a few Kerle B lines lateral right lung field?    ASSESSMENT / PLAN:  (R06.00) Dyspnea, unspecified type  (primary encounter diagnosis)  Comment: reasonable to make sure heart function okay  Plan: Echocardiogram Complete        Patient can schedule this if symptoms not resolving    (R05.9) Cough, unspecified type  Comment: unclear etiology   Plan: discussed in detail     (J20.9) Acute bronchitis with symptoms > 10 days  Comment: therapeutic trial of short steroid course   Plan: predniSONE (DELTASONE) 10 MG tablet               I reviewed the patient's medications, allergies, medical history, family history, and social history.    Cirilo Garcia MD            Signed Electronically by: Cirilo Garcia MD

## 2024-04-29 ENCOUNTER — LAB (OUTPATIENT)
Dept: LAB | Facility: CLINIC | Age: 79
End: 2024-04-29
Payer: COMMERCIAL

## 2024-04-29 ENCOUNTER — ANTICOAGULATION THERAPY VISIT (OUTPATIENT)
Dept: ANTICOAGULATION | Facility: CLINIC | Age: 79
End: 2024-04-29

## 2024-04-29 DIAGNOSIS — Z79.01 LONG TERM CURRENT USE OF ANTICOAGULANT THERAPY: ICD-10-CM

## 2024-04-29 DIAGNOSIS — D68.9 COAGULATION DEFECT (H): Primary | ICD-10-CM

## 2024-04-29 DIAGNOSIS — D68.51 FACTOR V LEIDEN MUTATION (H): ICD-10-CM

## 2024-04-29 DIAGNOSIS — F51.01 PRIMARY INSOMNIA: ICD-10-CM

## 2024-04-29 DIAGNOSIS — D68.9 COAGULATION DEFECT (H): ICD-10-CM

## 2024-04-29 DIAGNOSIS — F41.9 ANXIETY: ICD-10-CM

## 2024-04-29 LAB — INR BLD: 2.7 (ref 0.9–1.1)

## 2024-04-29 PROCEDURE — 85610 PROTHROMBIN TIME: CPT

## 2024-04-29 PROCEDURE — 36416 COLLJ CAPILLARY BLOOD SPEC: CPT

## 2024-04-29 NOTE — PROGRESS NOTES
ANTICOAGULATION MANAGEMENT     Oh Solares 79 year old male is on warfarin with therapeutic INR result. (Goal INR 2.0-3.0)    Recent labs: (last 7 days)     04/29/24  1114   INR 2.7*       ASSESSMENT     Source(s): Chart Review  Previous INR was Therapeutic last 2(+) visits  Medication, diet, health changes since last INR chart reviewed; none identified         PLAN     Recommended plan for no diet, medication or health factor changes affecting INR     Dosing Instructions: Continue your current warfarin dose with next INR in 6 weeks       Summary  As of 4/29/2024      Full warfarin instructions:  2.5 mg every Mon; 5 mg all other days   Next INR check:  6/10/2024               Detailed voice message left for Oh with dosing instructions and follow up date.     Contact [phone]  to schedule and with any changes, questions or concerns.     Education provided:   Please call back if any changes to your diet, medications or how you've been taking warfarin    Plan made per [facility] anticoagulation protocol    Jaymie Lemon, RN  Anticoagulation Clinic  4/29/2024    _______________________________________________________________________     Anticoagulation Episode Summary       Current INR goal:  2.0-3.0   TTR:  87.7% (1 y)   Target end date:  Indefinite   Send INR reminders to:  [facility]    Indications    Coagulation defect (HCC) [D68.9] [D68.9]  Factor V Leiden mutation (H24) [D68.51]  Long-term (current) use of anticoagulants [Z79.01] [Z79.01]             Comments:               Anticoagulation Care Providers       Provider Role Specialty Phone number    Brendon Kay MD Referring Internal Medicine - Pediatrics [phone]    Karina Galvez PA-C Referring Family Medicine [phone]

## 2024-04-30 RX ORDER — WARFARIN SODIUM 5 MG/1
TABLET ORAL
Qty: 82 TABLET | Refills: 2 | Status: SHIPPED | OUTPATIENT
Start: 2024-04-30

## 2024-04-30 RX ORDER — ZOLPIDEM TARTRATE 10 MG/1
TABLET ORAL
Qty: 31 TABLET | Refills: 0 | Status: SHIPPED | OUTPATIENT
Start: 2024-04-30 | End: 2024-05-29

## 2024-04-30 RX ORDER — ALPRAZOLAM 0.25 MG
TABLET ORAL
Qty: 30 TABLET | Refills: 0 | Status: SHIPPED | OUTPATIENT
Start: 2024-04-30 | End: 2024-05-29

## 2024-05-08 ENCOUNTER — TELEPHONE (OUTPATIENT)
Dept: FAMILY MEDICINE | Facility: CLINIC | Age: 79
End: 2024-05-08
Payer: COMMERCIAL

## 2024-05-08 DIAGNOSIS — M54.50 CHRONIC BILATERAL LOW BACK PAIN WITHOUT SCIATICA: ICD-10-CM

## 2024-05-08 DIAGNOSIS — G89.29 CHRONIC BILATERAL LOW BACK PAIN WITHOUT SCIATICA: ICD-10-CM

## 2024-05-08 DIAGNOSIS — G89.4 CHRONIC PAIN SYNDROME: ICD-10-CM

## 2024-05-08 NOTE — TELEPHONE ENCOUNTER
Medication Question or Refill    Contacts         Type Contact Phone/Fax    05/08/2024 09:53 AM CDT Phone (Incoming) Oh Solares (Self) [phone] (M)            What medication are you calling about (include dose and sig)?: HYDROcodone-acetaminophen (NORCO) 5-325 MG tablet     Preferred Pharmacy:   [place] [address] [place] [facility] [place]  [address]  [address]  [place] [address]  Phone: [phone] Fax: [phone]    Controlled Substance Agreement on file:   CSA -- Patient Level:     [Media Unavailable] Controlled Substance Agreement - Opioid - Scan on 10/17/2022  4:24 PM   [Media Unavailable] Controlled Substance Agreement - Opioid - Scan on 10/5/2021  6:48 AM       Who prescribed the medication?: Dr. Kay    Do you need a refill? Yes    When did you use the medication last? ?    Patient offered an appointment? No    Do you have any questions or concerns?  No      Okay to leave a detailed message?: Yes at Cell number on file:    Telephone Information:   Mobile [phone]

## 2024-05-09 ENCOUNTER — TRANSFERRED RECORDS (OUTPATIENT)
Dept: HEALTH INFORMATION MANAGEMENT | Facility: CLINIC | Age: 79
End: 2024-05-09
Payer: COMMERCIAL

## 2024-05-09 RX ORDER — HYDROCODONE BITARTRATE AND ACETAMINOPHEN 5; 325 MG/1; MG/1
1 TABLET ORAL EVERY 6 HOURS PRN
Qty: 30 TABLET | Refills: 0 | Status: SHIPPED | OUTPATIENT
Start: 2024-05-09 | End: 2024-07-15

## 2024-05-29 DIAGNOSIS — F41.9 ANXIETY: ICD-10-CM

## 2024-05-29 DIAGNOSIS — F51.01 PRIMARY INSOMNIA: ICD-10-CM

## 2024-05-29 RX ORDER — ALPRAZOLAM 0.25 MG
TABLET ORAL
Qty: 30 TABLET | Refills: 0 | Status: SHIPPED | OUTPATIENT
Start: 2024-05-29 | End: 2024-07-05

## 2024-05-29 RX ORDER — ZOLPIDEM TARTRATE 10 MG/1
TABLET ORAL
Qty: 31 TABLET | Refills: 0 | Status: SHIPPED | OUTPATIENT
Start: 2024-05-29 | End: 2024-06-27

## 2024-06-10 ENCOUNTER — ANTICOAGULATION THERAPY VISIT (OUTPATIENT)
Dept: ANTICOAGULATION | Facility: CLINIC | Age: 79
End: 2024-06-10

## 2024-06-10 ENCOUNTER — LAB (OUTPATIENT)
Dept: LAB | Facility: CLINIC | Age: 79
End: 2024-06-10
Payer: COMMERCIAL

## 2024-06-10 DIAGNOSIS — D68.9 COAGULATION DEFECT (H): ICD-10-CM

## 2024-06-10 DIAGNOSIS — D68.51 FACTOR V LEIDEN MUTATION (H): ICD-10-CM

## 2024-06-10 DIAGNOSIS — Z79.01 LONG TERM CURRENT USE OF ANTICOAGULANT THERAPY: ICD-10-CM

## 2024-06-10 DIAGNOSIS — D68.9 COAGULATION DEFECT (H): Primary | ICD-10-CM

## 2024-06-10 LAB — INR BLD: 2.1 (ref 0.9–1.1)

## 2024-06-10 PROCEDURE — 85610 PROTHROMBIN TIME: CPT

## 2024-06-10 PROCEDURE — 36416 COLLJ CAPILLARY BLOOD SPEC: CPT

## 2024-06-10 NOTE — PROGRESS NOTES
ANTICOAGULATION MANAGEMENT     Oh Solares 79 year old male is on warfarin with therapeutic INR result. (Goal INR 2.0-3.0)    Recent labs: (last 7 days)     06/10/24  1031   INR 2.1*       ASSESSMENT     Source(s): Chart Review and Patient/Caregiver Call     Warfarin doses taken: Warfarin taken as instructed  Diet: No new diet changes identified  Medication/supplement changes: None noted  New illness, injury, or hospitalization: No  Signs or symptoms of bleeding or clotting: No  Previous result: Therapeutic last 2(+) visits  Additional findings: None       PLAN     Recommended plan for no diet, medication or health factor changes affecting INR     Dosing Instructions: Continue your current warfarin dose with next INR in 6 weeks       Summary  As of 6/10/2024      Full warfarin instructions:  2.5 mg every Mon; 5 mg all other days   Next INR check:  7/22/2024               Telephone call with Oh who verbalizes understanding and agrees to plan    Lab visit scheduled    Education provided:   Contact [phone]  with any changes, questions or concerns.     Plan made per [facility] anticoagulation protocol    Jaymie Lemon RN  Anticoagulation Clinic  6/10/2024    _______________________________________________________________________     Anticoagulation Episode Summary       Current INR goal:  2.0-3.0   TTR:  92.0% (1 y)   Target end date:  Indefinite   Send INR reminders to:  [facility]    Indications    Coagulation defect (HCC) [D68.9] [D68.9]  Factor V Leiden mutation (H24) [D68.51]  Long-term (current) use of anticoagulants [Z79.01] [Z79.01]             Comments:               Anticoagulation Care Providers       Provider Role Specialty Phone number    Brendon Kay MD Referring Internal Medicine - Pediatrics [phone]    Karina Galvez PA-C Referring Family Medicine [phone]

## 2024-06-24 ENCOUNTER — ANCILLARY PROCEDURE (OUTPATIENT)
Dept: CARDIOLOGY | Facility: CLINIC | Age: 79
End: 2024-06-24
Attending: FAMILY MEDICINE
Payer: COMMERCIAL

## 2024-06-24 DIAGNOSIS — R06.00 DYSPNEA, UNSPECIFIED TYPE: ICD-10-CM

## 2024-06-24 LAB — LVEF ECHO: NORMAL

## 2024-06-24 PROCEDURE — 93306 TTE W/DOPPLER COMPLETE: CPT | Performed by: INTERNAL MEDICINE

## 2024-06-24 NOTE — LETTER
2024    Arianna Solares  [address]  [place] [address]          Dear ,    We are writing to inform you of your test results.    These are overall reassuring results. No cause for shortness of breath was seen.       Resulted Orders   Echocardiogram Complete   Result Value Ref Range    LVEF  60-65%     Narrative    [id number]  [id number]  [id number]  [id number]^SYDNEE^JOCELINE^JORGE     [facility], Echocardiography Laboratory  [address] [place] [address]     Name: ARIANNA SOLARES  MRN: [id number]  : 1945  Study Date: 2024 12:39 PM  Age: 79 yrs  Gender: Male  Patient Location: [facility]  Reason For Study: Dyspnea, unspecified type  Ordering Physician: JOCELINE RANDHAWA  Referring Physician: JOCELINE RANDHAWA  Performed By: Yesy Moore RDCS     BSA: 1.8 m2  Height: 65 in  Weight: 155 lb  BP: 125/89 mmHg  ______________________________________________________________________________  Procedure  Echocardiogram with two-dimensional, color and spectral Doppler performed.  ______________________________________________________________________________  Interpretation Summary     Global and regional left ventricular function is normal with an EF of 60-65%.  Right ventricular function, chamber size, wall motion, and thickness are  normal.  Left ventricular diastolic function is normal. Diastolic Doppler findings  (E/E' ratio and/or other parameters) suggest left ventricular filling  pressures are normal.  Mild aortic insufficiency is present.  Previous study not available for comparison.  ______________________________________________________________________________  Left Ventricle  Global and regional left ventricular function is normal with an EF of 60-65%.  Left ventricular size is normal. Relative wall thickness is increased  consistent with concentric remodeling. Left ventricular diastolic function is  normal. Diastolic Doppler findings (E/E' ratio  and/or other parameters)  suggest left ventricular filling pressures are normal.     Right Ventricle  Right ventricular function, chamber size, wall motion, and thickness are  normal.     Atria  Both atria appear normal. The atrial septum is intact as assessed by color  Doppler .     Mitral Valve  Mild mitral annular calcification is present. Trace mitral insufficiency is  present.     Aortic Valve  The aortic valve is tricuspid. Mild aortic insufficiency is present.     Tricuspid Valve  The tricuspid valve is normal.     Pulmonic Valve  The pulmonic valve is normal. Trace pulmonic insufficiency is present.     Vessels  The aorta root is normal. The thoracic aorta is normal. The inferior vena cava  was normal in size with preserved respiratory variability. IVC diameter <2.1  cm collapsing >50% with sniff suggests a normal RA pressure of 3 mmHg.     Pericardium  No pericardial effusion is present.     Compared to Previous Study  Previous study not available for comparison.  ______________________________________________________________________________  MMode/2D Measurements & Calculations     IVSd: 1.1 cm  LVIDd: 4.2 cm  LVIDs: 3.0 cm  LVPWd: 0.92 cm  FS: 28.1 %  LV mass(C)d: 136.0 grams  LV mass(C)dI: 76.6 grams/m2  Ao root diam: 3.0 cm  asc Aorta Diam: 3.1 cm  LVOT diam: 1.8 cm  LVOT area: 2.4 cm2  Ao root diam index Ht(cm/m): 1.8  Ao root diam index BSA (cm/m2): 1.7  Asc Ao diam index BSA (cm/m2): 1.8  Asc Ao diam index Ht(cm/m): 1.9  LA Volume (BP): 26.7 ml     LA Volume Index (BP): 15.0 ml/m2  RWT: 0.44  TAPSE: 1.8 cm     Doppler Measurements & Calculations  MV E max hetal: 99.0 cm/sec  MV A max hetal: 101.1 cm/sec  MV E/A: 0.98  MV dec slope: 540.6 cm/sec2  MV dec time: 0.18 sec  E/E' av.6  Lateral E/e': 12.8  Medial E/e': 12.3     ______________________________________________________________________________  Report approved by: Poonam Yo 2024 03:32 PM             If you have any questions  or concerns, please call the clinic at the number listed above.       Sincerely,      Chante Parson MD

## 2024-06-24 NOTE — RESULT ENCOUNTER NOTE
These are overall reassuring results. No cause for shortness of breath was seen.     Chante Parson MD

## 2024-06-27 DIAGNOSIS — F51.01 PRIMARY INSOMNIA: ICD-10-CM

## 2024-06-27 RX ORDER — ZOLPIDEM TARTRATE 10 MG/1
TABLET ORAL
Qty: 31 TABLET | Refills: 0 | Status: SHIPPED | OUTPATIENT
Start: 2024-06-27 | End: 2024-08-01

## 2024-07-05 DIAGNOSIS — F41.9 ANXIETY: ICD-10-CM

## 2024-07-05 RX ORDER — ALPRAZOLAM 0.25 MG
TABLET ORAL
Qty: 30 TABLET | Refills: 0 | Status: SHIPPED | OUTPATIENT
Start: 2024-07-05 | End: 2024-08-19

## 2024-07-15 DIAGNOSIS — M54.50 CHRONIC BILATERAL LOW BACK PAIN WITHOUT SCIATICA: ICD-10-CM

## 2024-07-15 DIAGNOSIS — G89.29 CHRONIC BILATERAL LOW BACK PAIN WITHOUT SCIATICA: ICD-10-CM

## 2024-07-15 DIAGNOSIS — G89.4 CHRONIC PAIN SYNDROME: ICD-10-CM

## 2024-07-15 RX ORDER — HYDROCODONE BITARTRATE AND ACETAMINOPHEN 5; 325 MG/1; MG/1
1 TABLET ORAL EVERY 6 HOURS PRN
Qty: 30 TABLET | Refills: 0 | Status: SHIPPED | OUTPATIENT
Start: 2024-07-15 | End: 2024-08-22

## 2024-07-22 ENCOUNTER — LAB (OUTPATIENT)
Dept: LAB | Facility: CLINIC | Age: 79
End: 2024-07-22
Payer: COMMERCIAL

## 2024-07-22 ENCOUNTER — DOCUMENTATION ONLY (OUTPATIENT)
Dept: ANTICOAGULATION | Facility: CLINIC | Age: 79
End: 2024-07-22

## 2024-07-22 ENCOUNTER — ANTICOAGULATION THERAPY VISIT (OUTPATIENT)
Dept: ANTICOAGULATION | Facility: CLINIC | Age: 79
End: 2024-07-22

## 2024-07-22 DIAGNOSIS — D68.9 COAGULATION DEFECT (H): Primary | ICD-10-CM

## 2024-07-22 DIAGNOSIS — D68.51 FACTOR V LEIDEN MUTATION (H): ICD-10-CM

## 2024-07-22 DIAGNOSIS — D68.9 COAGULATION DEFECT (H): ICD-10-CM

## 2024-07-22 DIAGNOSIS — Z79.01 LONG TERM CURRENT USE OF ANTICOAGULANT THERAPY: ICD-10-CM

## 2024-07-22 LAB — INR BLD: 2.4 (ref 0.9–1.1)

## 2024-07-22 PROCEDURE — 85610 PROTHROMBIN TIME: CPT

## 2024-07-22 PROCEDURE — 36416 COLLJ CAPILLARY BLOOD SPEC: CPT

## 2024-07-22 NOTE — CONFIDENTIAL NOTE
ANTICOAGULATION CLINIC REFERRAL RENEWAL REQUEST       An annual renewal order is required for all patients referred to [place].?  Please review and sign the pended referral order for Oh Solares.       ANTICOAGULATION SUMMARY      Warfarin indication(s)   Coagulation defect  Factor V Leiden mutation     Mechanical heart valve present?  NO       Current goal range   INR: 2.0-3.0     Goal appropriate for indication? Goal INR 2-3, standard for indication(s) above     Time in Therapeutic Range (TTR)  (Goal > 60%) 92%       Office visit with referring provider's group within last year yes on 4/8/24       Jing Fermin RN  [place]

## 2024-07-22 NOTE — PROGRESS NOTES
ANTICOAGULATION MANAGEMENT     Oh Solares 79 year old male is on warfarin with therapeutic INR result. (Goal INR 2.0-3.0)    Recent labs: (last 7 days)     07/22/24  1028   INR 2.4*       ASSESSMENT     Source(s): Chart Review and Patient/Caregiver Call     Warfarin doses taken: Warfarin taken as instructed  Diet: No new diet changes identified  Medication/supplement changes: None noted  New illness, injury, or hospitalization: No  Signs or symptoms of bleeding or clotting: No  Previous result: Therapeutic last 2(+) visits  Additional findings: None     PLAN     Recommended plan for no diet, medication or health factor changes affecting INR     Dosing Instructions: Continue your current warfarin dose with next INR in 6 weeks       Summary  As of 7/22/2024      Full warfarin instructions:  2.5 mg every Mon; 5 mg all other days   Next INR check:  8/29/2024               Telephone call with Oh who verbalizes understanding and agrees to plan    Lab visit scheduled    Education provided: Please call back if any changes to your diet, medications or how you've been taking warfarin    Plan made per [facility] anticoagulation protocol    Jing Fermin RN  Anticoagulation Clinic  7/22/2024    _______________________________________________________________________     Anticoagulation Episode Summary       Current INR goal:  2.0-3.0   TTR:  92.0% (1 y)   Target end date:  Indefinite   Send INR reminders to:  [facility]    Indications    Coagulation defect (HCC) [D68.9] [D68.9]  Factor V Leiden mutation (H24) [D68.51]  Long-term (current) use of anticoagulants [Z79.01] [Z79.01]             Comments:               Anticoagulation Care Providers       Provider Role Specialty Phone number    Brendon Kay MD Referring Internal Medicine - Pediatrics [phone]    Karina Galvez PA-C Referring Family Medicine [phone]

## 2024-07-31 DIAGNOSIS — F51.01 PRIMARY INSOMNIA: ICD-10-CM

## 2024-08-01 RX ORDER — ZOLPIDEM TARTRATE 10 MG/1
TABLET ORAL
Qty: 31 TABLET | Refills: 0 | Status: SHIPPED | OUTPATIENT
Start: 2024-08-01 | End: 2024-09-03

## 2024-08-18 DIAGNOSIS — F41.9 ANXIETY: ICD-10-CM

## 2024-08-19 RX ORDER — ALPRAZOLAM 0.25 MG
TABLET ORAL
Qty: 30 TABLET | Refills: 0 | Status: SHIPPED | OUTPATIENT
Start: 2024-08-19 | End: 2024-09-23

## 2024-08-22 ENCOUNTER — LAB (OUTPATIENT)
Dept: LAB | Facility: CLINIC | Age: 79
End: 2024-08-22
Payer: COMMERCIAL

## 2024-08-22 ENCOUNTER — ANTICOAGULATION THERAPY VISIT (OUTPATIENT)
Dept: ANTICOAGULATION | Facility: CLINIC | Age: 79
End: 2024-08-22

## 2024-08-22 DIAGNOSIS — Z79.01 LONG TERM CURRENT USE OF ANTICOAGULANT THERAPY: ICD-10-CM

## 2024-08-22 DIAGNOSIS — G89.4 CHRONIC PAIN SYNDROME: ICD-10-CM

## 2024-08-22 DIAGNOSIS — D68.51 FACTOR V LEIDEN MUTATION (H): ICD-10-CM

## 2024-08-22 DIAGNOSIS — D68.9 COAGULATION DEFECT (H): Primary | ICD-10-CM

## 2024-08-22 DIAGNOSIS — G89.29 CHRONIC BILATERAL LOW BACK PAIN WITHOUT SCIATICA: ICD-10-CM

## 2024-08-22 DIAGNOSIS — M54.50 CHRONIC BILATERAL LOW BACK PAIN WITHOUT SCIATICA: ICD-10-CM

## 2024-08-22 DIAGNOSIS — D68.9 COAGULATION DEFECT (H): ICD-10-CM

## 2024-08-22 LAB — INR BLD: 2 (ref 0.9–1.1)

## 2024-08-22 PROCEDURE — 36416 COLLJ CAPILLARY BLOOD SPEC: CPT

## 2024-08-22 PROCEDURE — 85610 PROTHROMBIN TIME: CPT

## 2024-08-22 NOTE — PROGRESS NOTES
ANTICOAGULATION MANAGEMENT     Oh Solares 79 year old male is on warfarin with therapeutic INR result. (Goal INR 2.0-3.0)    Recent labs: (last 7 days)     08/22/24  1230   INR 2.0*       ASSESSMENT     Source(s): Chart Review  Previous INR was Therapeutic last 2(+) visits  Medication, diet, health changes since last INR chart reviewed; none identified         PLAN     Recommended plan for no diet, medication or health factor changes affecting INR     Dosing Instructions: Continue your current warfarin dose with next INR in 6 weeks       Summary  As of 8/22/2024      Full warfarin instructions:  2.5 mg every Mon; 5 mg all other days   Next INR check:  10/3/2024               Detailed voice message left for Oh with dosing instructions and follow up date.     Contact [phone] to schedule and with any changes, questions or concerns.     Education provided: Please call back if any changes to your diet, medications or how you've been taking warfarin  Goal range and lab monitoring: goal range and significance of current result    Plan made per [facility] anticoagulation protocol    Danay Pradhan RN  Anticoagulation Clinic  8/22/2024    _______________________________________________________________________     Anticoagulation Episode Summary       Current INR goal:  2.0-3.0   TTR:  92.0% (1 y)   Target end date:  Indefinite   Send INR reminders to:  [facility]    Indications    Coagulation defect (HCC) [D68.9] [D68.9]  Factor V Leiden mutation (H24) [D68.51]  Long-term (current) use of anticoagulants [Z79.01] [Z79.01]             Comments:               Anticoagulation Care Providers       Provider Role Specialty Phone number    Brendon Kay MD Referring Internal Medicine - Pediatrics [phone]    Karina Galvez PA-C Referring Family Medicine [phone]

## 2024-08-23 RX ORDER — HYDROCODONE BITARTRATE AND ACETAMINOPHEN 5; 325 MG/1; MG/1
1 TABLET ORAL EVERY 6 HOURS PRN
Qty: 30 TABLET | Refills: 0 | Status: SHIPPED | OUTPATIENT
Start: 2024-08-23

## 2024-08-31 DIAGNOSIS — F51.01 PRIMARY INSOMNIA: ICD-10-CM

## 2024-09-03 RX ORDER — ZOLPIDEM TARTRATE 10 MG/1
TABLET ORAL
Qty: 31 TABLET | Refills: 0 | Status: SHIPPED | OUTPATIENT
Start: 2024-09-03 | End: 2024-10-03

## 2024-09-23 DIAGNOSIS — F41.9 ANXIETY: ICD-10-CM

## 2024-09-23 RX ORDER — ALPRAZOLAM 0.25 MG
TABLET ORAL
Qty: 30 TABLET | Refills: 0 | Status: SHIPPED | OUTPATIENT
Start: 2024-09-23

## 2024-10-02 DIAGNOSIS — F51.01 PRIMARY INSOMNIA: ICD-10-CM

## 2024-10-03 ENCOUNTER — LAB (OUTPATIENT)
Dept: LAB | Facility: CLINIC | Age: 79
End: 2024-10-03
Payer: COMMERCIAL

## 2024-10-03 ENCOUNTER — ANTICOAGULATION THERAPY VISIT (OUTPATIENT)
Dept: ANTICOAGULATION | Facility: CLINIC | Age: 79
End: 2024-10-03

## 2024-10-03 DIAGNOSIS — Z79.01 LONG TERM CURRENT USE OF ANTICOAGULANT THERAPY: ICD-10-CM

## 2024-10-03 DIAGNOSIS — D68.9 COAGULATION DEFECT (H): Primary | ICD-10-CM

## 2024-10-03 DIAGNOSIS — D68.9 COAGULATION DEFECT (H): ICD-10-CM

## 2024-10-03 DIAGNOSIS — D68.51 FACTOR V LEIDEN MUTATION (H): ICD-10-CM

## 2024-10-03 LAB — INR BLD: 2.4 (ref 0.9–1.1)

## 2024-10-03 PROCEDURE — 85610 PROTHROMBIN TIME: CPT

## 2024-10-03 PROCEDURE — 36416 COLLJ CAPILLARY BLOOD SPEC: CPT

## 2024-10-03 RX ORDER — ZOLPIDEM TARTRATE 10 MG/1
TABLET ORAL
Qty: 31 TABLET | Refills: 0 | Status: SHIPPED | OUTPATIENT
Start: 2024-10-03 | End: 2024-11-04

## 2024-10-03 NOTE — PROGRESS NOTES
ANTICOAGULATION MANAGEMENT     Oh Solares 79 year old male is on warfarin with therapeutic INR result. (Goal INR 2.0-3.0)    Recent labs: (last 7 days)     10/03/24  0749   INR 2.4*       ASSESSMENT     Source(s): Chart Review and Patient/Caregiver Call     Warfarin doses taken: Warfarin taken as instructed  Diet: No new diet changes identified  Medication/supplement changes: None noted  New illness, injury, or hospitalization: No  Signs or symptoms of bleeding or clotting: No  Previous result: Therapeutic last 2(+) visits  Additional findings: None       PLAN     Recommended plan for no diet, medication or health factor changes affecting INR     Dosing Instructions: Continue your current warfarin dose with next INR in 6 weeks       Summary  As of 10/3/2024      Full warfarin instructions:  2.5 mg every Mon; 5 mg all other days   Next INR check:  11/14/2024               Telephone call with Oh who verbalizes understanding and agrees to plan    Lab visit scheduled    Education provided: Contact [phone] with any changes, questions or concerns.     Plan made per [facility] anticoagulation protocol    Jaymie Lemon RN  10/3/2024  Anticoagulation Clinic  [employer] for routing messages: karthik [facility]  [facility] patient phone line: [phone]        _______________________________________________________________________     Anticoagulation Episode Summary       Current INR goal:  2.0-3.0   TTR:  92.0% (1 y)   Target end date:  Indefinite   Send INR reminders to:  [facility]    Indications    Coagulation defect (HCC) [D68.9] [D68.9]  Factor V Leiden mutation (H) [D68.51]  Long-term (current) use of anticoagulants [Z79.01] [Z79.01]             Comments:               Anticoagulation Care Providers       Provider Role Specialty Phone number    Brendon Kay MD Referring Internal Medicine - Pediatrics [phone]    Karina Galvez PA-C Referring Family Medicine [phone]

## 2024-10-10 DIAGNOSIS — G89.4 CHRONIC PAIN SYNDROME: ICD-10-CM

## 2024-10-10 DIAGNOSIS — G89.29 CHRONIC BILATERAL LOW BACK PAIN WITHOUT SCIATICA: ICD-10-CM

## 2024-10-10 DIAGNOSIS — M54.50 CHRONIC BILATERAL LOW BACK PAIN WITHOUT SCIATICA: ICD-10-CM

## 2024-10-10 NOTE — TELEPHONE ENCOUNTER
Medication Question or Refill    Contacts       Contact Date/Time Type Contact Phone/Fax    10/10/2024 06:00 PM CDT Phone (Incoming) Oh Solares (Self) [phone] (H)            What medication are you calling about (include dose and sig)?: hydrocodone 325 mg     Preferred Pharmacy:   [place] [id number] [place] [facility] [place] - [address]  [address]  [place] [address]  Phone: [phone] Fax: [phone]      Controlled Substance Agreement on file:   CSA -- Patient Level:     [Media Unavailable] Controlled Substance Agreement - Opioid - Scan on 10/17/2022  4:24 PM   [Media Unavailable] Controlled Substance Agreement - Opioid - Scan on 10/5/2021  6:48 AM       Who prescribed the medication?: mahendra    Do you need a refill? Yes    When did you use the medication last? 2 months ago    Patient offered an appointment? No    Do you have any questions or concerns?  No      Okay to leave a detailed message?: Yes at Cell number on file:    Telephone Information:   Mobile [phone]

## 2024-10-11 RX ORDER — HYDROCODONE BITARTRATE AND ACETAMINOPHEN 5; 325 MG/1; MG/1
1 TABLET ORAL EVERY 6 HOURS PRN
Qty: 30 TABLET | Refills: 0 | Status: SHIPPED | OUTPATIENT
Start: 2024-10-11

## 2024-10-21 ENCOUNTER — PATIENT OUTREACH (OUTPATIENT)
Dept: CARE COORDINATION | Facility: CLINIC | Age: 79
End: 2024-10-21
Payer: COMMERCIAL

## 2024-10-24 DIAGNOSIS — F41.9 ANXIETY: ICD-10-CM

## 2024-10-24 RX ORDER — ALPRAZOLAM 0.25 MG/1
TABLET ORAL
Qty: 30 TABLET | Refills: 0 | Status: SHIPPED | OUTPATIENT
Start: 2024-10-24

## 2024-11-03 DIAGNOSIS — F51.01 PRIMARY INSOMNIA: ICD-10-CM

## 2024-11-04 RX ORDER — ZOLPIDEM TARTRATE 10 MG/1
TABLET ORAL
Qty: 31 TABLET | Refills: 0 | Status: SHIPPED | OUTPATIENT
Start: 2024-11-04

## 2024-11-14 ENCOUNTER — LAB (OUTPATIENT)
Dept: LAB | Facility: CLINIC | Age: 79
End: 2024-11-14
Payer: COMMERCIAL

## 2024-11-14 ENCOUNTER — ANTICOAGULATION THERAPY VISIT (OUTPATIENT)
Dept: ANTICOAGULATION | Facility: CLINIC | Age: 79
End: 2024-11-14

## 2024-11-14 DIAGNOSIS — Z79.01 LONG TERM CURRENT USE OF ANTICOAGULANT THERAPY: ICD-10-CM

## 2024-11-14 DIAGNOSIS — D68.51 FACTOR V LEIDEN MUTATION (H): ICD-10-CM

## 2024-11-14 DIAGNOSIS — D68.9 COAGULATION DEFECT (H): ICD-10-CM

## 2024-11-14 DIAGNOSIS — D68.9 COAGULATION DEFECT (H): Primary | ICD-10-CM

## 2024-11-14 LAB — INR BLD: 2.3 (ref 0.9–1.1)

## 2024-11-14 PROCEDURE — 36416 COLLJ CAPILLARY BLOOD SPEC: CPT

## 2024-11-14 PROCEDURE — 85610 PROTHROMBIN TIME: CPT

## 2024-11-14 NOTE — PROGRESS NOTES
ANTICOAGULATION MANAGEMENT     Oh Solares 79 year old male is on warfarin with therapeutic INR result. (Goal INR 2.0-3.0)    Recent labs: (last 7 days)     11/14/24  0924   INR 2.3*       ASSESSMENT     Source(s): Chart Review  Previous INR was Therapeutic last 2(+) visits  Medication, diet, health changes since last INR chart reviewed; none identified         PLAN     Recommended plan for no diet, medication or health factor changes affecting INR     Dosing Instructions: Continue your current warfarin dose with next INR in 6 weeks       Summary  As of 11/14/2024      Full warfarin instructions:  2.5 mg every Mon; 5 mg all other days   Next INR check:  12/26/2024               Detailed voice message left for Oh with dosing instructions and follow up date.     Contact [phone] to schedule and with any changes, questions or concerns.     Education provided: Please call back if any changes to your diet, medications or how you've been taking warfarin  Goal range and lab monitoring: goal range and significance of current result    Plan made per [facility] anticoagulation protocol    Danay Pradhan RN  11/14/2024  Anticoagulation Clinic  [employer]att for routing messages: karthik [facility]  [facility] patient phone line: [phone]        _______________________________________________________________________     Anticoagulation Episode Summary       Current INR goal:  2.0-3.0   TTR:  95.2% (1 y)   Target end date:  Indefinite   Send INR reminders to:  [facility]    Indications    Coagulation defect (HCC) [D68.9] [D68.9]  Factor V Leiden mutation (H) [D68.51]  Long-term (current) use of anticoagulants [Z79.01] [Z79.01]             Comments:  --             Anticoagulation Care Providers       Provider Role Specialty Phone number    Brendon Kay MD Referring Internal Medicine - Pediatrics [phone]    Karina Galvez PA-C Referring Family Medicine [phone]

## 2024-11-21 DIAGNOSIS — G89.29 CHRONIC BILATERAL LOW BACK PAIN WITHOUT SCIATICA: ICD-10-CM

## 2024-11-21 DIAGNOSIS — M54.50 CHRONIC BILATERAL LOW BACK PAIN WITHOUT SCIATICA: ICD-10-CM

## 2024-11-21 DIAGNOSIS — G89.4 CHRONIC PAIN SYNDROME: ICD-10-CM

## 2024-11-21 NOTE — TELEPHONE ENCOUNTER
Medication Question or Refill    Contacts       Contact Date/Time Type Contact Phone/Fax    11/21/2024 02:26 PM CST Phone (Incoming) Oh Solares (Self)             What medication are you calling about (include dose and sig)?: HYDROcodone-acetaminophen (NORCO) 5-325 MG tablet     Preferred Pharmacy:   [place] [id number] [place] [facility] [place] - [address]  [address]  [place] [address]  Phone: [phone] Fax: [phone]    Controlled Substance Agreement on file:   CSA -- Patient Level:     [Media Unavailable] Controlled Substance Agreement - Opioid - Scan on 10/17/2022  4:24 PM   [Media Unavailable] Controlled Substance Agreement - Opioid - Scan on 10/5/2021  6:48 AM       Who prescribed the medication?: Dr. Kay    Do you need a refill? Yes    When did you use the medication last? yesterday    Patient offered an appointment? No    Do you have any questions or concerns?  No    Okay to leave a detailed message?: Yes at Home number on file [phone] (home)    Geovanna Murillo,

## 2024-11-25 RX ORDER — HYDROCODONE BITARTRATE AND ACETAMINOPHEN 5; 325 MG/1; MG/1
1 TABLET ORAL EVERY 6 HOURS PRN
Qty: 30 TABLET | Refills: 0 | Status: SHIPPED | OUTPATIENT
Start: 2024-11-25

## 2024-11-26 DIAGNOSIS — F41.9 ANXIETY: ICD-10-CM

## 2024-11-26 DIAGNOSIS — Z13.6 CARDIOVASCULAR SCREENING; LDL GOAL LESS THAN 160: ICD-10-CM

## 2024-11-27 RX ORDER — ATORVASTATIN CALCIUM 10 MG/1
10 TABLET, FILM COATED ORAL DAILY
Qty: 90 TABLET | Refills: 4 | OUTPATIENT
Start: 2024-11-27

## 2024-11-27 RX ORDER — ALPRAZOLAM 0.25 MG/1
TABLET ORAL
Qty: 30 TABLET | Refills: 0 | Status: SHIPPED | OUTPATIENT
Start: 2024-11-27

## 2024-12-01 DIAGNOSIS — F51.01 PRIMARY INSOMNIA: ICD-10-CM

## 2024-12-02 RX ORDER — ZOLPIDEM TARTRATE 10 MG/1
TABLET ORAL
Qty: 31 TABLET | Refills: 0 | Status: SHIPPED | OUTPATIENT
Start: 2024-12-02

## 2024-12-26 ENCOUNTER — LAB (OUTPATIENT)
Dept: LAB | Facility: CLINIC | Age: 79
End: 2024-12-26
Payer: COMMERCIAL

## 2024-12-26 ENCOUNTER — ANTICOAGULATION THERAPY VISIT (OUTPATIENT)
Dept: ANTICOAGULATION | Facility: CLINIC | Age: 79
End: 2024-12-26

## 2024-12-26 DIAGNOSIS — D68.9 COAGULATION DEFECT (H): Primary | ICD-10-CM

## 2024-12-26 DIAGNOSIS — D68.9 COAGULATION DEFECT (H): ICD-10-CM

## 2024-12-26 DIAGNOSIS — D68.51 FACTOR V LEIDEN MUTATION (H): ICD-10-CM

## 2024-12-26 DIAGNOSIS — Z79.01 LONG TERM CURRENT USE OF ANTICOAGULANT THERAPY: ICD-10-CM

## 2024-12-26 LAB — INR BLD: 2 (ref 0.9–1.1)

## 2024-12-26 NOTE — PROGRESS NOTES
ANTICOAGULATION MANAGEMENT     Oh Madridkailajuanjo 79 year old male is on warfarin with therapeutic INR result. (Goal INR 2.0-3.0)    Recent labs: (last 7 days)     12/26/24  1029   INR 2.0*       ASSESSMENT     Source(s): Chart Review and Patient/Caregiver Call     Warfarin doses taken: Warfarin taken as instructed  Diet: No new diet changes identified - possible he had more greens (vitamin K) - will resume normal intake of vitamin K  Medication/supplement changes: None noted  New illness, injury, or hospitalization: No  Signs or symptoms of bleeding or clotting: No  Previous result: Therapeutic last 2(+) visits  Additional findings:  Upcoming travel in Feb (will check INR prior to travel)     PLAN     Recommended plan for temporary change(s) affecting INR     Dosing Instructions: Continue your current warfarin dose with next INR in 6 weeks       Summary  As of 12/26/2024      Full warfarin instructions:  2.5 mg every Mon; 5 mg all other days   Next INR check:  1/29/2025               Telephone call with Oh who verbalizes understanding and agrees to plan    Lab visit scheduled    Education provided: Please call back if any changes to your diet, medications or how you've been taking warfarin    Plan made per [facility] anticoagulation protocol    Jing Fermin RN  12/26/2024  Anticoagulation Clinic  [employer] for routing messages: karthik MOY  [facility] patient phone line: [phone]        _______________________________________________________________________     Anticoagulation Episode Summary       Current INR goal:  2.0-3.0   TTR:  95.3% (1 y)   Target end date:  Indefinite   Send INR reminders to:  JESSICA MOY    Indications    Coagulation defect (HCC) [D68.9] [D68.9]  Factor V Leiden mutation (H) [D68.51]  Long-term (current) use of anticoagulants [Z79.01] [Z79.01]             Comments:  --             Anticoagulation Care Providers       Provider Role Specialty Phone number    Brendon Kay MD  Referring Internal Medicine - Pediatrics [phone]    Karina Galvez PA-C Referring Family Medicine [phone]

## 2024-12-30 DIAGNOSIS — Z13.6 CARDIOVASCULAR SCREENING; LDL GOAL LESS THAN 160: ICD-10-CM

## 2024-12-30 RX ORDER — ATORVASTATIN CALCIUM 10 MG/1
10 TABLET, FILM COATED ORAL DAILY
Qty: 90 TABLET | Refills: 4 | Status: SHIPPED | OUTPATIENT
Start: 2024-12-30

## 2025-01-05 DIAGNOSIS — F41.9 ANXIETY: ICD-10-CM

## 2025-01-06 ENCOUNTER — LAB (OUTPATIENT)
Dept: LAB | Facility: CLINIC | Age: 80
End: 2025-01-06
Payer: COMMERCIAL

## 2025-01-06 DIAGNOSIS — Z79.01 LONG TERM CURRENT USE OF ANTICOAGULANT THERAPY: ICD-10-CM

## 2025-01-06 DIAGNOSIS — D68.9 COAGULATION DEFECT: ICD-10-CM

## 2025-01-06 DIAGNOSIS — D68.51 FACTOR V LEIDEN MUTATION: ICD-10-CM

## 2025-01-06 LAB
CHOLEST SERPL-MCNC: 181 MG/DL
FASTING STATUS PATIENT QL REPORTED: YES
HDLC SERPL-MCNC: 88 MG/DL
LDLC SERPL CALC-MCNC: 71 MG/DL
NONHDLC SERPL-MCNC: 93 MG/DL
TRIGL SERPL-MCNC: 112 MG/DL

## 2025-01-06 PROCEDURE — 80061 LIPID PANEL: CPT

## 2025-01-06 PROCEDURE — 36415 COLL VENOUS BLD VENIPUNCTURE: CPT

## 2025-01-06 RX ORDER — ALPRAZOLAM 0.25 MG/1
TABLET ORAL
Qty: 30 TABLET | Refills: 0 | Status: SHIPPED | OUTPATIENT
Start: 2025-01-06

## 2025-01-13 ENCOUNTER — OFFICE VISIT (OUTPATIENT)
Dept: FAMILY MEDICINE | Facility: CLINIC | Age: 80
End: 2025-01-13
Payer: COMMERCIAL

## 2025-01-13 VITALS
TEMPERATURE: 97.9 F | WEIGHT: 151 LBS | RESPIRATION RATE: 15 BRPM | HEART RATE: 68 BPM | DIASTOLIC BLOOD PRESSURE: 82 MMHG | OXYGEN SATURATION: 100 % | HEIGHT: 64 IN | BODY MASS INDEX: 25.78 KG/M2 | SYSTOLIC BLOOD PRESSURE: 133 MMHG

## 2025-01-13 DIAGNOSIS — M54.50 CHRONIC BILATERAL LOW BACK PAIN WITHOUT SCIATICA: ICD-10-CM

## 2025-01-13 DIAGNOSIS — G89.4 CHRONIC PAIN SYNDROME: ICD-10-CM

## 2025-01-13 DIAGNOSIS — Z00.00 ENCOUNTER FOR MEDICARE ANNUAL WELLNESS EXAM: Primary | ICD-10-CM

## 2025-01-13 DIAGNOSIS — G89.29 CHRONIC BILATERAL LOW BACK PAIN WITHOUT SCIATICA: ICD-10-CM

## 2025-01-13 LAB — CREAT UR-MCNC: 85 MG/DL

## 2025-01-13 RX ORDER — HYDROCODONE BITARTRATE AND ACETAMINOPHEN 5; 325 MG/1; MG/1
1 TABLET ORAL EVERY 6 HOURS PRN
Qty: 30 TABLET | Refills: 0 | Status: SHIPPED | OUTPATIENT
Start: 2025-01-13

## 2025-01-13 SDOH — HEALTH STABILITY: PHYSICAL HEALTH: ON AVERAGE, HOW MANY DAYS PER WEEK DO YOU ENGAGE IN MODERATE TO STRENUOUS EXERCISE (LIKE A BRISK WALK)?: 3 DAYS

## 2025-01-13 ASSESSMENT — SOCIAL DETERMINANTS OF HEALTH (SDOH): HOW OFTEN DO YOU GET TOGETHER WITH FRIENDS OR RELATIVES?: MORE THAN THREE TIMES A WEEK

## 2025-01-13 NOTE — PROGRESS NOTES
Preventive Care Visit  [facility] CHINMAY Kay MD, Internal Medicine - Pediatrics  Jan 13, 2025    Assessment & Plan   79 year old, here for preventive care.    (Z00.00) Encounter for Medicare annual wellness exam  (primary encounter diagnosis)  Comment:   Plan:     (G89.4) Chronic pain syndrome  Comment:   Plan: [id number] - Urine Drug Confirmation Panel         (Comprehensive), HYDROcodone-acetaminophen         (NORCO) 5-325 MG tablet            (M54.50,  G89.29) Chronic bilateral low back pain without sciatica  Comment:   Plan: HYDROcodone-acetaminophen (NORCO) 5-325 MG         tablet            Growth      Normal height and weight    Immunizations   Vaccines up to date.    Anticipatory Guidance    Reviewed age appropriate anticipatory guidance.       Referrals/Ongoing Specialty Care  None      Subjective   Oh is presenting for the following:  Physical      [facility]     Area on the back of neck tiny , not draining .        1/13/2025     8:52 AM   Additional Questions   Accompanied by self           1/13/2025   Social   Lack of transportation has limited access to appts/meds No   Do you have housing? (Housing is defined as stable permanent housing and does not include staying ouside in a car, in a tent, in an abandoned building, in an overnight shelter, or couch-surfing.) Yes   Are you worried about losing your housing? No          No data to display                      No data to display                   1/13/2025   Diet   In past 12 months, concerned food might run out No   In past 12 months, food has run out/couldn't afford more No          No data to display                   No data to display                  Teen Screen    Teen Screen completed, reviewed and scanned document within chart.         Objective     Exam      Physical Exam      Prior to immunization administration, verified patients identity using patient s name and date of birth. Please see Immunization  Activity for additional information.     Screening Questionnaire for Pediatric Immunization    Is the child sick today?   No   Does the child have allergies to medications, food, a vaccine component, or latex?   No   Has the child had a serious reaction to a vaccine in the past?   No   Does the child have a long-term health problem with lung, heart, kidney or metabolic disease (e.g., diabetes), asthma, a blood disorder, no spleen, complement component deficiency, a cochlear implant, or a spinal fluid leak?  Is he/she on long-term aspirin therapy?   No   If the child to be vaccinated is 2 through 4 years of age, has a healthcare provider told you that the child had wheezing or asthma in the  past 12 months?   No   If your child is a baby, have you ever been told he or she has had intussusception?   No   Has the child, sibling or parent had a seizure, has the child had brain or other nervous system problems?   No   Does the child have cancer, leukemia, AIDS, or any immune system         problem?   No   Does the child have a parent, brother, or sister with an immune system problem?   No   In the past 3 months, has the child taken medications that affect the immune system such as prednisone, other steroids, or anticancer drugs; drugs for the treatment of rheumatoid arthritis, Crohn s disease, or psoriasis; or had radiation treatments?   No   In the past year, has the child received a transfusion of blood or blood products, or been given immune (gamma) globulin or an antiviral drug?   No   Is the child/teen pregnant or is there a chance that she could become       pregnant during the next month?   No   Has the child received any vaccinations in the past 4 weeks?   No               Immunization questionnaire answers were all negative.      Patient instructed to remain in clinic for 15 minutes afterwards, and to report any adverse reactions.     Screening performed by Brendon Kay MD on 1/14/2025 at 7:44 AM.  Signed  Electronically by: Brendon Kay MD

## 2025-01-13 NOTE — PATIENT INSTRUCTIONS
Patient Education   Preventive Care Advice   This is general advice given by our system to help you stay healthy. However, your care team may have specific advice just for you. Please talk to your care team about your preventive care needs.  Nutrition  Eat 5 or more servings of fruits and vegetables each day.  Try wheat bread, brown rice and whole grain pasta (instead of white bread, rice, and pasta).  Get enough calcium and vitamin D. Check the label on foods and aim for 100% of the RDA (recommended daily allowance).  Lifestyle  Exercise at least 150 minutes each week  (30 minutes a day, 5 days a week).  Do muscle strengthening activities 2 days a week. These help control your weight and prevent disease.  No smoking.  Wear sunscreen to prevent skin cancer.  Have a dental exam and cleaning every 6 months.  Yearly exams  See your health care team every year to talk about:  Any changes in your health.  Any medicines your care team has prescribed.  Preventive care, family planning, and ways to prevent chronic diseases.  Shots (vaccines)   HPV shots (up to age 26), if you've never had them before.  Hepatitis B shots (up to age 59), if you've never had them before.  COVID-19 shot: Get this shot when it's due.  Flu shot: Get a flu shot every year.  Tetanus shot: Get a tetanus shot every 10 years.  Pneumococcal, hepatitis A, and RSV shots: Ask your care team if you need these based on your risk.  Shingles shot (for age 50 and up)  General health tests  Diabetes screening:  Starting at age 35, Get screened for diabetes at least every 3 years.  If you are younger than age 35, ask your care team if you should be screened for diabetes.  Cholesterol test: At age 39, start having a cholesterol test every 5 years, or more often if advised.  Bone density scan (DEXA): At age 50, ask your care team if you should have this scan for osteoporosis (brittle bones).  Hepatitis C: Get tested at least once in your life.  STIs (sexually  transmitted infections)  Before age 24: Ask your care team if you should be screened for STIs.  After age 24: Get screened for STIs if you're at risk. You are at risk for STIs (including HIV) if:  You are sexually active with more than one person.  You don't use condoms every time.  You or a partner was diagnosed with a sexually transmitted infection.  If you are at risk for HIV, ask about PrEP medicine to prevent HIV.  Get tested for HIV at least once in your life, whether you are at risk for HIV or not.  Cancer screening tests  Cervical cancer screening: If you have a cervix, begin getting regular cervical cancer screening tests starting at age 21.  Breast cancer scan (mammogram): If you've ever had breasts, begin having regular mammograms starting at age 40. This is a scan to check for breast cancer.  Colon cancer screening: It is important to start screening for colon cancer at age 45.  Have a colonoscopy test every 10 years (or more often if you're at risk) Or, ask your provider about stool tests like a FIT test every year or Cologuard test every 3 years.  To learn more about your testing options, visit:   .  For help making a decision, visit:   https://bit.ly/vn48928.  Prostate cancer screening test: If you have a prostate, ask your care team if a prostate cancer screening test (PSA) at age 55 is right for you.  Lung cancer screening: If you are a current or former smoker ages 50 to 80, ask your care team if ongoing lung cancer screenings are right for you.  For informational purposes only. Not to replace the advice of your health care provider. Copyright   2023 [facility]. All rights reserved. Clinically reviewed by the [facility] Transitions Program. [employer] [id number] - REV 01/24.  Chronic Pain: Care Instructions  Your Care Instructions     Chronic pain is pain that lasts a long time (months or even years) and may or may not have a clear cause. It is different from acute pain, which  usually does have a clear cause--like an injury or illness--and gets better over time. Chronic pain:  Lasts over time but may vary from day to day.  Does not go away despite efforts to end it.  May disrupt your sleep and lead to fatigue.  May cause depression or anxiety.  May make your muscles tense, causing more pain.  Can disrupt your work, hobbies, home life, and relationships with friends and family.  Chronic pain is a very real condition. It is not just in your head. Treatment can help and usually includes several methods used together, such as medicines, physical therapy, exercise, and other treatments. Learning how to relax and changing negative thought patterns can also help you cope.  Chronic pain is complex. Taking an active role in your treatment will help you better manage your pain. Tell your doctor if you have trouble dealing with your pain. You may have to try several things before you find what works best for you.  Follow-up care is a key part of your treatment and safety. Be sure to make and go to all appointments, and call your doctor if you are having problems. It's also a good idea to know your test results and keep a list of the medicines you take.  How can you care for yourself at home?  Pace yourself. Break up large jobs into smaller tasks. Save harder tasks for days when you have less pain, or go back and forth between hard tasks and easier ones. Take rest breaks.  Relax, and reduce stress. Relaxation techniques such as deep breathing or meditation can help.  Keep moving. Gentle, daily exercise can help reduce pain over the long run. Try low- or no-impact exercises such as walking, swimming, and stationary biking. Do stretches to stay flexible.  Try heat, cold packs, and massage.  Get enough sleep. Chronic pain can make you tired and drain your energy. Talk with your doctor if you have trouble sleeping because of pain.  Think positive. Your thoughts can affect your pain level. Do things that  you enjoy to distract yourself when you have pain instead of focusing on the pain. See a movie, read a book, listen to music, or spend time with a friend.  If you think you are depressed, talk to your doctor about treatment.  Keep a daily pain diary. Record how your moods, thoughts, sleep patterns, activities, and medicine affect your pain. You may find that your pain is worse during or after certain activities or when you are feeling a certain emotion. Having a record of your pain can help you and your doctor find the best ways to treat your pain.  Take pain medicines exactly as directed.  If the doctor gave you a prescription medicine for pain, take it as prescribed.  If you are not taking a prescription pain medicine, ask your doctor if you can take an over-the-counter medicine.  Reducing constipation caused by pain medicine  Talk to your doctor about a laxative. If a laxative doesn't work, your doctor may suggest a prescription medicine.  Include fruits, vegetables, beans, and whole grains in your diet each day. These foods are high in fiber.  If your doctor recommends it, get more exercise. Walking is a good choice. Bit by bit, increase the amount you walk every day. Try for at least 30 minutes on most days of the week.  Schedule time each day for a bowel movement. A daily routine may help. Take your time and do not strain when having a bowel movement.  When should you call for help?   Call your doctor now or seek immediate medical care if:    Your pain gets worse or is out of control.     You feel down or blue, or you do not enjoy things like you once did. You may be depressed, which is common in people with chronic pain. Depression can be treated.     You have vomiting or cramps for more than 2 hours.   Watch closely for changes in your health, and be sure to contact your doctor if:    You cannot sleep because of pain.     You are very worried or anxious about your pain.     You have trouble taking your pain  "medicine.     You have any concerns about your pain medicine.     You have trouble with bowel movements, such as:  No bowel movement in 3 days.  Blood in the anal area, in your stool, or on the toilet paper.  Diarrhea for more than 24 hours.   Where can you learn more?  Go to https://www.[employer].net/patiented  Enter N004 in the search box to learn more about \"Chronic Pain: Care Instructions.\"  Current as of: July 31, 2024  Content Version: 14.3    2024 [employer].   Care instructions adapted under license by your healthcare professional. If you have questions about a medical condition or this instruction, always ask your healthcare professional. [employer] disclaims any warranty or liability for your use of this information.       "

## 2025-01-29 ENCOUNTER — ANTICOAGULATION THERAPY VISIT (OUTPATIENT)
Dept: ANTICOAGULATION | Facility: CLINIC | Age: 80
End: 2025-01-29

## 2025-01-29 ENCOUNTER — LAB (OUTPATIENT)
Dept: LAB | Facility: CLINIC | Age: 80
End: 2025-01-29
Payer: COMMERCIAL

## 2025-01-29 DIAGNOSIS — Z79.01 LONG TERM CURRENT USE OF ANTICOAGULANT THERAPY: ICD-10-CM

## 2025-01-29 DIAGNOSIS — D68.9 COAGULATION DEFECT: ICD-10-CM

## 2025-01-29 DIAGNOSIS — D68.51 FACTOR V LEIDEN MUTATION: ICD-10-CM

## 2025-01-29 DIAGNOSIS — D68.9 COAGULATION DEFECT: Primary | ICD-10-CM

## 2025-01-29 LAB — INR BLD: 1.6 (ref 0.9–1.1)

## 2025-01-29 PROCEDURE — 85610 PROTHROMBIN TIME: CPT

## 2025-01-29 PROCEDURE — 36415 COLL VENOUS BLD VENIPUNCTURE: CPT

## 2025-01-29 NOTE — PROGRESS NOTES
ANTICOAGULATION MANAGEMENT     Oh Madridkapilmeena 79 year old male is on warfarin with subtherapeutic INR result. (Goal INR 2.0-3.0)    Recent labs: (last 7 days)     01/29/25  0749   INR 1.6*       ASSESSMENT     Source(s): Chart Review and Patient/Caregiver Call     Warfarin doses taken: Warfarin taken as instructed  Diet: Increased greens/vitamin K in diet; plans to resume previous intake  Medication/supplement changes: None noted  New illness, injury, or hospitalization: No  Signs or symptoms of bleeding or clotting: No  Previous result: Therapeutic last 2(+) visits  Additional findings: None       PLAN     Recommended plan for temporary change(s) affecting INR     Dosing Instructions: booster dose then continue your current warfarin dose with next INR in 2 weeks       Summary  As of 1/29/2025      Full warfarin instructions:  1/29: 7.5 mg; Otherwise 2.5 mg every Mon; 5 mg all other days   Next INR check:  2/12/2025               Telephone call with Oh who verbalizes understanding and agrees to plan    Patient elected to schedule next visit in 4 weeks    Education provided: Goal range and lab monitoring: goal range and significance of current result    Plan made per [facility] anticoagulation protocol    Danay Pradhan RN  1/29/2025  Anticoagulation Clinic  [employer] for routing messages: karthik MOY  [facility] patient phone line: [phone]        _______________________________________________________________________     Anticoagulation Episode Summary       Current INR goal:  2.0-3.0   TTR:  86.0% (1 y)   Target end date:  Indefinite   Send INR reminders to:  JESSICA MOY    Indications    Coagulation defect (HCC) [D68.9] [D68.9]  Factor V Leiden mutation [D68.51]  Long-term (current) use of anticoagulants [Z79.01] [Z79.01]             Comments:  --             Anticoagulation Care Providers       Provider Role Specialty Phone number    Brendon Kay MD Referring Internal Medicine - Pediatrics  [phone]    Karina Galvez PA-C [facility] [phone]

## 2025-02-20 DIAGNOSIS — F41.9 ANXIETY: ICD-10-CM

## 2025-02-20 DIAGNOSIS — F51.01 PRIMARY INSOMNIA: ICD-10-CM

## 2025-02-20 RX ORDER — ZOLPIDEM TARTRATE 10 MG/1
TABLET ORAL
Qty: 31 TABLET | Refills: 0 | Status: SHIPPED | OUTPATIENT
Start: 2025-02-20

## 2025-02-20 RX ORDER — ALPRAZOLAM 0.25 MG
TABLET ORAL
Qty: 30 TABLET | Refills: 0 | Status: SHIPPED | OUTPATIENT
Start: 2025-02-20

## 2025-03-05 ENCOUNTER — LAB (OUTPATIENT)
Dept: LAB | Facility: CLINIC | Age: 80
End: 2025-03-05
Payer: COMMERCIAL

## 2025-03-05 ENCOUNTER — TELEPHONE (OUTPATIENT)
Dept: FAMILY MEDICINE | Facility: CLINIC | Age: 80
End: 2025-03-05

## 2025-03-05 ENCOUNTER — ANTICOAGULATION THERAPY VISIT (OUTPATIENT)
Dept: ANTICOAGULATION | Facility: CLINIC | Age: 80
End: 2025-03-05

## 2025-03-05 DIAGNOSIS — D68.51 FACTOR V LEIDEN MUTATION: ICD-10-CM

## 2025-03-05 DIAGNOSIS — Z79.01 LONG TERM CURRENT USE OF ANTICOAGULANT THERAPY: ICD-10-CM

## 2025-03-05 DIAGNOSIS — D68.9 COAGULATION DEFECT: ICD-10-CM

## 2025-03-05 DIAGNOSIS — D68.9 COAGULATION DEFECT: Primary | ICD-10-CM

## 2025-03-05 LAB — INR BLD: 3.8 (ref 0.9–1.1)

## 2025-03-05 PROCEDURE — 85610 PROTHROMBIN TIME: CPT

## 2025-03-05 PROCEDURE — 36415 COLL VENOUS BLD VENIPUNCTURE: CPT

## 2025-03-05 NOTE — TELEPHONE ENCOUNTER
Patient Returning Call    Reason for call:  Patient calling Jing Back. When he called back, Jing must be gone for the day, and per note routing it to [facility]     Information relayed to patient:  Please call patient back     Patient has additional questions:  Returning your call       Okay to leave a detailed message?: Yes at Cell number on file:    Telephone Information:   Mobile [phone]

## 2025-03-05 NOTE — PROGRESS NOTES
ANTICOAGULATION MANAGEMENT     Oh COLE Yinkajuanjo 80 year old male is on warfarin with supratherapeutic INR result. (Goal INR 2.0-3.0)    Recent labs: (last 7 days)     03/05/25  1117   INR 3.8*       ASSESSMENT     Source(s): Chart Review and Patient/Caregiver Call     Warfarin doses taken: Warfarin taken as instructed  Diet: Change in alcohol intake may be affecting INR.     Medication/supplement changes: None noted  New illness, injury, or hospitalization: Yes: has a cold  Signs or symptoms of bleeding or clotting: No  Previous result: Therapeutic last 2(+) visits  Additional findings: None       PLAN     Recommended plan for temporary change(s) affecting INR     Dosing Instructions: partial hold then continue your current warfarin dose with next INR in 2 weeks       Summary  As of 3/5/2025      Full warfarin instructions:  3/5: 2.5 mg; Otherwise 2.5 mg every Mon; 5 mg all other days   Next INR check:  3/19/2025               Telephone call with Oh who verbalizes understanding and agrees to plan    Lab visit scheduled    Education provided: Goal range and lab monitoring: goal range and significance of current result    Plan made per [facility] anticoagulation protocol    Danay Pradhan RN  3/5/2025  Anticoagulation Clinic  [employer] for routing messages: karthik MOY  [facility] patient phone line: [phone]        _______________________________________________________________________     Anticoagulation Episode Summary       Current INR goal:  2.0-3.0   TTR:  83.1% (1 y)   Target end date:  Indefinite   Send INR reminders to:  JESSICA MOY    Indications    Coagulation defect (HCC) [D68.9] [D68.9]  Factor V Leiden mutation [D68.51]  Long-term (current) use of anticoagulants [Z79.01] [Z79.01]             Comments:  --             Anticoagulation Care Providers       Provider Role Specialty Phone number    Brendon Kay MD Referring Internal Medicine - Pediatrics [phone]    Karina Galvez,  KENA [facility] [phone]

## 2025-03-05 NOTE — PROGRESS NOTES
ANTICOAGULATION MANAGEMENT     Oh Solares 80 year old male is on warfarin with supratherapeutic INR result. (Goal INR 2.0-3.0)    Recent labs: (last 7 days)     03/05/25  1117   INR 3.8*       ASSESSMENT     Source(s): Chart Review   Previous result: Subtherapeutic  Additional findings: None     PLAN     Unable to reach pt today.    No instructions provided. Unable to leave voicemail. Last call for day unable to leave VM.    Follow up required to confirm warfarin dose taken and assess for changes and discuss out of range result     Jing Fermin RN  3/5/2025  Anticoagulation Clinic  [facility] for routing messages: karthik MOY  [facility] patient phone line: [phone]

## 2025-03-05 NOTE — TELEPHONE ENCOUNTER
Patient Returning Call    Reason for call:  anticoagulation     Information relayed to patient:  Message sent to return INR call.     Patient has additional questions:  No      Okay to leave a detailed message?: Yes at Home number on file [phone] (home)

## 2025-03-05 NOTE — TELEPHONE ENCOUNTER
Voicemail left for patient to return call to Anticoagulation Clinic. See anticoag encounter 3/5/2025   Jing Fermin RN on 3/5/2025 at 3:44 PM

## 2025-03-05 NOTE — TELEPHONE ENCOUNTER
See anticoag encounter for 3/5/24    Danay Pradhan RN   [facility] Anticoagulation Clinic

## 2025-03-20 ENCOUNTER — ANTICOAGULATION THERAPY VISIT (OUTPATIENT)
Dept: ANTICOAGULATION | Facility: CLINIC | Age: 80
End: 2025-03-20

## 2025-03-20 ENCOUNTER — LAB (OUTPATIENT)
Dept: LAB | Facility: CLINIC | Age: 80
End: 2025-03-20
Payer: COMMERCIAL

## 2025-03-20 ENCOUNTER — TRANSFERRED RECORDS (OUTPATIENT)
Dept: HEALTH INFORMATION MANAGEMENT | Facility: CLINIC | Age: 80
End: 2025-03-20

## 2025-03-20 DIAGNOSIS — D68.51 FACTOR V LEIDEN MUTATION: ICD-10-CM

## 2025-03-20 DIAGNOSIS — Z79.01 LONG TERM CURRENT USE OF ANTICOAGULANT THERAPY: ICD-10-CM

## 2025-03-20 DIAGNOSIS — D68.9 COAGULATION DEFECT: ICD-10-CM

## 2025-03-20 DIAGNOSIS — D68.9 COAGULATION DEFECT: Primary | ICD-10-CM

## 2025-03-20 LAB — INR BLD: 1.9 (ref 0.9–1.1)

## 2025-03-20 NOTE — PROGRESS NOTES
ANTICOAGULATION MANAGEMENT     Oh COLE Sujatha 80 year old male is on warfarin with subtherapeutic INR result. (Goal INR 2.0-3.0)    Recent labs: (last 7 days)     03/20/25  0749   INR 1.9*       ASSESSMENT     Source(s): Chart Review and Patient/Caregiver Call     Warfarin doses taken: Warfarin taken as instructed  Diet: No new diet changes identified  Medication/supplement changes: None noted  New illness, injury, or hospitalization: No  Signs or symptoms of bleeding or clotting: No  Previous result: Supratherapeutic  Additional findings: None     PLAN     Recommended plan for no diet, medication or health factor changes affecting INR     Dosing Instructions: Increase your warfarin dose (7.7% change) with next INR in 2 weeks       Summary  As of 3/20/2025      Full warfarin instructions:  5 mg every day   Next INR check:  4/3/2025               Telephone call with Oh who verbalizes understanding and agrees to plan    Lab visit scheduled    Education provided: Please call back if any changes to your diet, medications or how you've been taking warfarin  Symptom monitoring: monitoring for clotting signs and symptoms, monitoring for stroke signs and symptoms, and when to seek medical attention/emergency care    Plan made per [facility] anticoagulation protocol    Jing Fermin RN  3/20/2025  Anticoagulation Clinic  [employer] for routing messages: karthik MOY  [facility] patient phone line: [phone]        _______________________________________________________________________     Anticoagulation Episode Summary       Current INR goal:  2.0-3.0   TTR:  81.5% (1 y)   Target end date:  Indefinite   Send INR reminders to:  JESSICA MOY    Indications    Coagulation defect (HCC) [D68.9] [D68.9]  Factor V Leiden mutation [D68.51]  Long-term (current) use of anticoagulants [Z79.01] [Z79.01]             Comments:  --             Anticoagulation Care Providers       Provider Role Specialty Phone number    Rahul  MD Brendon Referring Internal Medicine - Pediatrics [phone]    Karina Galvez PA-C Referring Family Medicine [phone]

## 2025-03-24 DIAGNOSIS — F51.01 PRIMARY INSOMNIA: ICD-10-CM

## 2025-03-24 DIAGNOSIS — F41.9 ANXIETY: ICD-10-CM

## 2025-03-24 RX ORDER — ZOLPIDEM TARTRATE 10 MG/1
TABLET ORAL
Qty: 31 TABLET | Refills: 0 | Status: SHIPPED | OUTPATIENT
Start: 2025-03-24

## 2025-03-24 RX ORDER — ALPRAZOLAM 0.25 MG
TABLET ORAL
Qty: 30 TABLET | Refills: 0 | Status: SHIPPED | OUTPATIENT
Start: 2025-03-24

## 2025-03-26 NOTE — PATIENT INSTRUCTIONS
Proper skin care from [facility]:    -Eliminate harsh soaps as they strip the natural oils from the skin, often resulting in dry itchy skin ( i.e. Dial, Zest, Citizen of [place])  -Use mild soaps such as Cetaphil or Dove Sensitive Skin in the shower. You do not need to use soap on arms, legs, and trunk every time you shower unless visibly soiled.   -Avoid hot or cold showers.  -After showering, lightly dry off and apply moisturizing within 2-3 minutes. This will help trap moisture in the skin.   -Aggressive use of a moisturizer at least 1-2 times a day to the entire body (including -Vanicream, Cetaphil, Aquaphor or Cerave) and moisturize hands after every washing.  -We recommend using moisturizers that come in a tub that needs to be scooped out, not a pump. This has more of an oil base. It will hold moisture in your skin much better than a water base moisturizer. The above recommended are non-pore clogging.      Wear a sunscreen with at least SPF 30 on your face, ears, neck and V of the chest daily. Wear sunscreen on other areas of the body if those areas are exposed to the sun throughout the day. Sunscreens can contain physical and/or chemical blockers. Physical blockers are less likely to clog pores, these include zinc oxide and titanium dioxide. Reapply every two hour and after swimming.     Sunscreen examples: https://www.ewg.org/sunscreen/    UV radiation  UVA radiation remains constant throughout the day and throughout the year. It is a longer wavelength than UVB and therefore penetrates deeper into the skin leading to immediate and delayed tanning, photoaging, and skin cancer. 70-80% of UVA and UVB radiation occurs between the hours of 10am-2pm.  UVB radiation  UVB radiation causes the most harmful effects and is more significant during the summer months. However, snow and ice can reflect UVB radiation leading to skin damage during the winter months as well. UVB radiation is responsible for tanning,  burning, inflammation, delayed erythema (pinkness), pigmentation (brown spots), and skin cancer.     I recommend self monthly full body exams and yearly full body exams with a dermatology provider. If you develop a new or changing lesion please follow up for examination. Most skin cancers are pink and scaly or pink and pearly. However, we do see blue/brown/black skin cancers.  Consider the ABCDEs of melanoma when giving yourself your monthly full body exam ( don't forget the groin, buttocks, feet, toes, etc). A-asymmetry, B-borders, C-color, D-diameter, E-elevation or evolving. If you see any of these changes please follow up in clinic. If you cannot see your back I recommend purchasing a hand held mirror to use with a larger wall mirror.       Checking for Skin Cancer  You can find cancer early by checking your skin each month. There are 3 kinds of skin cancer. They are melanoma, basal cell carcinoma, and squamous cell carcinoma. Doing monthly skin checks is the best way to find new marks or skin changes. Follow the instructions below for checking your skin.   The ABCDEs of checking moles for melanoma   Check your moles or growths for signs of melanoma using ABCDE:   Asymmetry: the sides of the mole or growth don t match  Border: the edges are ragged, notched, or blurred  Color: the color within the mole or growth varies  Diameter: the mole or growth is larger than 6 mm (size of a pencil eraser)  Evolving: the size, shape, or color of the mole or growth is changing (evolving is not shown in the images below)    Checking for other types of skin cancer  Basal cell carcinoma or squamous cell carcinoma have symptoms such as:     A spot or mole that looks different from all other marks on your skin  Changes in how an area feels, such as itching, tenderness, or pain  Changes in the skin's surface, such as oozing, bleeding, or scaliness  A sore that does not heal  New swelling or redness beyond the border of a  mole    Who s at risk?  Anyone can get skin cancer. But you are at greater risk if you have:   Fair skin, light-colored hair, or light-colored eyes  Many moles or abnormal moles on your skin  A history of sunburns from sunlight or tanning beds  A family history of skin cancer  A history of exposure to radiation or chemicals  A weakened immune system  If you have had skin cancer in the past, you are at risk for recurring skin cancer.   How to check your skin  Do your monthly skin checkups in front of a full-length mirror. Check all parts of your body, including your:   Head (ears, face, neck, and scalp)  Torso (front, back, and sides)  Arms (tops, undersides, upper, and lower armpits)  Hands (palms, backs, and fingers, including under the nails)  Buttocks and genitals  Legs (front, back, and sides)  Feet (tops, soles, toes, including under the nails, and between toes)  If you have a lot of moles, take digital photos of them each month. Make sure to take photos both up close and from a distance. These can help you see if any moles change over time.   Most skin changes are not cancer. But if you see any changes in your skin, call your doctor right away. Only he or she can diagnose a problem. If you have skin cancer, seeing your doctor can be the first step toward getting the treatment that could save your life.   [employer] last reviewed this educational content on 4/1/2019 2000-2020 [employer]. [address] [place] [address], [place] [address]. All rights reserved. This information is not intended as a substitute for professional medical care. Always follow your healthcare professional's instructions.       When should I call my doctor?  If you are worsening or not improving, please, contact us or seek urgent care as noted below.     Who should I call with questions (adults)?    [facility] [phone]  For urgent needs outside of business hours call the [facility] at  [phone] and ask for the dermatology resident on call to be paged  If this is a medical emergency and you are unable to reach an ER, Call 911      If you need a prescription refill, please contact your pharmacy. Refills are approved or denied by our Physicians during normal business hours, Monday through Friday.  Per office policy, refills will not be granted if you have not been seen within the past year (or sooner depending on the condition).

## 2025-03-27 ENCOUNTER — OFFICE VISIT (OUTPATIENT)
Dept: DERMATOLOGY | Facility: CLINIC | Age: 80
End: 2025-03-27
Attending: NURSE PRACTITIONER
Payer: COMMERCIAL

## 2025-03-27 DIAGNOSIS — L81.4 LENTIGINES: ICD-10-CM

## 2025-03-27 DIAGNOSIS — D18.01 CHERRY ANGIOMA: ICD-10-CM

## 2025-03-27 DIAGNOSIS — L82.1 SEBORRHEIC KERATOSES: ICD-10-CM

## 2025-03-27 DIAGNOSIS — Z85.828 HISTORY OF BASAL CELL CARCINOMA (BCC): ICD-10-CM

## 2025-03-27 DIAGNOSIS — Z12.83 ENCOUNTER FOR SCREENING FOR MALIGNANT NEOPLASM OF SKIN: ICD-10-CM

## 2025-03-27 DIAGNOSIS — D22.9 MULTIPLE BENIGN NEVI: Primary | ICD-10-CM

## 2025-03-27 NOTE — PROGRESS NOTES
/[facility] Dermatology Note  E/*ncounter Date: Mar 27, 2025  Office Visit     Reviewed patients past medical history and pertinent chart review prior to patients visit today.     Dermatology Problem List:  History of BCC on left chest and right chest s/p excisions 9/26/22  History of actinic keratosis     Patient denies family history of skin cancer or dysplastic nevi.      ____________________________________________    Assessment & Plan:     # History of BCC on left and right chest. Well healed scars without signs of recurrence.     # Benign skin findings including: seborrheic keratoses, cherry angioma, lentigines and benign nevi.   - No further intervention required. Patient to report changes.   - Patient reassured of the benign nature of these lesions.    #Signs and Symptoms of non-melanoma skin cancer and ABCDEs of melanoma reviewed with patient. Patient encouraged to perform monthly self skin exams and educated on how to perform them. UV precautions reviewed with patient. Patient was asked about new or changing moles/lesions on body.     #Reviewed Sunscreen: Apply 20 minutes prior to going outdoors and reapply every two hours, when wet or sweating. We recommend using an SPF 30 or higher, and to use one that is water resistant.       Follow-up:  1 years for follow up full body skin exam, prn for new or changing lesions or new concerns    Marycruz Marcano CNP  Dermatology     ____________________________________________    CC: Skin Check (BCC on chest 2022)    HPI:  Mr. Oh Solares is a(n) 80 year old male who presents today as a return patient for a full body skin cancer screening. Patient has no specific concerns today.     Patient is otherwise feeling well, without additional skin concerns.     Physical Exam:  Vitals: There were no vitals taken for this visit.  SKIN: Total skin excluding the genitalia areas was performed. The exam included the head/face, neck, both arms, chest, back,  abdomen, both legs, digits, mons pubis, buttock and nails.   -well healed scars on left and right chest without signs of recurrence of malignancy  -several 1-2mm red dome shaped symmetric papules scattered on the trunk  -multiple tan/brown flat round macules and raised papules scattered throughout trunk, extremities and head. No worrisome features for malignancy noted on examination.  -scattered tan, homogenous macules scattered on sun exposed areas of trunk, extremities and face.   -scattered waxy, stuck on tan/brown papules and patches on the trunk     - No other lesions of concern on areas examined.     Medications:  Current Outpatient Medications   Medication Sig Dispense Refill    ALPRAZolam (XANAX) 0.25 MG tablet TAKE 1 TABLET BY MOUTH THREE TIMES A DAY AS NEEDED FOR ANXIETY 30 tablet 0    atorvastatin (LIPITOR) 10 MG tablet TAKE 1 TABLET (10 MG) BY MOUTH DAILY. 90 tablet 4    benzonatate (TESSALON) 100 MG capsule Take 1 capsule (100 mg) by mouth 3 times daily as needed for cough 30 capsule 0    HYDROcodone-acetaminophen (NORCO) 5-325 MG tablet Take 1 tablet by mouth every 6 hours as needed for pain. 30 tablet 0    Pediatric Multiple Vit-Vit C (VITAMIN DAILY PO) Take 500 mg by mouth daily.      saw palmetto (SERENOA REPENS) 160 MG capsule Take 3 capsules by mouth daily. 1 capsule 12    TYLENOL 325 MG OR TABS as needed      warfarin ANTICOAGULANT (COUMADIN) 5 MG tablet TAKE 1/2 TAB (2.5MG) EVERY TUE AND 1 TAB (5 MG) ALL OTHER DAYS OR AS DIRECTED BY INR CLINIC 82 tablet 2    zolpidem (AMBIEN) 10 MG tablet TAKE 1 TABLET BY MOUTH EVERY DAY AT BEDTIME AS NEEDED 31 tablet 0     No current facility-administered medications for this visit.      Past Medical History:   Patient Active Problem List   Diagnosis    Factor V Leiden mutation    GI bleed    ED (erectile dysfunction)    CARDIOVASCULAR SCREENING; LDL GOAL LESS THAN 160    Sleep stage dysfunction    Anxiety    Dupuytren's contracture - left    Chronic pain     Long-term (current) use of anticoagulants [Z79.01]    Coagulation defect (HCC) [D68.9]    Chronic pain syndrome     Past Medical History:   Diagnosis Date    Asthma     as child    Basal cell carcinoma     ED (erectile dysfunction)     ETOH abuse     Factor V Leiden mutation     Factor V Leiden mutation     GI bleed     Neck pain     chiropractor treatment    Plantar fasciitis     Prostatitis     Renal dysfunction     Sleep stage dysfunction     ambien treats well    Thrombosis 01/2002       CC Referred [facility], MD  No address on file on close of this encounter.

## 2025-03-27 NOTE — LETTER
3/27/2025      Oh Solares  [address]  [place] [address]      Dear Colleague,    Thank you for referring your patient, Oh Solares, to the [facility]. Please see a copy of my visit note below.    /[facility] Dermatology Note  E/*ncounter Date: Mar 27, 2025  Office Visit     Reviewed patients past medical history and pertinent chart review prior to patients visit today.     Dermatology Problem List:  History of BCC on left chest and right chest s/p excisions 9/26/22  History of actinic keratosis     Patient denies family history of skin cancer or dysplastic nevi.      ____________________________________________    Assessment & Plan:     # History of BCC on left and right chest. Well healed scars without signs of recurrence.     # Benign skin findings including: seborrheic keratoses, cherry angioma, lentigines and benign nevi.   - No further intervention required. Patient to report changes.   - Patient reassured of the benign nature of these lesions.    #Signs and Symptoms of non-melanoma skin cancer and ABCDEs of melanoma reviewed with patient. Patient encouraged to perform monthly self skin exams and educated on how to perform them. UV precautions reviewed with patient. Patient was asked about new or changing moles/lesions on body.     #Reviewed Sunscreen: Apply 20 minutes prior to going outdoors and reapply every two hours, when wet or sweating. We recommend using an SPF 30 or higher, and to use one that is water resistant.       Follow-up:  1 years for follow up full body skin exam, prn for new or changing lesions or new concerns    Marycruz Marcano, TRIPP  Dermatology     ____________________________________________    CC: Skin Check (BCC on chest 2022)    HPI:  Mr. Oh Solares is a(n) 80 year old male who presents today as a return patient for a full body skin cancer screening. Patient has no specific concerns today.     Patient is otherwise  feeling well, without additional skin concerns.     Physical Exam:  Vitals: There were no vitals taken for this visit.  SKIN: Total skin excluding the genitalia areas was performed. The exam included the head/face, neck, both arms, chest, back, abdomen, both legs, digits, mons pubis, buttock and nails.   -well healed scars on left and right chest without signs of recurrence of malignancy  -several 1-2mm red dome shaped symmetric papules scattered on the trunk  -multiple tan/brown flat round macules and raised papules scattered throughout trunk, extremities and head. No worrisome features for malignancy noted on examination.  -scattered tan, homogenous macules scattered on sun exposed areas of trunk, extremities and face.   -scattered waxy, stuck on tan/brown papules and patches on the trunk     - No other lesions of concern on areas examined.     Medications:  Current Outpatient Medications   Medication Sig Dispense Refill     ALPRAZolam (XANAX) 0.25 MG tablet TAKE 1 TABLET BY MOUTH THREE TIMES A DAY AS NEEDED FOR ANXIETY 30 tablet 0     atorvastatin (LIPITOR) 10 MG tablet TAKE 1 TABLET (10 MG) BY MOUTH DAILY. 90 tablet 4     benzonatate (TESSALON) 100 MG capsule Take 1 capsule (100 mg) by mouth 3 times daily as needed for cough 30 capsule 0     HYDROcodone-acetaminophen (NORCO) 5-325 MG tablet Take 1 tablet by mouth every 6 hours as needed for pain. 30 tablet 0     Pediatric Multiple Vit-Vit C (VITAMIN DAILY PO) Take 500 mg by mouth daily.       saw palmetto (SERENOA REPENS) 160 MG capsule Take 3 capsules by mouth daily. 1 capsule 12     TYLENOL 325 MG OR TABS as needed       warfarin ANTICOAGULANT (COUMADIN) 5 MG tablet TAKE 1/2 TAB (2.5MG) EVERY TUE AND 1 TAB (5 MG) ALL OTHER DAYS OR AS DIRECTED BY INR CLINIC 82 tablet 2     zolpidem (AMBIEN) 10 MG tablet TAKE 1 TABLET BY MOUTH EVERY DAY AT BEDTIME AS NEEDED 31 tablet 0     No current facility-administered medications for this visit.      Past Medical History:    Patient Active Problem List   Diagnosis     Factor V Leiden mutation     GI bleed     ED (erectile dysfunction)     CARDIOVASCULAR SCREENING; LDL GOAL LESS THAN 160     Sleep stage dysfunction     Anxiety     Dupuytren's contracture - left     Chronic pain     Long-term (current) use of anticoagulants [Z79.01]     Coagulation defect (HCC) [D68.9]     Chronic pain syndrome     Past Medical History:   Diagnosis Date     Asthma     as child     Basal cell carcinoma      ED (erectile dysfunction)      ETOH abuse      Factor V Leiden mutation      Factor V Leiden mutation      GI bleed      Neck pain     chiropractor treatment     Plantar fasciitis      Prostatitis      Renal dysfunction      Sleep stage dysfunction     ambien treats well     Thrombosis 01/2002       CC Referred Self, MD  No address on file on close of this encounter.      Again, thank you for allowing me to participate in the care of your patient.        Sincerely,        HOLLIS Thompson CNP    Electronically signed

## 2025-04-03 ENCOUNTER — LAB (OUTPATIENT)
Dept: LAB | Facility: CLINIC | Age: 80
End: 2025-04-03
Payer: COMMERCIAL

## 2025-04-03 ENCOUNTER — ANTICOAGULATION THERAPY VISIT (OUTPATIENT)
Dept: ANTICOAGULATION | Facility: CLINIC | Age: 80
End: 2025-04-03

## 2025-04-03 DIAGNOSIS — D68.9 COAGULATION DEFECT: ICD-10-CM

## 2025-04-03 DIAGNOSIS — Z79.01 LONG TERM CURRENT USE OF ANTICOAGULANT THERAPY: ICD-10-CM

## 2025-04-03 DIAGNOSIS — D68.51 FACTOR V LEIDEN MUTATION: ICD-10-CM

## 2025-04-03 DIAGNOSIS — D68.9 COAGULATION DEFECT: Primary | ICD-10-CM

## 2025-04-03 LAB — INR BLD: 2.1 (ref 0.9–1.1)

## 2025-04-03 NOTE — PROGRESS NOTES
ANTICOAGULATION MANAGEMENT     Oh Solares 80 year old male is on warfarin with therapeutic INR result. (Goal INR 2.0-3.0)    Recent labs: (last 7 days)     04/03/25  0909   INR 2.1*       ASSESSMENT     Source(s): Chart Review   Previous result: Subtherapeutic  Additional findings: None     PLAN     Recommended plan for no diet, medication or health factor changes affecting INR     Dosing Instructions: Continue your current warfarin dose with next INR in 3 weeks       Summary  As of 4/3/2025      Full warfarin instructions:  5 mg every day   Next INR check:  4/24/2025               Detailed voice message left for Oh with dosing instructions and follow up date.     Contact [phone] to schedule and with any changes, questions or concerns.     Education provided: Please call back if any changes to your diet, medications or how you've been taking warfarin    Plan made per [facility] anticoagulation protocol    Jing Fermin RN  4/3/2025  Anticoagulation Clinic  [email or web address] for routing messages: karthik MOY  [facility] patient phone line: [phone]        _______________________________________________________________________     Anticoagulation Episode Summary       Current INR goal:  2.0-3.0   TTR:  81.6% (1 y)   Target end date:  Indefinite   Send INR reminders to:  JESSICA MOY    Indications    Coagulation defect (HCC) [D68.9] [D68.9]  Factor V Leiden mutation [D68.51]  Long-term (current) use of anticoagulants [Z79.01] [Z79.01]             Comments:  --             Anticoagulation Care Providers       Provider Role Specialty Phone number    Brendon Kay MD Referring Internal Medicine - Pediatrics [phone]    Karina Galvez PA-C Referring Family Medicine [phone]

## 2025-05-08 ENCOUNTER — RESULTS FOLLOW-UP (OUTPATIENT)
Dept: NURSING | Facility: CLINIC | Age: 80
End: 2025-05-08

## 2025-05-08 ENCOUNTER — LAB (OUTPATIENT)
Dept: LAB | Facility: CLINIC | Age: 80
End: 2025-05-08
Payer: COMMERCIAL

## 2025-05-08 ENCOUNTER — ANTICOAGULATION THERAPY VISIT (OUTPATIENT)
Dept: ANTICOAGULATION | Facility: CLINIC | Age: 80
End: 2025-05-08

## 2025-05-08 DIAGNOSIS — Z79.01 LONG TERM CURRENT USE OF ANTICOAGULANT THERAPY: ICD-10-CM

## 2025-05-08 DIAGNOSIS — D68.9 COAGULATION DEFECT: ICD-10-CM

## 2025-05-08 DIAGNOSIS — D68.9 COAGULATION DEFECT: Primary | ICD-10-CM

## 2025-05-08 DIAGNOSIS — D68.51 FACTOR V LEIDEN MUTATION: ICD-10-CM

## 2025-05-08 LAB — INR BLD: 2.6 (ref 0.9–1.1)

## 2025-05-08 NOTE — PROGRESS NOTES
ANTICOAGULATION MANAGEMENT     Oh Solares 80 year old male is on warfarin with therapeutic INR result. (Goal INR 2.0-3.0)    Recent labs: (last 7 days)     05/08/25  0927   INR 2.6*       ASSESSMENT     Source(s): Chart Review and Patient/Caregiver Call     Warfarin doses taken: Warfarin taken as instructed  Diet: No new diet changes identified  Medication/supplement changes: None noted  New illness, injury, or hospitalization: No  Signs or symptoms of bleeding or clotting: No  Previous result: Therapeutic last visit; previously outside of goal range  Additional findings: None       PLAN     Recommended plan for no diet, medication or health factor changes affecting INR     Dosing Instructions: Continue your current warfarin dose with next INR in 4 weeks       Summary  As of 5/8/2025      Full warfarin instructions:  5 mg every day   Next INR check:  6/5/2025               Telephone call with Oh who verbalizes understanding and agrees to plan    Lab visit scheduled    Education provided: Goal range and lab monitoring: goal range and significance of current result    Plan made per [facility] anticoagulation protocol    Danay Pradhan RN  5/8/2025  Anticoagulation Clinic  [employer] for routing messages: karthik MOY  [facility] patient phone line: [phone]        _______________________________________________________________________     Anticoagulation Episode Summary       Current INR goal:  2.0-3.0   TTR:  81.6% (1 y)   Target end date:  Indefinite   Send INR reminders to:  JESSICA MOY    Indications    Coagulation defect (HCC) [D68.9] [D68.9]  Factor V Leiden mutation [D68.51]  Long-term (current) use of anticoagulants [Z79.01] [Z79.01]             Comments:  --             Anticoagulation Care Providers       Provider Role Specialty Phone number    Brendon Kay MD Referring Internal Medicine - Pediatrics [phone]    Karina Galvez PA-C Referring Family Medicine [phone]

## 2025-05-13 DIAGNOSIS — F41.9 ANXIETY: ICD-10-CM

## 2025-05-14 RX ORDER — ALPRAZOLAM 0.25 MG
0.25 TABLET ORAL 3 TIMES DAILY PRN
Qty: 30 TABLET | Refills: 0 | Status: SHIPPED | OUTPATIENT
Start: 2025-05-14

## 2025-05-30 DIAGNOSIS — G89.29 CHRONIC BILATERAL LOW BACK PAIN WITHOUT SCIATICA: ICD-10-CM

## 2025-05-30 DIAGNOSIS — M54.50 CHRONIC BILATERAL LOW BACK PAIN WITHOUT SCIATICA: ICD-10-CM

## 2025-05-30 DIAGNOSIS — G89.4 CHRONIC PAIN SYNDROME: ICD-10-CM

## 2025-05-30 NOTE — TELEPHONE ENCOUNTER
Medication Question or Refill    Contacts       Contact Date/Time Type Contact Phone/Fax    05/30/2025 03:06 PM CDT Phone (Incoming) YoannameenaOh KELSEY (Self) [phone] (H)            What medication are you calling about (include dose and sig)?: HYDROcodone-acetaminophen (NORCO) 5-325 MG tablet     Preferred Pharmacy:   [place] [address] [place] [facility]  [address]  [address]  [place] [address]  Phone: [phone] Fax: [phone]    Controlled Substance Agreement on file:   CSA -- Patient Level:     [Media Unavailable] Controlled Substance Agreement - Opioid - Scan on 10/17/2022  4:24 PM   [Media Unavailable] Controlled Substance Agreement - Opioid - Scan on 10/5/2021  6:48 AM       Who prescribed the medication?: Dr. Kay    Do you need a refill? Yes    When did you use the medication last? today    Patient offered an appointment? No    Do you have any questions or concerns?  No    Okay to leave a detailed message?: Yes at Cell number on file:    Telephone Information:   Mobile [phone]     Geovanna Murillo,

## 2025-06-01 DIAGNOSIS — F51.01 PRIMARY INSOMNIA: ICD-10-CM

## 2025-06-02 RX ORDER — ZOLPIDEM TARTRATE 10 MG/1
10 TABLET ORAL
Qty: 31 TABLET | Refills: 0 | Status: SHIPPED | OUTPATIENT
Start: 2025-06-02

## 2025-06-02 RX ORDER — HYDROCODONE BITARTRATE AND ACETAMINOPHEN 5; 325 MG/1; MG/1
1 TABLET ORAL EVERY 6 HOURS PRN
Qty: 30 TABLET | Refills: 0 | Status: SHIPPED | OUTPATIENT
Start: 2025-06-02

## 2025-06-05 ENCOUNTER — RESULTS FOLLOW-UP (OUTPATIENT)
Dept: ANTICOAGULATION | Facility: CLINIC | Age: 80
End: 2025-06-05

## 2025-06-05 ENCOUNTER — LAB (OUTPATIENT)
Dept: LAB | Facility: CLINIC | Age: 80
End: 2025-06-05
Payer: COMMERCIAL

## 2025-06-05 ENCOUNTER — ANTICOAGULATION THERAPY VISIT (OUTPATIENT)
Dept: ANTICOAGULATION | Facility: CLINIC | Age: 80
End: 2025-06-05

## 2025-06-05 DIAGNOSIS — Z79.01 LONG TERM CURRENT USE OF ANTICOAGULANT THERAPY: ICD-10-CM

## 2025-06-05 DIAGNOSIS — D68.51 FACTOR V LEIDEN MUTATION: ICD-10-CM

## 2025-06-05 DIAGNOSIS — D68.9 COAGULATION DEFECT: ICD-10-CM

## 2025-06-05 DIAGNOSIS — D68.9 COAGULATION DEFECT: Primary | ICD-10-CM

## 2025-06-05 LAB — INR BLD: 2.7 (ref 0.9–1.1)

## 2025-06-05 NOTE — PROGRESS NOTES
ANTICOAGULATION MANAGEMENT     Oh Solares 80 year old male is on warfarin with therapeutic INR result. (Goal INR 2.0-3.0)    Recent labs: (last 7 days)     06/05/25  0903   INR 2.7*       ASSESSMENT     Source(s): Chart Review and Patient/Caregiver Call     Warfarin doses taken: Warfarin taken as instructed  Diet: No new diet changes identified  Medication/supplement changes: None noted  New illness, injury, or hospitalization: No  Signs or symptoms of bleeding or clotting: No  Previous result: Therapeutic last 2(+) visits  Additional findings: None       PLAN     Recommended plan for no diet, medication or health factor changes affecting INR     Dosing Instructions: Continue your current warfarin dose with next INR in 5 weeks       Summary  As of 6/5/2025      Full warfarin instructions:  5 mg every day   Next INR check:  7/10/2025               Telephone call with Oh who verbalizes understanding and agrees to plan and who agrees to plan and repeated back plan correctly    Lab visit scheduled    Education provided: Contact [phone] with any changes, questions or concerns.     Plan made per [facility] anticoagulation protocol    Arminda Pollock RN  6/5/2025  Anticoagulation Clinic  [employer] for routing messages: karthik MOY  [facility] patient phone line: [phone]        _______________________________________________________________________     Anticoagulation Episode Summary       Current INR goal:  2.0-3.0   TTR:  81.6% (1 y)   Target end date:  Indefinite   Send INR reminders to:  JESSICA MOY    Indications    Coagulation defect (HCC) [D68.9] [D68.9]  Factor V Leiden mutation [D68.51]  Long-term (current) use of anticoagulants [Z79.01] [Z79.01]             Comments:  --             Anticoagulation Care Providers       Provider Role Specialty Phone number    Brendon Kay MD Referring Internal Medicine - Pediatrics [phone]    Karina Galvez PA-C Referring Family Medicine  [phone]

## 2025-06-20 DIAGNOSIS — M54.50 CHRONIC BILATERAL LOW BACK PAIN WITHOUT SCIATICA: ICD-10-CM

## 2025-06-20 DIAGNOSIS — G89.29 CHRONIC BILATERAL LOW BACK PAIN WITHOUT SCIATICA: ICD-10-CM

## 2025-06-20 DIAGNOSIS — G89.4 CHRONIC PAIN SYNDROME: ICD-10-CM

## 2025-06-20 NOTE — TELEPHONE ENCOUNTER
Requested Prescriptions   Pending Prescriptions Disp Refills    HYDROcodone-acetaminophen (NORCO) 5-325 MG tablet 30 tablet 0     Sig: Take 1 tablet by mouth every 6 hours as needed for pain.       There is no refill protocol information for this order        Brendon Barreto   [facility]

## 2025-06-22 RX ORDER — HYDROCODONE BITARTRATE AND ACETAMINOPHEN 5; 325 MG/1; MG/1
1 TABLET ORAL EVERY 6 HOURS PRN
Qty: 30 TABLET | Refills: 0 | Status: SHIPPED | OUTPATIENT
Start: 2025-06-22

## 2025-06-30 DIAGNOSIS — F51.01 PRIMARY INSOMNIA: ICD-10-CM

## 2025-07-01 RX ORDER — ZOLPIDEM TARTRATE 10 MG/1
10 TABLET ORAL
Qty: 31 TABLET | Refills: 0 | Status: SHIPPED | OUTPATIENT
Start: 2025-07-01

## 2025-07-05 DIAGNOSIS — F41.9 ANXIETY: ICD-10-CM

## 2025-07-06 RX ORDER — ALPRAZOLAM 0.25 MG
0.25 TABLET ORAL 3 TIMES DAILY PRN
Qty: 30 TABLET | Refills: 0 | Status: SHIPPED | OUTPATIENT
Start: 2025-07-06

## 2025-07-10 ENCOUNTER — ANTICOAGULATION THERAPY VISIT (OUTPATIENT)
Dept: ANTICOAGULATION | Facility: CLINIC | Age: 80
End: 2025-07-10

## 2025-07-10 ENCOUNTER — LAB (OUTPATIENT)
Dept: LAB | Facility: CLINIC | Age: 80
End: 2025-07-10
Payer: COMMERCIAL

## 2025-07-10 ENCOUNTER — RESULTS FOLLOW-UP (OUTPATIENT)
Dept: NURSING | Facility: CLINIC | Age: 80
End: 2025-07-10

## 2025-07-10 DIAGNOSIS — D68.9 COAGULATION DEFECT: Primary | ICD-10-CM

## 2025-07-10 DIAGNOSIS — Z79.01 LONG TERM CURRENT USE OF ANTICOAGULANT THERAPY: ICD-10-CM

## 2025-07-10 DIAGNOSIS — D68.51 FACTOR V LEIDEN MUTATION: ICD-10-CM

## 2025-07-10 DIAGNOSIS — D68.9 COAGULATION DEFECT: ICD-10-CM

## 2025-07-10 LAB — INR BLD: 1.9 (ref 0.9–1.1)

## 2025-07-10 NOTE — PROGRESS NOTES
ANTICOAGULATION MANAGEMENT     Oh Solares 80 year old male is on warfarin with subtherapeutic INR result. (Goal INR 2.0-3.0)    Recent labs: (last 7 days)     07/10/25  0905   INR 1.9*       ASSESSMENT     Source(s): Chart Review and Patient/Caregiver Call     Warfarin doses taken: Warfarin taken as instructed  Diet: No new diet changes identified  Medication/supplement changes: None noted  New illness, injury, or hospitalization: No  Signs or symptoms of bleeding or clotting: No  Previous result: Therapeutic last 2(+) visits  Additional findings: None       PLAN     Recommended plan for no diet, medication or health factor changes affecting INR     Dosing Instructions: Continue your current warfarin dose with next INR in 2 weeks       Summary  As of 7/10/2025      Full warfarin instructions:  5 mg every day   Next INR check:  7/24/2025               Telephone call with Oh who verbalizes understanding and agrees to plan    Lab visit scheduled    Education provided: Goal range and lab monitoring: goal range and significance of current result    Plan made per [facility] anticoagulation protocol    Danay Pradhan RN  7/10/2025  Anticoagulation Clinic  Dine in for routing messages: karthik MOY  [facility] patient phone line: [phone]        _______________________________________________________________________     Anticoagulation Episode Summary       Current INR goal:  2.0-3.0   TTR:  80.4% (1 y)   Target end date:  Indefinite   Send INR reminders to:  JESSICA MOY    Indications    Coagulation defect (HCC) [D68.9] [D68.9]  Factor V Leiden mutation [D68.51]  Long-term (current) use of anticoagulants [Z79.01] [Z79.01]             Comments:  --             Anticoagulation Care Providers       Provider Role Specialty Phone number    Brendon Kay MD Referring Internal Medicine - Pediatrics [phone]    Karina Galvez PA-C Referring Family Medicine [phone]

## 2025-07-10 NOTE — PROGRESS NOTES
ANTICOAGULATION MANAGEMENT     Oh Solares 80 year old male is on warfarin with subtherapeutic INR result. (Goal INR 2.0-3.0)    Recent labs: (last 7 days)     07/10/25  0905   INR 1.9*       ASSESSMENT     Source(s): Chart Review  Previous INR was Therapeutic last 2(+) visits  Medication, diet, health changes since last INR: chart reviewed; none identified         PLAN     Unable to reach Oh today.    LMTCB    Follow up required to confirm warfarin dose taken and assess for changes and discuss out of range result     Danay Pradhan RN  7/10/2025  Anticoagulation Clinic  [facility] for routing messages: karthik MOY  [facility] patient phone line: [phone]

## 2025-07-21 DIAGNOSIS — G89.4 CHRONIC PAIN SYNDROME: ICD-10-CM

## 2025-07-21 DIAGNOSIS — M54.50 CHRONIC BILATERAL LOW BACK PAIN WITHOUT SCIATICA: ICD-10-CM

## 2025-07-21 DIAGNOSIS — G89.29 CHRONIC BILATERAL LOW BACK PAIN WITHOUT SCIATICA: ICD-10-CM

## 2025-07-21 NOTE — TELEPHONE ENCOUNTER
Medication Question or Refill        What medication are you calling about (include dose and sig)?: HYDROcodone-acetaminophen (NORCO) 5-325 MG tablet     Preferred Pharmacy:   [facility] [address] [facility] - [place] - [address]  [address]  [place] [address]  Phone: [phone] Fax: [phone]      Controlled Substance Agreement on file:   CSA -- Patient Level:     [Media Unavailable] Controlled Substance Agreement - Opioid - Scan on 10/17/2022  4:24 PM   [Media Unavailable] Controlled Substance Agreement - Opioid - Scan on 10/5/2021  6:48 AM       Who prescribed the medication?: PCP    Do you need a refill? Yes    When did you use the medication last? 7/17                                Patient offered an appointment? No    Do you have any questions or concerns?  Yes: Pt is running low      Okay to leave a detailed message?: Yes at Cell number on file:    Telephone Information:   Mobile [phone]

## 2025-07-22 RX ORDER — HYDROCODONE BITARTRATE AND ACETAMINOPHEN 5; 325 MG/1; MG/1
1 TABLET ORAL EVERY 6 HOURS PRN
Qty: 30 TABLET | Refills: 0 | Status: SHIPPED | OUTPATIENT
Start: 2025-07-22

## 2025-07-24 ENCOUNTER — LAB (OUTPATIENT)
Dept: LAB | Facility: CLINIC | Age: 80
End: 2025-07-24
Payer: COMMERCIAL

## 2025-07-24 ENCOUNTER — ANTICOAGULATION THERAPY VISIT (OUTPATIENT)
Dept: ANTICOAGULATION | Facility: CLINIC | Age: 80
End: 2025-07-24

## 2025-07-24 ENCOUNTER — RESULTS FOLLOW-UP (OUTPATIENT)
Dept: ANTICOAGULATION | Facility: CLINIC | Age: 80
End: 2025-07-24

## 2025-07-24 DIAGNOSIS — Z79.01 LONG TERM CURRENT USE OF ANTICOAGULANT THERAPY: ICD-10-CM

## 2025-07-24 DIAGNOSIS — D68.51 FACTOR V LEIDEN MUTATION: ICD-10-CM

## 2025-07-24 DIAGNOSIS — D68.9 COAGULATION DEFECT: Primary | ICD-10-CM

## 2025-07-24 LAB — INR BLD: 2.6 (ref 0.9–1.1)

## 2025-07-24 NOTE — PROGRESS NOTES
ANTICOAGULATION MANAGEMENT     Oh Solares 80 year old male is on warfarin with therapeutic INR result. (Goal INR 2.0-3.0)    Recent labs: (last 7 days)     07/24/25  0819   INR 2.6*       ASSESSMENT     Source(s): Chart Review and Patient/Caregiver Call     Warfarin doses taken: Warfarin taken as instructed  Diet: No new diet changes identified  Medication/supplement changes: None noted  New illness, injury, or hospitalization: No  Signs or symptoms of bleeding or clotting: No  Previous result: Subtherapeutic  Additional findings: None       PLAN     Recommended plan for no diet, medication or health factor changes affecting INR     Dosing Instructions: Continue your current warfarin dose with next INR in 3 weeks       Summary  As of 7/24/2025      Full warfarin instructions:  5 mg every day   Next INR check:  9/4/2025               Telephone call with Oh who verbalizes understanding and agrees to plan    Patient elected to schedule next visit in 6 weeks     Education provided: Contact [phone] with any changes, questions or concerns.     Plan made per [facility] anticoagulation protocol    Jaymie Lemon RN  7/24/2025  Anticoagulation Clinic  [employer] for routing messages: karthik MOY  [facility] patient phone line: [phone]        _______________________________________________________________________     Anticoagulation Episode Summary       Current INR goal:  2.0-3.0   TTR:  79.9% (1 y)   Target end date:  Indefinite   Send INR reminders to:  JESSICA MOY    Indications    Coagulation defect (HCC) [D68.9] [D68.9]  Factor V Leiden mutation [D68.51]  Long-term (current) use of anticoagulants [Z79.01] [Z79.01]             Comments:  --             Anticoagulation Care Providers       Provider Role Specialty Phone number    Brendon Kay MD Referring Internal Medicine - Pediatrics [phone]    Karina Galvez PA-C Referring Family Medicine [phone]

## 2025-07-29 DIAGNOSIS — F51.01 PRIMARY INSOMNIA: ICD-10-CM

## 2025-07-30 RX ORDER — ZOLPIDEM TARTRATE 10 MG/1
10 TABLET ORAL
Qty: 31 TABLET | Refills: 0 | Status: SHIPPED | OUTPATIENT
Start: 2025-07-30

## 2025-08-10 DIAGNOSIS — F41.9 ANXIETY: ICD-10-CM

## 2025-08-11 RX ORDER — ALPRAZOLAM 0.25 MG
0.25 TABLET ORAL 3 TIMES DAILY PRN
Qty: 30 TABLET | Refills: 0 | Status: SHIPPED | OUTPATIENT
Start: 2025-08-11

## 2025-08-25 DIAGNOSIS — F51.01 PRIMARY INSOMNIA: ICD-10-CM

## 2025-08-25 RX ORDER — ZOLPIDEM TARTRATE 10 MG/1
10 TABLET ORAL
Qty: 31 TABLET | Refills: 0 | Status: SHIPPED | OUTPATIENT
Start: 2025-08-25

## 2025-09-04 ENCOUNTER — ANTICOAGULATION THERAPY VISIT (OUTPATIENT)
Dept: ANTICOAGULATION | Facility: CLINIC | Age: 80
End: 2025-09-04

## 2025-09-04 ENCOUNTER — LAB (OUTPATIENT)
Dept: LAB | Facility: CLINIC | Age: 80
End: 2025-09-04
Payer: COMMERCIAL

## 2025-09-04 DIAGNOSIS — D68.51 FACTOR V LEIDEN MUTATION: ICD-10-CM

## 2025-09-04 DIAGNOSIS — Z79.01 LONG TERM CURRENT USE OF ANTICOAGULANT THERAPY: ICD-10-CM

## 2025-09-04 DIAGNOSIS — D68.9 COAGULATION DEFECT: Primary | ICD-10-CM

## 2025-09-04 LAB — INR BLD: 2 (ref 0.9–1.1)

## (undated) DEVICE — PREP CHLORAPREP 26ML TINTED ORANGE  260815

## (undated) DEVICE — SOL WATER IRRIG 1000ML BOTTLE 07139-09

## (undated) RX ORDER — FENTANYL CITRATE 50 UG/ML
INJECTION, SOLUTION INTRAMUSCULAR; INTRAVENOUS
Status: DISPENSED
Start: 2020-11-09